# Patient Record
Sex: FEMALE | Race: WHITE | NOT HISPANIC OR LATINO | URBAN - METROPOLITAN AREA
[De-identification: names, ages, dates, MRNs, and addresses within clinical notes are randomized per-mention and may not be internally consistent; named-entity substitution may affect disease eponyms.]

---

## 2018-11-06 ENCOUNTER — INPATIENT (INPATIENT)
Facility: HOSPITAL | Age: 78
LOS: 2 days | Discharge: NURSING, REHAB OR LONG-TERM CARE | DRG: 916 | End: 2018-11-09
Attending: INTERNAL MEDICINE | Admitting: HOSPITALIST
Payer: COMMERCIAL

## 2018-11-06 VITALS
SYSTOLIC BLOOD PRESSURE: 150 MMHG | TEMPERATURE: 98 F | HEART RATE: 65 BPM | RESPIRATION RATE: 20 BRPM | OXYGEN SATURATION: 97 % | DIASTOLIC BLOOD PRESSURE: 86 MMHG

## 2018-11-06 DIAGNOSIS — T78.3XXA ANGIONEUROTIC EDEMA, INITIAL ENCOUNTER: ICD-10-CM

## 2018-11-06 LAB
ALBUMIN SERPL ELPH-MCNC: 3.8 G/DL — SIGNIFICANT CHANGE UP (ref 3.3–5.2)
ALP SERPL-CCNC: 120 U/L — SIGNIFICANT CHANGE UP (ref 40–120)
ALT FLD-CCNC: 23 U/L — SIGNIFICANT CHANGE UP
ANION GAP SERPL CALC-SCNC: 13 MMOL/L — SIGNIFICANT CHANGE UP (ref 5–17)
AST SERPL-CCNC: 25 U/L — SIGNIFICANT CHANGE UP
BASOPHILS # BLD AUTO: 0 K/UL — SIGNIFICANT CHANGE UP (ref 0–0.2)
BASOPHILS NFR BLD AUTO: 0.3 % — SIGNIFICANT CHANGE UP (ref 0–2)
BILIRUB SERPL-MCNC: 0.5 MG/DL — SIGNIFICANT CHANGE UP (ref 0.4–2)
BUN SERPL-MCNC: 14 MG/DL — SIGNIFICANT CHANGE UP (ref 8–20)
CALCIUM SERPL-MCNC: 9.5 MG/DL — SIGNIFICANT CHANGE UP (ref 8.6–10.2)
CHLORIDE SERPL-SCNC: 105 MMOL/L — SIGNIFICANT CHANGE UP (ref 98–107)
CO2 SERPL-SCNC: 23 MMOL/L — SIGNIFICANT CHANGE UP (ref 22–29)
CREAT SERPL-MCNC: 0.71 MG/DL — SIGNIFICANT CHANGE UP (ref 0.5–1.3)
EOSINOPHIL # BLD AUTO: 0.1 K/UL — SIGNIFICANT CHANGE UP (ref 0–0.5)
EOSINOPHIL NFR BLD AUTO: 1.8 % — SIGNIFICANT CHANGE UP (ref 0–6)
GLUCOSE SERPL-MCNC: 94 MG/DL — SIGNIFICANT CHANGE UP (ref 70–115)
HCT VFR BLD CALC: 45.9 % — SIGNIFICANT CHANGE UP (ref 37–47)
HGB BLD-MCNC: 15.3 G/DL — SIGNIFICANT CHANGE UP (ref 12–16)
LYMPHOCYTES # BLD AUTO: 2.3 K/UL — SIGNIFICANT CHANGE UP (ref 1–4.8)
LYMPHOCYTES # BLD AUTO: 28.9 % — SIGNIFICANT CHANGE UP (ref 20–55)
MCHC RBC-ENTMCNC: 31.9 PG — HIGH (ref 27–31)
MCHC RBC-ENTMCNC: 33.3 G/DL — SIGNIFICANT CHANGE UP (ref 32–36)
MCV RBC AUTO: 95.6 FL — SIGNIFICANT CHANGE UP (ref 81–99)
MONOCYTES # BLD AUTO: 0.7 K/UL — SIGNIFICANT CHANGE UP (ref 0–0.8)
MONOCYTES NFR BLD AUTO: 8.5 % — SIGNIFICANT CHANGE UP (ref 3–10)
NEUTROPHILS # BLD AUTO: 4.7 K/UL — SIGNIFICANT CHANGE UP (ref 1.8–8)
NEUTROPHILS NFR BLD AUTO: 60.1 % — SIGNIFICANT CHANGE UP (ref 37–73)
PLATELET # BLD AUTO: 268 K/UL — SIGNIFICANT CHANGE UP (ref 150–400)
POTASSIUM SERPL-MCNC: 4.4 MMOL/L — SIGNIFICANT CHANGE UP (ref 3.5–5.3)
POTASSIUM SERPL-SCNC: 4.4 MMOL/L — SIGNIFICANT CHANGE UP (ref 3.5–5.3)
PROT SERPL-MCNC: 7.6 G/DL — SIGNIFICANT CHANGE UP (ref 6.6–8.7)
RBC # BLD: 4.8 M/UL — SIGNIFICANT CHANGE UP (ref 4.4–5.2)
RBC # FLD: 13.4 % — SIGNIFICANT CHANGE UP (ref 11–15.6)
SODIUM SERPL-SCNC: 141 MMOL/L — SIGNIFICANT CHANGE UP (ref 135–145)
WBC # BLD: 7.9 K/UL — SIGNIFICANT CHANGE UP (ref 4.8–10.8)
WBC # FLD AUTO: 7.9 K/UL — SIGNIFICANT CHANGE UP (ref 4.8–10.8)

## 2018-11-06 PROCEDURE — 99285 EMERGENCY DEPT VISIT HI MDM: CPT

## 2018-11-06 PROCEDURE — 71045 X-RAY EXAM CHEST 1 VIEW: CPT | Mod: 26

## 2018-11-06 PROCEDURE — 99223 1ST HOSP IP/OBS HIGH 75: CPT

## 2018-11-06 RX ORDER — ENOXAPARIN SODIUM 100 MG/ML
40 INJECTION SUBCUTANEOUS DAILY
Qty: 0 | Refills: 0 | Status: DISCONTINUED | OUTPATIENT
Start: 2018-11-06 | End: 2018-11-09

## 2018-11-06 RX ORDER — ATENOLOL 25 MG/1
25 TABLET ORAL DAILY
Qty: 0 | Refills: 0 | Status: DISCONTINUED | OUTPATIENT
Start: 2018-11-06 | End: 2018-11-09

## 2018-11-06 RX ORDER — MEMANTINE HYDROCHLORIDE 10 MG/1
5 TABLET ORAL EVERY 12 HOURS
Qty: 0 | Refills: 0 | Status: DISCONTINUED | OUTPATIENT
Start: 2018-11-06 | End: 2018-11-09

## 2018-11-06 RX ORDER — FAMOTIDINE 10 MG/ML
20 INJECTION INTRAVENOUS ONCE
Qty: 0 | Refills: 0 | Status: COMPLETED | OUTPATIENT
Start: 2018-11-06 | End: 2018-11-06

## 2018-11-06 RX ORDER — DIPHENHYDRAMINE HCL 50 MG
50 CAPSULE ORAL ONCE
Qty: 0 | Refills: 0 | Status: COMPLETED | OUTPATIENT
Start: 2018-11-06 | End: 2018-11-06

## 2018-11-06 RX ADMIN — Medication 50 MILLIGRAM(S): at 19:44

## 2018-11-06 RX ADMIN — Medication 125 MILLIGRAM(S): at 19:44

## 2018-11-06 RX ADMIN — FAMOTIDINE 20 MILLIGRAM(S): 10 INJECTION INTRAVENOUS at 19:44

## 2018-11-06 NOTE — H&P ADULT - FAMILY HISTORY
Family history of stroke, Father     Father  Still living? Unknown  Family history of abdominal aortic aneurysm, Age at diagnosis: Age Unknown

## 2018-11-06 NOTE — H&P ADULT - ASSESSMENT
79 y/o female PMHx of  dementia, HTN was sent from [facility] to the ED due to upper lip swelling that started at 3pm.    Upper lip swelling due to angioedema in a patient using Losartan   Benadryl, Steroid and Famotidine given in the ED   Will continue these medications as needed   Avoid ARBs and ACEI   Monitor O2 sat     HTN  Continue Atenolol 12.5mg daily  Monitor BP    Dementia  Continue Namenda 5mg BID  Continue Rivastigmine 1.5mg BID     CVA  Stable     Supportive   DVT prophylaxis: Lovenox 40mg   GI prophylaxis: not indicated   Diet: Will keep NPO over night and reassess in AM 79 y/o female PMHx of  dementia, HTN was sent from [facility] to the ED due to upper lip swelling that started at 3pm.    Upper lip swelling due to angioedema in a patient using Losartan   Benadryl, Steroid and Famotidine given in the ED   Will continue these medications as needed   Avoid ARBs and ACEI   Monitor O2 sat     HTN  Patient is on Atenolol 12.5mg daily, will increase it to 25mg   Losartan 100mg discontinued   Monitor BP    Dementia  Continue Namenda 5mg BID  Continue Rivastigmine 1.5mg BID     CVA  Stable     Supportive   DVT prophylaxis: Lovenox 40mg   GI prophylaxis: not indicated   Diet: Will keep NPO over night and reassess in AM

## 2018-11-06 NOTE — ED PROVIDER NOTE - OBJECTIVE STATEMENT
77yo female PMH dementia, HTN presenting with upper lip swelling that started at 3pm. Patient lives at nursing home, received Benadryl 25mg PO x 1 at 8am this morning. This afternoon, staff noticed swelling on upper lip and patient sent to ED. Patient does take a angiotensin receptor blocker (Losartan). As per EMS patient had no new exposures today- no new foods or medications.

## 2018-11-06 NOTE — ED PROVIDER NOTE - ATTENDING CONTRIBUTION TO CARE
79yo female PMH dementia, HTN presenting with upper lip swelling that started at 3pm. Patient lives at nursing home, received Benadryl 25mg PO x 1 at 8am this morning. This afternoon, staff noticed swelling on upper lip and patient sent to ED. Patient does take a angiotensin receptor blocker (Losartan). As per EMS patient had no new exposures today- no new foods or medications.  Gen: NAD  Head: NCAT  HEENT: Upper lip edematous, no uvular edema, no tongue swelling, normal voice  Lung: CTAB, no respiratory distress, no wheezing, rales, rhonchi  CV: normal s1/s2, rrr, no murmurs, Normal perfusion  Abd: soft, NTND  MSK: No edema, no visible deformities, full range of motion in all 4 extremities  Neuro: No focal neurologic deficits  Skin: No rash    persistent swelling - on Losartan given age little clinical response to meds will admit for airway os pulse ox bed

## 2018-11-06 NOTE — ED PROVIDER NOTE - MEDICAL DECISION MAKING DETAILS
79yo female with swelling to upper lip, likely angioedema, seems to be limited to upper lip without airway compromise. Likely reaction to Losartan. Will treat with prednisone, benadryl, pepcid, likely will need admission for airway monitoring. Brenda Branch DO

## 2018-11-06 NOTE — ED ADULT NURSE NOTE - INTERVENTIONS DEFINITIONS
Call bell, personal items and telephone within reach/[place] to call system/Instruct patient to call for assistance

## 2018-11-06 NOTE — ED PROVIDER NOTE - PHYSICAL EXAMINATION
Gen: NAD  Head: NCAT  HEENT: Upper lip edematous, no uvular edema, no tongue swelling, normal voice  Lung: CTAB, no respiratory distress, no wheezing, rales, rhonchi  CV: normal s1/s2, rrr, no murmurs, Normal perfusion  Abd: soft, NTND  MSK: No edema, no visible deformities, full range of motion in all 4 extremities  Neuro: No focal neurologic deficits  Skin: No rash

## 2018-11-06 NOTE — ED ADULT NURSE NOTE - OBJECTIVE STATEMENT
Pt awake, alert and oriented to person, place, disoriented to time, baseline mental status.  Pt send to ED from [facility] for upper lip swelling.  Pt denies difficulty breathing.  Respirations even and unlabored.  Denies pain.  No rash noted.  Pt denies any new food choices or exposures.  Lung sounds clear to auscultation.

## 2018-11-06 NOTE — ED ADULT TRIAGE NOTE - CHIEF COMPLAINT QUOTE
Pt THOMAS from [facility] in [place] for allergic reaction, swelling of lips, upper and lower airway cleared by MD Branch, Benadryl given at 0845 am. Pt at baseline mental status, hx of dementia

## 2018-11-06 NOTE — ED ADULT NURSE NOTE - CHIEF COMPLAINT QUOTE
Pt THOMAS from [facility] in [place] for allergic reaction, swelling of lips, upper and lower airway cleared by MD Branch, Benadryl given at 0845 am. Pt at baseline mental status, hx of dementia

## 2018-11-06 NOTE — H&P ADULT - HISTORY OF PRESENT ILLNESS
79 y/o female PMHx of  dementia, HTN was sent from [facility] to the ED due to upper lip swelling that started at 3pm. Patient received Benadryl 25mg PO x 1 at 8am this morning. This afternoon, staff noticed swelling on upper lip and patient sent to ED. Patient does take a angiotensin receptor blocker (Losartan). As per EMS patient had no new exposures today- no new foods or medications. Patient has no difficulty breathing, no chest pain, cough, HA, fever, no change in diet. As per patient's  at bed side, voice is normal.

## 2018-11-07 DIAGNOSIS — G30.9 ALZHEIMER'S DISEASE, UNSPECIFIED: ICD-10-CM

## 2018-11-07 DIAGNOSIS — I10 ESSENTIAL (PRIMARY) HYPERTENSION: ICD-10-CM

## 2018-11-07 DIAGNOSIS — I63.9 CEREBRAL INFARCTION, UNSPECIFIED: ICD-10-CM

## 2018-11-07 DIAGNOSIS — T78.3XXS ANGIONEUROTIC EDEMA, SEQUELA: ICD-10-CM

## 2018-11-07 PROCEDURE — 99233 SBSQ HOSP IP/OBS HIGH 50: CPT

## 2018-11-07 RX ORDER — ATORVASTATIN CALCIUM 80 MG/1
1 TABLET, FILM COATED ORAL
Qty: 0 | Refills: 0 | DISCHARGE
Start: 2018-11-07

## 2018-11-07 RX ORDER — ATORVASTATIN CALCIUM 80 MG/1
10 TABLET, FILM COATED ORAL AT BEDTIME
Qty: 0 | Refills: 0 | Status: DISCONTINUED | OUTPATIENT
Start: 2018-11-07 | End: 2018-11-09

## 2018-11-07 RX ORDER — LOSARTAN POTASSIUM 100 MG/1
1 TABLET, FILM COATED ORAL
Qty: 0 | Refills: 0 | COMMUNITY

## 2018-11-07 RX ORDER — ASPIRIN/CALCIUM CARB/MAGNESIUM 324 MG
81 TABLET ORAL DAILY
Qty: 0 | Refills: 0 | Status: DISCONTINUED | OUTPATIENT
Start: 2018-11-07 | End: 2018-11-09

## 2018-11-07 RX ORDER — ASPIRIN/CALCIUM CARB/MAGNESIUM 324 MG
1 TABLET ORAL
Qty: 0 | Refills: 0 | DISCHARGE
Start: 2018-11-07

## 2018-11-07 RX ADMIN — MEMANTINE HYDROCHLORIDE 5 MILLIGRAM(S): 10 TABLET ORAL at 19:03

## 2018-11-07 RX ADMIN — ENOXAPARIN SODIUM 40 MILLIGRAM(S): 100 INJECTION SUBCUTANEOUS at 19:03

## 2018-11-07 RX ADMIN — ATORVASTATIN CALCIUM 10 MILLIGRAM(S): 80 TABLET, FILM COATED ORAL at 21:23

## 2018-11-07 NOTE — SWALLOW BEDSIDE ASSESSMENT ADULT - SWALLOW EVAL: DIAGNOSIS
Oral & pharyngeal stage of swallow clinically unremarkable with no overt s/s of penetration/aspiration

## 2018-11-07 NOTE — DISCHARGE NOTE ADULT - HOSPITAL COURSE
78 year old female dementia, minimally swollen lip on arb.      Problem/Plan - 1:  ·  Problem: Angioedema, sequela.  Plan: Patient has minimally swollen lip on losartan. Although unlikely I would discontinue the losartan in lieu of other antihypertensive agents that are not prone towards angioedema.      Problem/Plan - 2:  ·  Problem: Cerebrovascular accident (CVA), unspecified mechanism.  Plan: Supportive care.      Problem/Plan - 3:  ·  Problem: Alzheimer's dementia with behavioral disturbance, unspecified timing of dementia onset.  Plan: Supportive care.      Problem/Plan - 4:  ·  Problem: Essential hypertension.  Plan: Monitor BP consider alternative agent if BP is elevated.     Attending Attestation:   I was physically present for the key portions of the evaluation and management (E/M) service provided.  I agree with the above history, physical, and plan which I have reviewed and edited where appropriate.     46 minutes spent on total encounter; more than 50% of the visit was spent counseling and/or coordinating care by the attending physician. 78 year old female dementia, minimally swollen lip on arb.      Problem/Plan - 1:  ·  Problem: Angioedema, sequela.  Plan: Patient has minimally swollen lip on losartan. Although unlikely I would discontinue the losartan in lieu of other antihypertensive agents that are not prone towards angioedema.      Problem/Plan - 2:  ·  Problem: Cerebrovascular accident (CVA), unspecified mechanism.  Plan: Supportive care.      Problem/Plan - 3:  ·  Problem: Alzheimer's dementia with behavioral disturbance, unspecified timing of dementia onset.  Plan: Supportive care.      Problem/Plan - 4:  ·  Problem: Essential hypertension.  Plan: Monitor BP consider alternative agent if BP is elevated.   Add amlodipine 5mg daily titrate up as necessary.    Attending Attestation:   I was physically present for the key portions of the evaluation and management (E/M) service provided.  I agree with the above history, physical, and plan which I have reviewed and edited where appropriate.     46 minutes spent on total encounter; more than 50% of the visit was spent counseling and/or coordinating care by the attending physician.

## 2018-11-07 NOTE — SWALLOW BEDSIDE ASSESSMENT ADULT - ASR SWALLOW ASPIRATION MONITOR
fever/pneumonia/throat clearing/upper respiratory infection/gurgly voice/change of breathing pattern/position upright (90Y)/cough/oral hygiene

## 2018-11-07 NOTE — SWALLOW BEDSIDE ASSESSMENT ADULT - COMMENTS
79 y/o female PMHx of  dementia, HTN was sent from [facility] to the ED due to upper lip swelling

## 2018-11-07 NOTE — DISCHARGE NOTE ADULT - CARE PLAN
Principal Discharge DX:	Angioedema, sequela  Goal:	Prevent Angioedema  Assessment and plan of treatment:	Discontinue losartan, Add additional anti hypertensive agent id necessary.  I doubt that this is true angioedema but patient does require close monitoring at this time.  Secondary Diagnosis:	Alzheimer's dementia with behavioral disturbance, unspecified timing of dementia onset  Goal:	Supportive care  Secondary Diagnosis:	Cerebrovascular accident (CVA), unspecified mechanism  Goal:	Prevent recurrent stroke  Assessment and plan of treatment:	Add aspirin and statin  Secondary Diagnosis:	Essential hypertension  Goal:	Monitor BP closely  Assessment and plan of treatment:	Now that losartan is discontinued she will probably need an alternative agent such as calcium channel blocker if necessary. Principal Discharge DX:	Angioedema, sequela  Goal:	Prevent Angioedema  Assessment and plan of treatment:	Discontinue losartan, Add additional anti hypertensive agent id necessary.  I doubt that this is true angioedema but patient does require close monitoring at this time.  Secondary Diagnosis:	Alzheimer's dementia with behavioral disturbance, unspecified timing of dementia onset  Goal:	Supportive care  Secondary Diagnosis:	Cerebrovascular accident (CVA), unspecified mechanism  Goal:	Prevent recurrent stroke  Assessment and plan of treatment:	Add aspirin and statin  Secondary Diagnosis:	Essential hypertension  Goal:	Monitor BP closely  Assessment and plan of treatment:	Now that losartan is discontinued she will probably need an alternative agent such as calcium channel blocker if necessary. Add amlodipine 5 mg daily

## 2018-11-07 NOTE — PROGRESS NOTE ADULT - SUBJECTIVE AND OBJECTIVE BOX
FLORIDALMA SKAGGS     Chief Complaint: Patient is a 78y old  Female who presents with a chief complaint of Angioedema (06 Nov 2018 21:09)      PAST MEDICAL & SURGICAL HISTORY:  CVA (cerebral vascular accident)  Dementia  Hypertension  S/P colonoscopy with polypectomy  S/P inguinal hernia repair: Bilateral  age 17      HPI/OVERNIGHT EVENTS: Patient lying in bed in no distress    MEDICATIONS  (STANDING):  ATENolol  Tablet 25 milliGRAM(s) Oral daily  enoxaparin Injectable 40 milliGRAM(s) SubCutaneous daily  memantine 5 milliGRAM(s) Oral every 12 hours      Vital Signs Last 24 Hrs  T(C): 36.4 (07 Nov 2018 11:03), Max: 37 (07 Nov 2018 04:04)  T(F): 97.5 (07 Nov 2018 11:03), Max: 98.6 (07 Nov 2018 04:04)  HR: 71 (07 Nov 2018 11:03) (65 - 71)  BP: 149/87 (07 Nov 2018 11:03) (128/78 - 150/86)  BP(mean): --  RR: 18 (07 Nov 2018 11:03) (16 - 20)  SpO2: 95% (07 Nov 2018 11:03) (94% - 97%)    PHYSICAL EXAM:  HEENT:  minimally swollen lips  Neck: No LAD, No JVD  Back: No CVA tenderness  Respiratory: CTAB Cardiovascular: S1 and S2, RRR, no M/G/R  Gastrointestinal: BS+, soft, NT/ND  Extremities: No peripheral edema  Vascular: 2+ peripheral pulses  Neurological: A/O x 0      CAPILLARY BLOOD GLUCOSE    LABS:                        15.3   7.9   )-----------( 268      ( 06 Nov 2018 19:35 )             45.9     11-06    141  |  105  |  14.0  ----------------------------<  94  4.4   |  23.0  |  0.71    Ca    9.5      06 Nov 2018 19:35    TPro  7.6  /  Alb  3.8  /  TBili  0.5  /  DBili  x   /  AST  25  /  ALT  23  /  AlkPhos  120  11-06          RADIOLOGY & ADDITIONAL TESTS:

## 2018-11-07 NOTE — DISCHARGE NOTE ADULT - MEDICATION SUMMARY - MEDICATIONS TO TAKE
I will START or STAY ON the medications listed below when I get home from the hospital:    aspirin 81 mg oral delayed release tablet  -- 1 tab(s) by mouth once a day  -- Indication: For Stroke    atorvastatin 10 mg oral tablet  -- 1 tab(s) by mouth once a day (at bedtime)  -- Indication: For Hyperlipidemia    atenolol  -- 12.5 milligram(s) by mouth once a day  -- Indication: For HTN    rivastigmine 1.5 mg oral capsule  -- 1 cap(s) by mouth 2 times a day  -- Indication: For Dementia    Namenda 5 mg oral tablet  -- 1 tab(s) by mouth 2 times a day  -- Indication: For Dementia    Vitamin D3 50,000 intl units oral capsule  -- orally once a week  -- Indication: For Vitamin D Deficiency I will START or STAY ON the medications listed below when I get home from the hospital:    aspirin 81 mg oral delayed release tablet  -- 1 tab(s) by mouth once a day  -- Indication: For Stroke    atorvastatin 10 mg oral tablet  -- 1 tab(s) by mouth once a day (at bedtime)  -- Indication: For Hyperlipidemia    atenolol  -- 12.5 milligram(s) by mouth once a day  -- Indication: For HTN    amLODIPine 2.5 mg oral tablet  -- 1 tab(s) by mouth once a day  -- Indication: For Hypertension    rivastigmine 1.5 mg oral capsule  -- 1 cap(s) by mouth 2 times a day  -- Indication: For Dementia    Namenda 5 mg oral tablet  -- 1 tab(s) by mouth 2 times a day  -- Indication: For Dementia    Vitamin D3 50,000 intl units oral capsule  -- orally once a week  -- Indication: For Vitamin D Deficiency

## 2018-11-07 NOTE — DISCHARGE NOTE ADULT - PATIENT PORTAL LINK FT
You can access the [facility] Patient Portal, offered by [facility], by registering with the following website: [email or web address]

## 2018-11-07 NOTE — PROGRESS NOTE ADULT - PROBLEM SELECTOR PLAN 1
Patient has minimally swollen lip on losartan. Although unlikely I would discontinue the losartan in lieu of other antihypertensive agents that are not prone towards angioedema

## 2018-11-07 NOTE — DISCHARGE NOTE ADULT - SECONDARY DIAGNOSIS.
Alzheimer's dementia with behavioral disturbance, unspecified timing of dementia onset Cerebrovascular accident (CVA), unspecified mechanism Essential hypertension

## 2018-11-07 NOTE — DISCHARGE NOTE ADULT - PLAN OF CARE
Prevent Angioedema Discontinue losartan, Add additional anti hypertensive agent id necessary.  I doubt that this is true angioedema but patient does require close monitoring at this time. Supportive care Prevent recurrent stroke Add aspirin and statin Monitor BP closely Now that losartan is discontinued she will probably need an alternative agent such as calcium channel blocker if necessary. Now that losartan is discontinued she will probably need an alternative agent such as calcium channel blocker if necessary. Add amlodipine 5 mg daily

## 2018-11-08 PROCEDURE — 99232 SBSQ HOSP IP/OBS MODERATE 35: CPT

## 2018-11-08 RX ORDER — AMLODIPINE BESYLATE 2.5 MG/1
1 TABLET ORAL
Qty: 30 | Refills: 0 | OUTPATIENT
Start: 2018-11-08

## 2018-11-08 RX ORDER — AMLODIPINE BESYLATE 2.5 MG/1
2.5 TABLET ORAL DAILY
Qty: 0 | Refills: 0 | Status: DISCONTINUED | OUTPATIENT
Start: 2018-11-08 | End: 2018-11-09

## 2018-11-08 RX ADMIN — AMLODIPINE BESYLATE 2.5 MILLIGRAM(S): 2.5 TABLET ORAL at 12:21

## 2018-11-08 RX ADMIN — Medication 81 MILLIGRAM(S): at 11:15

## 2018-11-08 RX ADMIN — ATORVASTATIN CALCIUM 10 MILLIGRAM(S): 80 TABLET, FILM COATED ORAL at 23:00

## 2018-11-08 RX ADMIN — MEMANTINE HYDROCHLORIDE 5 MILLIGRAM(S): 10 TABLET ORAL at 17:04

## 2018-11-08 RX ADMIN — ENOXAPARIN SODIUM 40 MILLIGRAM(S): 100 INJECTION SUBCUTANEOUS at 11:15

## 2018-11-08 RX ADMIN — MEMANTINE HYDROCHLORIDE 5 MILLIGRAM(S): 10 TABLET ORAL at 05:38

## 2018-11-08 RX ADMIN — ATENOLOL 25 MILLIGRAM(S): 25 TABLET ORAL at 05:38

## 2018-11-08 NOTE — PHYSICAL THERAPY INITIAL EVALUATION ADULT - PLANNED THERAPY INTERVENTIONS, PT EVAL
balance training/bed mobility training/gait training/postural re-education/strengthening/ROM/transfer training

## 2018-11-08 NOTE — PHYSICAL THERAPY INITIAL EVALUATION ADULT - ADDITIONAL COMMENTS
Pt is a poor historian, unknown PLOF, Per H&P pt resides at the [facility]. VISHAL Browne states pt had "some help" but unsure of level of assist needed.

## 2018-11-08 NOTE — CHART NOTE - NSCHARTNOTEFT_GEN_A_CORE
SWNote: [facility],awaiting call with review outcome. SW to follow.

## 2018-11-08 NOTE — PROGRESS NOTE ADULT - SUBJECTIVE AND OBJECTIVE BOX
FLORIDALMA SKAGGS     Chief Complaint: Patient is a 78y old  Female who presents with a chief complaint of Angioedema (07 Nov 2018 13:50)      PAST MEDICAL & SURGICAL HISTORY:  CVA (cerebral vascular accident)  Dementia  Hypertension  S/P colonoscopy with polypectomy  S/P inguinal hernia repair: Bilateral  age 17      HPI/OVERNIGHT EVENTS: Patient is awake and stable, He rBP is elevated, add amlodipine.    MEDICATIONS  (STANDING):  amLODIPine   Tablet 2.5 milliGRAM(s) Oral daily  aspirin enteric coated 81 milliGRAM(s) Oral daily  ATENolol  Tablet 25 milliGRAM(s) Oral daily  atorvastatin 10 milliGRAM(s) Oral at bedtime  enoxaparin Injectable 40 milliGRAM(s) SubCutaneous daily  memantine 5 milliGRAM(s) Oral every 12 hours      Vital Signs Last 24 Hrs  T(C): 36.7 (08 Nov 2018 07:15), Max: 36.8 (07 Nov 2018 16:10)  T(F): 98 (08 Nov 2018 07:15), Max: 98.3 (08 Nov 2018 05:37)  HR: 59 (08 Nov 2018 07:15) (59 - 86)  BP: 139/91 (08 Nov 2018 07:15) (139/91 - 163/78)  BP(mean): --  RR: 18 (08 Nov 2018 07:15) (17 - 18)  SpO2: 97% (08 Nov 2018 07:15) (94% - 97%)    PHYSICAL EXAM:  HEENT: PERRLA, EOMI, Normal Hearing  Neck: No LAD, No JVD  Back: No CVA tenderness  Respiratory: CTAB Cardiovascular: S1 and S2, RRR, no M/G/R  Gastrointestinal: BS+, soft, NT/ND  Extremities: No peripheral edema  Vascular: 2+ peripheral pulses  Neurological: A/O x 0 but she is verbal and answers simple questions.        CAPILLARY BLOOD GLUCOSE    LABS:                        15.3   7.9   )-----------( 268      ( 06 Nov 2018 19:35 )             45.9     11-06    141  |  105  |  14.0  ----------------------------<  94  4.4   |  23.0  |  0.71    Ca    9.5      06 Nov 2018 19:35    TPro  7.6  /  Alb  3.8  /  TBili  0.5  /  DBili  x   /  AST  25  /  ALT  23  /  AlkPhos  120  11-06          RADIOLOGY & ADDITIONAL TESTS:

## 2018-11-08 NOTE — PHYSICAL THERAPY INITIAL EVALUATION ADULT - PERTINENT HX OF CURRENT PROBLEM, REHAB EVAL
78 year old female with h/o Alzheimer's dementia, CVA,  Chief complaint of minimally swollen lip. Admitted for angioedema.

## 2018-11-08 NOTE — PROGRESS NOTE ADULT - ASSESSMENT
78 year old female dementia, minimally swollen lip on arb. Her losartan was discontinued secondary to fear of angioedema. Add amlodipine for elevated BP. She is stable to return to rehab.

## 2018-11-08 NOTE — PHYSICAL THERAPY INITIAL EVALUATION ADULT - TRANSFER SAFETY CONCERNS NOTED: SIT/STAND, REHAB EVAL
decreased balance during turns/decreased safety awareness/losing balance/decreased sequencing ability/decreased weight-shifting ability

## 2018-11-08 NOTE — PHYSICAL THERAPY INITIAL EVALUATION ADULT - IMPAIRED TRANSFERS: SIT/STAND, REHAB EVAL
decreased ROM/impaired balance/cognition/impaired coordination/narrow base of support/impaired motor control/abnormal muscle tone/impaired postural control/decreased strength

## 2018-11-08 NOTE — PHYSICAL THERAPY INITIAL EVALUATION ADULT - BALANCE DISTURBANCE, IDENTIFIED IMPAIRMENT CONTRIBUTE, REHAB EVAL
impaired coordination/impaired motor control/impaired postural control/abnormal muscle tone/decreased strength

## 2018-11-08 NOTE — PHYSICAL THERAPY INITIAL EVALUATION ADULT - IMPAIRMENTS FOUND, PT EVAL
muscle strength/cognitive impairment/gait, locomotion, and balance/arousal, attention, and cognition

## 2018-11-08 NOTE — PROGRESS NOTE ADULT - PROBLEM SELECTOR PLAN 1
Patient has minimally swollen lip on losartan. Although unlikely I would discontinue the losartan in lieu of other antihypertensive agents that are not prone towards angioedema. Add amlodipine 2.5mg daily.

## 2018-11-09 VITALS — DIASTOLIC BLOOD PRESSURE: 90 MMHG | TEMPERATURE: 97 F | SYSTOLIC BLOOD PRESSURE: 170 MMHG | HEART RATE: 66 BPM

## 2018-11-09 PROCEDURE — G0378: CPT

## 2018-11-09 PROCEDURE — 97110 THERAPEUTIC EXERCISES: CPT

## 2018-11-09 PROCEDURE — 96375 TX/PRO/DX INJ NEW DRUG ADDON: CPT

## 2018-11-09 PROCEDURE — 36415 COLL VENOUS BLD VENIPUNCTURE: CPT

## 2018-11-09 PROCEDURE — 85027 COMPLETE CBC AUTOMATED: CPT

## 2018-11-09 PROCEDURE — 80053 COMPREHEN METABOLIC PANEL: CPT

## 2018-11-09 PROCEDURE — 92610 EVALUATE SWALLOWING FUNCTION: CPT

## 2018-11-09 PROCEDURE — 96374 THER/PROPH/DIAG INJ IV PUSH: CPT

## 2018-11-09 PROCEDURE — 99285 EMERGENCY DEPT VISIT HI MDM: CPT | Mod: 25

## 2018-11-09 PROCEDURE — 97163 PT EVAL HIGH COMPLEX 45 MIN: CPT

## 2018-11-09 PROCEDURE — 97530 THERAPEUTIC ACTIVITIES: CPT

## 2018-11-09 PROCEDURE — 99239 HOSP IP/OBS DSCHRG MGMT >30: CPT

## 2018-11-09 PROCEDURE — 71045 X-RAY EXAM CHEST 1 VIEW: CPT

## 2018-11-09 RX ORDER — AMLODIPINE BESYLATE 2.5 MG/1
5 TABLET ORAL ONCE
Qty: 0 | Refills: 0 | Status: DISCONTINUED | OUTPATIENT
Start: 2018-11-09 | End: 2018-11-09

## 2018-11-09 RX ADMIN — ATENOLOL 25 MILLIGRAM(S): 25 TABLET ORAL at 06:18

## 2018-11-09 RX ADMIN — ENOXAPARIN SODIUM 40 MILLIGRAM(S): 100 INJECTION SUBCUTANEOUS at 12:08

## 2018-11-09 RX ADMIN — AMLODIPINE BESYLATE 2.5 MILLIGRAM(S): 2.5 TABLET ORAL at 06:18

## 2018-11-09 RX ADMIN — MEMANTINE HYDROCHLORIDE 5 MILLIGRAM(S): 10 TABLET ORAL at 06:18

## 2018-11-09 RX ADMIN — Medication 81 MILLIGRAM(S): at 12:08

## 2018-11-09 NOTE — PROGRESS NOTE ADULT - PROBLEM SELECTOR PLAN 1
Patient has minimally swollen lip on losartan. Although unlikely I would discontinue the losartan in lieu of other antihypertensive agents that are not prone towards angioedema. Add amlodipine 2.5mg daily. Patient has minimally swollen lip on losartan. Although unlikely I would discontinue the losartan in lieu of other antihypertensive agents that are not prone towards angioedema. Add amlodipine 2.5mg daily. 11/9 increase amlodipine to 5 mg daily secondary to elevated bp.

## 2018-11-09 NOTE — PROGRESS NOTE ADULT - SUBJECTIVE AND OBJECTIVE BOX
FLORIDALMA SKAGGS     Chief Complaint: Patient is a 78y old  Female who presents with a chief complaint of Angioedema (08 Nov 2018 09:42)      PAST MEDICAL & SURGICAL HISTORY:  CVA (cerebral vascular accident)  Dementia  Hypertension  S/P colonoscopy with polypectomy  S/P inguinal hernia repair: Bilateral  age 17      HPI/OVERNIGHT EVENTS: Patient lying in bed in no distress. She is more alert asking to talk to her .    MEDICATIONS  (STANDING):  amLODIPine   Tablet 2.5 milliGRAM(s) Oral daily  aspirin enteric coated 81 milliGRAM(s) Oral daily  ATENolol  Tablet 25 milliGRAM(s) Oral daily  atorvastatin 10 milliGRAM(s) Oral at bedtime  enoxaparin Injectable 40 milliGRAM(s) SubCutaneous daily  memantine 5 milliGRAM(s) Oral every 12 hours      Vital Signs Last 24 Hrs  T(C): 36.4 (09 Nov 2018 11:36), Max: 36.7 (08 Nov 2018 23:40)  T(F): 97.6 (09 Nov 2018 11:36), Max: 98 (08 Nov 2018 23:40)  HR: 62 (09 Nov 2018 11:36) (54 - 70)  BP: 156/85 (09 Nov 2018 11:36) (143/87 - 157/93)  BP(mean): --  RR: 18 (09 Nov 2018 11:36) (17 - 18)  SpO2: 95% (09 Nov 2018 11:36) (93% - 95%)    PHYSICAL EXAM:  HEENT: PERRLA, EOMI, Normal Hearing  Neck: No LAD, No JVD  Back: No CVA tenderness  Respiratory: CTAB Cardiovascular: S1 and S2, RRR, no M/G/R  Gastrointestinal: BS+, soft, NT/ND  Extremities: No peripheral edema  Vascular: 2+ peripheral pulses  Neurological: A/O x  2        CAPILLARY BLOOD GLUCOSE    LABS:                RADIOLOGY & ADDITIONAL TESTS:

## 2019-05-07 ENCOUNTER — EMERGENCY (EMERGENCY)
Facility: HOSPITAL | Age: 79
LOS: 1 days | End: 2019-05-07
Attending: EMERGENCY MEDICINE
Payer: COMMERCIAL

## 2019-05-07 VITALS
SYSTOLIC BLOOD PRESSURE: 174 MMHG | HEIGHT: 66 IN | OXYGEN SATURATION: 98 % | RESPIRATION RATE: 16 BRPM | TEMPERATURE: 98 F | DIASTOLIC BLOOD PRESSURE: 96 MMHG | WEIGHT: 169.98 LBS | HEART RATE: 60 BPM

## 2019-05-07 VITALS
DIASTOLIC BLOOD PRESSURE: 83 MMHG | TEMPERATURE: 98 F | HEART RATE: 57 BPM | OXYGEN SATURATION: 97 % | RESPIRATION RATE: 16 BRPM | SYSTOLIC BLOOD PRESSURE: 128 MMHG

## 2019-05-07 PROCEDURE — 72170 X-RAY EXAM OF PELVIS: CPT | Mod: 26

## 2019-05-07 PROCEDURE — 71045 X-RAY EXAM CHEST 1 VIEW: CPT | Mod: 26

## 2019-05-07 PROCEDURE — 70450 CT HEAD/BRAIN W/O DYE: CPT | Mod: 26

## 2019-05-07 PROCEDURE — 70450 CT HEAD/BRAIN W/O DYE: CPT

## 2019-05-07 PROCEDURE — 71045 X-RAY EXAM CHEST 1 VIEW: CPT

## 2019-05-07 PROCEDURE — 99284 EMERGENCY DEPT VISIT MOD MDM: CPT | Mod: 25

## 2019-05-07 PROCEDURE — 72170 X-RAY EXAM OF PELVIS: CPT

## 2019-05-07 NOTE — ED PROVIDER NOTE - CLINICAL SUMMARY MEDICAL DECISION MAKING FREE TEXT BOX
pt with likely mechanical fall going to bathroom without help, ct head, chest/pelvis xray as part of fall with poor historian work up. cpk. reassess pt with likely mechanical fall going to bathroom without help, ct head, chest/pelvis xray as part of fall with poor historian work up. reassess

## 2019-05-07 NOTE — ED ADULT NURSE NOTE - CHIEF COMPLAINT QUOTE
unwitnessed fall into bathtub at [facility]. Hx of dementia, at baseline as per EMS. unknown time of fall. small hematoma noted to back of head, negative blood thinners. no other significant trauma noted. Priority CT called from ambulance

## 2019-05-07 NOTE — ED PROVIDER NOTE - OBJECTIVE STATEMENT
78 y/o female hx dementia, htn, cva, present from assisted living s/p fall into bathtub. Fall was not witnessed per ems, pt found awake in bathtub. Pt poor historian but is at baseline per home via ems. Pt without complaints, unclear exactly how long pt down but facilty checks on pts every few hours. Pt dnr.     limited ros by dementia 78 y/o female hx dementia, htn, cva, present from assisted living s/p fall into bathtub. Fall was not witnessed per ems, pt found awake in bathtub. Pt poor historian but is at baseline per home via ems. Pt without complaints, unclear exactly how long pt down but facilty checks on pts every few hours. Pt dnr. no a/c.     limited ros by dementia

## 2019-05-07 NOTE — ED ADULT TRIAGE NOTE - CHIEF COMPLAINT QUOTE
unwitnessed fall into bathtub at [facility]. Hx of dementia, at baseline as per EMS. unknown time of fall. small hematoma noted to back of head, negative blood thinners. no other significant trauma noted. Priority CT called from ambulance

## 2019-05-07 NOTE — ED ADULT NURSE NOTE - OBJECTIVE STATEMENT
Assumed care of patient at 0230, alert awake oriented to person. At baseline as per EMS. Hx of dementia. s/p Unwitnessed fall at [facility]. Small hematoma noted to back of head. No other injuries noted. Pt denies pain. scans completed and resulted. Pt to be discharged back to nursing home. Awaiting ambulance.

## 2019-05-07 NOTE — ED PROVIDER NOTE - PHYSICAL EXAMINATION
Gen: No acute distress, non toxic  HEENT: Mucous membranes moist, pink conjunctivae, EOMI. small hematoma occiput, no lac/abrasion  Neck: no midline ttp.   CV: RRR, nl s1/s2.  Resp: CTAB, normal rate and effort  GI: Abdomen soft, NT, ND. No rebound, no guarding  : No CVAT  Neuro: A&O x 1, moves all extremites.   MSK: No spine or joint tenderness to palpation. full rom b/l LE without shortening or rotation. no deformity or bruising.  Skin: No rashes. intact and perfused.

## 2019-05-07 NOTE — ED PROVIDER NOTE - PROGRESS NOTE DETAILS
Ana: neg ct and xray without acute fracuture, no other injury spoke to Philly from [place], aware pt cleared for return.

## 2019-05-07 NOTE — ED ADULT NURSE NOTE - NSIMPLEMENTINTERV_GEN_ALL_ED
Implemented All Fall Risk Interventions:  [place] to call system. Call bell, personal items and telephone within reach. Instruct patient to call for assistance. Room bathroom lighting operational. Non-slip footwear when patient is off stretcher. Physically safe environment: no spills, clutter or unnecessary equipment. Stretcher in lowest position, wheels locked, appropriate side rails in place. Provide visual cue, wrist band, yellow gown, etc. Monitor gait and stability. Monitor for mental status changes and reorient to person, place, and time. Review medications for side effects contributing to fall risk. Reinforce activity limits and safety measures with patient and family.

## 2019-05-13 ENCOUNTER — EMERGENCY (EMERGENCY)
Facility: HOSPITAL | Age: 79
LOS: 1 days | Discharge: HOME | End: 2019-05-13
Attending: EMERGENCY MEDICINE
Payer: COMMERCIAL

## 2019-05-13 VITALS
SYSTOLIC BLOOD PRESSURE: 175 MMHG | DIASTOLIC BLOOD PRESSURE: 101 MMHG | TEMPERATURE: 98 F | RESPIRATION RATE: 18 BRPM | HEIGHT: 62 IN | OXYGEN SATURATION: 98 % | WEIGHT: 169.98 LBS | HEART RATE: 99 BPM

## 2019-05-13 VITALS
DIASTOLIC BLOOD PRESSURE: 96 MMHG | HEART RATE: 85 BPM | TEMPERATURE: 99 F | OXYGEN SATURATION: 97 % | SYSTOLIC BLOOD PRESSURE: 183 MMHG | RESPIRATION RATE: 18 BRPM

## 2019-05-13 LAB
ALBUMIN SERPL ELPH-MCNC: 4.3 G/DL — SIGNIFICANT CHANGE UP (ref 3.3–5.2)
ALP SERPL-CCNC: 177 U/L — HIGH (ref 40–120)
ALT FLD-CCNC: 30 U/L — SIGNIFICANT CHANGE UP
ANION GAP SERPL CALC-SCNC: 18 MMOL/L — HIGH (ref 5–17)
APPEARANCE UR: CLEAR — SIGNIFICANT CHANGE UP
AST SERPL-CCNC: 35 U/L — HIGH
BACTERIA # UR AUTO: ABNORMAL
BASOPHILS # BLD AUTO: 0 K/UL — SIGNIFICANT CHANGE UP (ref 0–0.2)
BASOPHILS NFR BLD AUTO: 0.3 % — SIGNIFICANT CHANGE UP (ref 0–2)
BILIRUB SERPL-MCNC: 0.5 MG/DL — SIGNIFICANT CHANGE UP (ref 0.4–2)
BILIRUB UR-MCNC: NEGATIVE — SIGNIFICANT CHANGE UP
BUN SERPL-MCNC: 16 MG/DL — SIGNIFICANT CHANGE UP (ref 8–20)
CALCIUM SERPL-MCNC: 9.9 MG/DL — SIGNIFICANT CHANGE UP (ref 8.6–10.2)
CHLORIDE SERPL-SCNC: 103 MMOL/L — SIGNIFICANT CHANGE UP (ref 98–107)
CO2 SERPL-SCNC: 23 MMOL/L — SIGNIFICANT CHANGE UP (ref 22–29)
COLOR SPEC: YELLOW — SIGNIFICANT CHANGE UP
CREAT SERPL-MCNC: 0.67 MG/DL — SIGNIFICANT CHANGE UP (ref 0.5–1.3)
DIFF PNL FLD: ABNORMAL
EOSINOPHIL # BLD AUTO: 0.1 K/UL — SIGNIFICANT CHANGE UP (ref 0–0.5)
EOSINOPHIL NFR BLD AUTO: 0.6 % — SIGNIFICANT CHANGE UP (ref 0–6)
EPI CELLS # UR: SIGNIFICANT CHANGE UP
GLUCOSE SERPL-MCNC: 118 MG/DL — HIGH (ref 70–115)
GLUCOSE UR QL: NEGATIVE MG/DL — SIGNIFICANT CHANGE UP
HCT VFR BLD CALC: 48.4 % — HIGH (ref 37–47)
HGB BLD-MCNC: 15.9 G/DL — SIGNIFICANT CHANGE UP (ref 12–16)
KETONES UR-MCNC: NEGATIVE — SIGNIFICANT CHANGE UP
LEUKOCYTE ESTERASE UR-ACNC: NEGATIVE — SIGNIFICANT CHANGE UP
LYMPHOCYTES # BLD AUTO: 2.8 K/UL — SIGNIFICANT CHANGE UP (ref 1–4.8)
LYMPHOCYTES # BLD AUTO: 22 % — SIGNIFICANT CHANGE UP (ref 20–55)
MAGNESIUM SERPL-MCNC: 2 MG/DL — SIGNIFICANT CHANGE UP (ref 1.6–2.6)
MCHC RBC-ENTMCNC: 30.5 PG — SIGNIFICANT CHANGE UP (ref 27–31)
MCHC RBC-ENTMCNC: 32.9 G/DL — SIGNIFICANT CHANGE UP (ref 32–36)
MCV RBC AUTO: 92.7 FL — SIGNIFICANT CHANGE UP (ref 81–99)
MONOCYTES # BLD AUTO: 0.9 K/UL — HIGH (ref 0–0.8)
MONOCYTES NFR BLD AUTO: 7.1 % — SIGNIFICANT CHANGE UP (ref 3–10)
NEUTROPHILS # BLD AUTO: 8.9 K/UL — HIGH (ref 1.8–8)
NEUTROPHILS NFR BLD AUTO: 69.6 % — SIGNIFICANT CHANGE UP (ref 37–73)
NITRITE UR-MCNC: POSITIVE
PH UR: 7 — SIGNIFICANT CHANGE UP (ref 5–8)
PLATELET # BLD AUTO: 251 K/UL — SIGNIFICANT CHANGE UP (ref 150–400)
POTASSIUM SERPL-MCNC: 4.7 MMOL/L — SIGNIFICANT CHANGE UP (ref 3.5–5.3)
POTASSIUM SERPL-SCNC: 4.7 MMOL/L — SIGNIFICANT CHANGE UP (ref 3.5–5.3)
PROT SERPL-MCNC: 8.5 G/DL — SIGNIFICANT CHANGE UP (ref 6.6–8.7)
PROT UR-MCNC: 30 MG/DL
RBC # BLD: 5.22 M/UL — HIGH (ref 4.4–5.2)
RBC # FLD: 13.7 % — SIGNIFICANT CHANGE UP (ref 11–15.6)
RBC CASTS # UR COMP ASSIST: ABNORMAL /HPF (ref 0–4)
SODIUM SERPL-SCNC: 144 MMOL/L — SIGNIFICANT CHANGE UP (ref 135–145)
SP GR SPEC: 1.01 — SIGNIFICANT CHANGE UP (ref 1.01–1.02)
T4 AB SER-ACNC: 7.2 UG/DL — SIGNIFICANT CHANGE UP (ref 4.5–12)
TSH SERPL-MCNC: 6.97 UIU/ML — HIGH (ref 0.27–4.2)
UROBILINOGEN FLD QL: NEGATIVE MG/DL — SIGNIFICANT CHANGE UP
WBC # BLD: 12.8 K/UL — HIGH (ref 4.8–10.8)
WBC # FLD AUTO: 12.8 K/UL — HIGH (ref 4.8–10.8)
WBC UR QL: SIGNIFICANT CHANGE UP

## 2019-05-13 PROCEDURE — 70450 CT HEAD/BRAIN W/O DYE: CPT | Mod: 26

## 2019-05-13 PROCEDURE — 99284 EMERGENCY DEPT VISIT MOD MDM: CPT

## 2019-05-13 RX ORDER — CEPHALEXIN 500 MG
500 CAPSULE ORAL ONCE
Refills: 0 | Status: COMPLETED | OUTPATIENT
Start: 2019-05-13 | End: 2019-05-13

## 2019-05-13 RX ORDER — CEPHALEXIN 500 MG
1 CAPSULE ORAL
Qty: 20 | Refills: 0
Start: 2019-05-13 | End: 2019-05-17

## 2019-05-13 RX ORDER — SODIUM CHLORIDE 9 MG/ML
1000 INJECTION INTRAMUSCULAR; INTRAVENOUS; SUBCUTANEOUS ONCE
Refills: 0 | Status: COMPLETED | OUTPATIENT
Start: 2019-05-13 | End: 2019-05-13

## 2019-05-13 RX ADMIN — Medication 500 MILLIGRAM(S): at 22:36

## 2019-05-13 RX ADMIN — SODIUM CHLORIDE 1000 MILLILITER(S): 9 INJECTION INTRAMUSCULAR; INTRAVENOUS; SUBCUTANEOUS at 21:00

## 2019-05-13 NOTE — ED ADULT NURSE NOTE - NSIMPLEMENTINTERV_GEN_ALL_ED
Implemented All Fall with Harm Risk Interventions:  [place] to call system. Call bell, personal items and telephone within reach. Instruct patient to call for assistance. Room bathroom lighting operational. Non-slip footwear when patient is off stretcher. Physically safe environment: no spills, clutter or unnecessary equipment. Stretcher in lowest position, wheels locked, appropriate side rails in place. Provide visual cue, wrist band, yellow gown, etc. Monitor gait and stability. Monitor for mental status changes and reorient to person, place, and time. Review medications for side effects contributing to fall risk. Reinforce activity limits and safety measures with patient and family. Provide visual clues: red socks.

## 2019-05-13 NOTE — ED ADULT NURSE REASSESSMENT NOTE - NS ED NURSE REASSESS COMMENT FT1
Discharge instructions reviewed with EMS and  who verbalized understanding. Report given ANAND Stratton  at [place]. BP elevated, Dr Marques made aware. Ok fo discharge as per MD Marques, no inter Discharge instructions reviewed with EMS and  who verbalized understanding. Report given ANAND Stratton  at [place]. BP elevated, Dr Marques made aware. Ok fo discharge as per MD Marques, no interventions needed, pt to go back to facility per MD. RN Made aware. Discharge instructions reviewed with EMS and  who verbalized understanding. Report given ANAND Stratton  at [facility]. BP elevated, Dr Marques made aware. Pt is asymptomatic at this time. Ok fo discharge as per MD Marques, no interventions needed for BP pt to go back to facility per MD. RN at [facility] made aware.

## 2019-05-13 NOTE — ED PROVIDER NOTE - OBJECTIVE STATEMENT
79 year old female with PMH CVA, dementia, HTN presents with AMS. As per staff at SNF, the pt was acting aggressively today, screamign in the dining area. No trauma. Pt is calm and cooperative upon eval and states that she does not know why she is here an deneis any complaints at this time. As per family at bedside, the pt often gets this way she gets a uti. Deneis chest pain, fever, chills, headache.

## 2019-05-13 NOTE — ED PROVIDER NOTE - CONSTITUTIONAL, MLM
normal... Well appearing, well nourished, awake, alert, oriented to person,only and in no apparent distress.

## 2019-05-13 NOTE — ED ADULT NURSE NOTE - OBJECTIVE STATEMENT
79 year old female a&ox3, confused to date. pt. comes to ED sent from [facility] for increased confusion and agitation. as per  pt. has hx of dementia. pt. has hx of a stroke a couple of years a go, pt. ambulates with a walker. pt. denies pain or discomfort at this time. pt. in no apparent distress at this time, breathing even and unlabored.

## 2019-05-13 NOTE — ED ADULT NURSE REASSESSMENT NOTE - NS ED NURSE REASSESS COMMENT FT1
Assumed care of patient from ongoing RN, charting as noted, alert and oriented to person and place, confused to time. Hx of dementia. Resting comfortably in stretcher. VSS. Straight cath done per MD order using steryl technique. 300ML clear yellow urine noted to bag. Urine sent to lab.  at bedside. Pt calm and cooperative. Comfort measures provided. Awaiting results, safety maintained.

## 2019-05-13 NOTE — ED PROVIDER NOTE - CLINICAL SUMMARY MEDICAL DECISION MAKING FREE TEXT BOX
no agiation or ams in ed + uti spok ewith rn [facility] return to ed for untrolled fever change in mental status or any overall worsening

## 2019-05-13 NOTE — ED ADULT TRIAGE NOTE - CHIEF COMPLAINT QUOTE
Pt sent in from facility for increase in AMS. Pt normally with dementia but staff stated that she is more confused than normal. Pt is pleasant in triage Pt sent in from facility for increase in AMS. Pt normally with dementia but staff stated that she is more confused than normal. Was yelling about "eating penises" during lunch.  Pt is pleasant in triage Pt sent in from facility for increase in AMS. Pt normally with dementia but staff stated that she is more confused than normal. Was yelling about "eating penis" during lunch.  Pt is pleasant in triage

## 2019-05-13 NOTE — ED ADULT NURSE NOTE - CHIEF COMPLAINT QUOTE
Pt sent in from facility for increase in AMS. Pt normally with dementia but staff stated that she is more confused than normal. Was yelling about "eating penis" during lunch.  Pt is pleasant in triage

## 2019-05-16 ENCOUNTER — INPATIENT (INPATIENT)
Facility: HOSPITAL | Age: 79
LOS: 1 days | Discharge: HOME | DRG: 690 | End: 2019-05-18
Attending: INTERNAL MEDICINE | Admitting: HOSPITALIST
Payer: COMMERCIAL

## 2019-05-16 VITALS
SYSTOLIC BLOOD PRESSURE: 147 MMHG | HEIGHT: 66 IN | RESPIRATION RATE: 18 BRPM | WEIGHT: 169.98 LBS | TEMPERATURE: 98 F | DIASTOLIC BLOOD PRESSURE: 87 MMHG | HEART RATE: 59 BPM | OXYGEN SATURATION: 95 %

## 2019-05-16 DIAGNOSIS — A49.9 BACTERIAL INFECTION, UNSPECIFIED: ICD-10-CM

## 2019-05-16 LAB
-  AMIKACIN: SIGNIFICANT CHANGE UP
-  AMPICILLIN/SULBACTAM: SIGNIFICANT CHANGE UP
-  AMPICILLIN: SIGNIFICANT CHANGE UP
-  AZTREONAM: SIGNIFICANT CHANGE UP
-  CEFAZOLIN: SIGNIFICANT CHANGE UP
-  CEFEPIME: SIGNIFICANT CHANGE UP
-  CEFOXITIN: SIGNIFICANT CHANGE UP
-  CEFTRIAXONE: SIGNIFICANT CHANGE UP
-  CIPROFLOXACIN: SIGNIFICANT CHANGE UP
-  ERTAPENEM: SIGNIFICANT CHANGE UP
-  GENTAMICIN: SIGNIFICANT CHANGE UP
-  IMIPENEM: SIGNIFICANT CHANGE UP
-  LEVOFLOXACIN: SIGNIFICANT CHANGE UP
-  MEROPENEM: SIGNIFICANT CHANGE UP
-  NITROFURANTOIN: SIGNIFICANT CHANGE UP
-  PIPERACILLIN/TAZOBACTAM: SIGNIFICANT CHANGE UP
-  TIGECYCLINE: SIGNIFICANT CHANGE UP
-  TOBRAMYCIN: SIGNIFICANT CHANGE UP
-  TRIMETHOPRIM/SULFAMETHOXAZOLE: SIGNIFICANT CHANGE UP
ALBUMIN SERPL ELPH-MCNC: 3.9 G/DL — SIGNIFICANT CHANGE UP (ref 3.3–5.2)
ALP SERPL-CCNC: 145 U/L — HIGH (ref 40–120)
ALT FLD-CCNC: 25 U/L — SIGNIFICANT CHANGE UP
ANION GAP SERPL CALC-SCNC: 14 MMOL/L — SIGNIFICANT CHANGE UP (ref 5–17)
APPEARANCE UR: CLEAR — SIGNIFICANT CHANGE UP
APTT BLD: 30.9 SEC — SIGNIFICANT CHANGE UP (ref 27.5–36.3)
AST SERPL-CCNC: 29 U/L — SIGNIFICANT CHANGE UP
BACTERIA # UR AUTO: NEGATIVE — SIGNIFICANT CHANGE UP
BASOPHILS # BLD AUTO: 0 K/UL — SIGNIFICANT CHANGE UP (ref 0–0.2)
BASOPHILS NFR BLD AUTO: 0.3 % — SIGNIFICANT CHANGE UP (ref 0–2)
BILIRUB SERPL-MCNC: 0.7 MG/DL — SIGNIFICANT CHANGE UP (ref 0.4–2)
BILIRUB UR-MCNC: NEGATIVE — SIGNIFICANT CHANGE UP
BUN SERPL-MCNC: 20 MG/DL — SIGNIFICANT CHANGE UP (ref 8–20)
CALCIUM SERPL-MCNC: 9.5 MG/DL — SIGNIFICANT CHANGE UP (ref 8.6–10.2)
CHLORIDE SERPL-SCNC: 107 MMOL/L — SIGNIFICANT CHANGE UP (ref 98–107)
CO2 SERPL-SCNC: 24 MMOL/L — SIGNIFICANT CHANGE UP (ref 22–29)
COLOR SPEC: YELLOW — SIGNIFICANT CHANGE UP
CREAT SERPL-MCNC: 0.79 MG/DL — SIGNIFICANT CHANGE UP (ref 0.5–1.3)
CULTURE RESULTS: SIGNIFICANT CHANGE UP
DIFF PNL FLD: ABNORMAL
EOSINOPHIL # BLD AUTO: 0.2 K/UL — SIGNIFICANT CHANGE UP (ref 0–0.5)
EOSINOPHIL NFR BLD AUTO: 2.1 % — SIGNIFICANT CHANGE UP (ref 0–6)
EPI CELLS # UR: SIGNIFICANT CHANGE UP
GLUCOSE SERPL-MCNC: 93 MG/DL — SIGNIFICANT CHANGE UP (ref 70–115)
GLUCOSE UR QL: NEGATIVE MG/DL — SIGNIFICANT CHANGE UP
HCT VFR BLD CALC: 47.4 % — HIGH (ref 37–47)
HGB BLD-MCNC: 15.3 G/DL — SIGNIFICANT CHANGE UP (ref 12–16)
INR BLD: 1.01 RATIO — SIGNIFICANT CHANGE UP (ref 0.88–1.16)
KETONES UR-MCNC: NEGATIVE — SIGNIFICANT CHANGE UP
LACTATE BLDV-MCNC: 1 MMOL/L — SIGNIFICANT CHANGE UP (ref 0.5–2)
LACTATE BLDV-MCNC: 2.2 MMOL/L — HIGH (ref 0.5–2)
LEUKOCYTE ESTERASE UR-ACNC: ABNORMAL
LYMPHOCYTES # BLD AUTO: 2.6 K/UL — SIGNIFICANT CHANGE UP (ref 1–4.8)
LYMPHOCYTES # BLD AUTO: 29.6 % — SIGNIFICANT CHANGE UP (ref 20–55)
MCHC RBC-ENTMCNC: 30.9 PG — SIGNIFICANT CHANGE UP (ref 27–31)
MCHC RBC-ENTMCNC: 32.3 G/DL — SIGNIFICANT CHANGE UP (ref 32–36)
MCV RBC AUTO: 95.8 FL — SIGNIFICANT CHANGE UP (ref 81–99)
METHOD TYPE: SIGNIFICANT CHANGE UP
MONOCYTES # BLD AUTO: 0.7 K/UL — SIGNIFICANT CHANGE UP (ref 0–0.8)
MONOCYTES NFR BLD AUTO: 7.5 % — SIGNIFICANT CHANGE UP (ref 3–10)
NEUTROPHILS # BLD AUTO: 5.3 K/UL — SIGNIFICANT CHANGE UP (ref 1.8–8)
NEUTROPHILS NFR BLD AUTO: 60.3 % — SIGNIFICANT CHANGE UP (ref 37–73)
NITRITE UR-MCNC: NEGATIVE — SIGNIFICANT CHANGE UP
ORGANISM # SPEC MICROSCOPIC CNT: SIGNIFICANT CHANGE UP
ORGANISM # SPEC MICROSCOPIC CNT: SIGNIFICANT CHANGE UP
PH UR: 6 — SIGNIFICANT CHANGE UP (ref 5–8)
PLATELET # BLD AUTO: 269 K/UL — SIGNIFICANT CHANGE UP (ref 150–400)
POTASSIUM SERPL-MCNC: 4.3 MMOL/L — SIGNIFICANT CHANGE UP (ref 3.5–5.3)
POTASSIUM SERPL-SCNC: 4.3 MMOL/L — SIGNIFICANT CHANGE UP (ref 3.5–5.3)
PROT SERPL-MCNC: 7.5 G/DL — SIGNIFICANT CHANGE UP (ref 6.6–8.7)
PROT UR-MCNC: 15 MG/DL
PROTHROM AB SERPL-ACNC: 11.6 SEC — SIGNIFICANT CHANGE UP (ref 10–12.9)
RBC # BLD: 4.95 M/UL — SIGNIFICANT CHANGE UP (ref 4.4–5.2)
RBC # FLD: 13.6 % — SIGNIFICANT CHANGE UP (ref 11–15.6)
RBC CASTS # UR COMP ASSIST: NEGATIVE /HPF — SIGNIFICANT CHANGE UP (ref 0–4)
SODIUM SERPL-SCNC: 145 MMOL/L — SIGNIFICANT CHANGE UP (ref 135–145)
SP GR SPEC: 1.02 — SIGNIFICANT CHANGE UP (ref 1.01–1.02)
SPECIMEN SOURCE: SIGNIFICANT CHANGE UP
UROBILINOGEN FLD QL: NEGATIVE MG/DL — SIGNIFICANT CHANGE UP
WBC # BLD: 8.8 K/UL — SIGNIFICANT CHANGE UP (ref 4.8–10.8)
WBC # FLD AUTO: 8.8 K/UL — SIGNIFICANT CHANGE UP (ref 4.8–10.8)
WBC UR QL: SIGNIFICANT CHANGE UP

## 2019-05-16 PROCEDURE — 93010 ELECTROCARDIOGRAM REPORT: CPT

## 2019-05-16 PROCEDURE — 71045 X-RAY EXAM CHEST 1 VIEW: CPT | Mod: 26

## 2019-05-16 PROCEDURE — 99285 EMERGENCY DEPT VISIT HI MDM: CPT

## 2019-05-16 PROCEDURE — 99222 1ST HOSP IP/OBS MODERATE 55: CPT

## 2019-05-16 RX ORDER — ATORVASTATIN CALCIUM 80 MG/1
10 TABLET, FILM COATED ORAL AT BEDTIME
Refills: 0 | Status: DISCONTINUED | OUTPATIENT
Start: 2019-05-16 | End: 2019-05-18

## 2019-05-16 RX ORDER — ERTAPENEM SODIUM 1 G/1
1000 INJECTION, POWDER, LYOPHILIZED, FOR SOLUTION INTRAMUSCULAR; INTRAVENOUS EVERY 24 HOURS
Refills: 0 | Status: DISCONTINUED | OUTPATIENT
Start: 2019-05-17 | End: 2019-05-18

## 2019-05-16 RX ORDER — SODIUM CHLORIDE 9 MG/ML
2400 INJECTION INTRAMUSCULAR; INTRAVENOUS; SUBCUTANEOUS ONCE
Refills: 0 | Status: COMPLETED | OUTPATIENT
Start: 2019-05-16 | End: 2019-05-16

## 2019-05-16 RX ORDER — ERTAPENEM SODIUM 1 G/1
1000 INJECTION, POWDER, LYOPHILIZED, FOR SOLUTION INTRAMUSCULAR; INTRAVENOUS ONCE
Refills: 0 | Status: COMPLETED | OUTPATIENT
Start: 2019-05-16 | End: 2019-05-16

## 2019-05-16 RX ORDER — ENOXAPARIN SODIUM 100 MG/ML
40 INJECTION SUBCUTANEOUS DAILY
Refills: 0 | Status: DISCONTINUED | OUTPATIENT
Start: 2019-05-16 | End: 2019-05-18

## 2019-05-16 RX ORDER — ATENOLOL 25 MG/1
12.5 TABLET ORAL DAILY
Refills: 0 | Status: DISCONTINUED | OUTPATIENT
Start: 2019-05-16 | End: 2019-05-18

## 2019-05-16 RX ORDER — ASPIRIN/CALCIUM CARB/MAGNESIUM 324 MG
81 TABLET ORAL DAILY
Refills: 0 | Status: DISCONTINUED | OUTPATIENT
Start: 2019-05-16 | End: 2019-05-18

## 2019-05-16 RX ORDER — AMLODIPINE BESYLATE 2.5 MG/1
5 TABLET ORAL DAILY
Refills: 0 | Status: DISCONTINUED | OUTPATIENT
Start: 2019-05-16 | End: 2019-05-18

## 2019-05-16 RX ORDER — SACCHAROMYCES BOULARDII 250 MG
250 POWDER IN PACKET (EA) ORAL
Refills: 0 | Status: DISCONTINUED | OUTPATIENT
Start: 2019-05-16 | End: 2019-05-18

## 2019-05-16 RX ORDER — PIPERACILLIN AND TAZOBACTAM 4; .5 G/20ML; G/20ML
3.38 INJECTION, POWDER, LYOPHILIZED, FOR SOLUTION INTRAVENOUS ONCE
Refills: 0 | Status: DISCONTINUED | OUTPATIENT
Start: 2019-05-16 | End: 2019-05-16

## 2019-05-16 RX ORDER — MEMANTINE HYDROCHLORIDE 10 MG/1
5 TABLET ORAL
Refills: 0 | Status: DISCONTINUED | OUTPATIENT
Start: 2019-05-16 | End: 2019-05-18

## 2019-05-16 RX ADMIN — Medication 250 MILLIGRAM(S): at 18:59

## 2019-05-16 RX ADMIN — ENOXAPARIN SODIUM 40 MILLIGRAM(S): 100 INJECTION SUBCUTANEOUS at 19:07

## 2019-05-16 RX ADMIN — MEMANTINE HYDROCHLORIDE 5 MILLIGRAM(S): 10 TABLET ORAL at 18:59

## 2019-05-16 RX ADMIN — ATORVASTATIN CALCIUM 10 MILLIGRAM(S): 80 TABLET, FILM COATED ORAL at 21:18

## 2019-05-16 RX ADMIN — AMLODIPINE BESYLATE 5 MILLIGRAM(S): 2.5 TABLET ORAL at 19:07

## 2019-05-16 RX ADMIN — SODIUM CHLORIDE 2400 MILLILITER(S): 9 INJECTION INTRAMUSCULAR; INTRAVENOUS; SUBCUTANEOUS at 12:34

## 2019-05-16 RX ADMIN — SODIUM CHLORIDE 2400 MILLILITER(S): 9 INJECTION INTRAMUSCULAR; INTRAVENOUS; SUBCUTANEOUS at 13:41

## 2019-05-16 RX ADMIN — ERTAPENEM SODIUM 120 MILLIGRAM(S): 1 INJECTION, POWDER, LYOPHILIZED, FOR SOLUTION INTRAMUSCULAR; INTRAVENOUS at 12:40

## 2019-05-16 RX ADMIN — ERTAPENEM SODIUM 1000 MILLIGRAM(S): 1 INJECTION, POWDER, LYOPHILIZED, FOR SOLUTION INTRAMUSCULAR; INTRAVENOUS at 13:10

## 2019-05-16 NOTE — ED POST DISCHARGE NOTE - DETAILS
Spoke to [facility] where pt resides, spoke to ANAND Morales, informed of +ESBL result and need for pt to return to ED immediately, RN verbalized understanding and will arrange transportation back to ED immediately.

## 2019-05-16 NOTE — ED ADULT NURSE NOTE - NSIMPLEMENTINTERV_GEN_ALL_ED
Implemented All Fall with Harm Risk Interventions:  [place] to call system. Call bell, personal items and telephone within reach. Instruct patient to call for assistance. Room bathroom lighting operational. Non-slip footwear when patient is off stretcher. Physically safe environment: no spills, clutter or unnecessary equipment. Stretcher in lowest position, wheels locked, appropriate side rails in place. Provide visual cue, wrist band, yellow gown, etc. Monitor gait and stability. Monitor for mental status changes and reorient to person, place, and time. Review medications for side effects contributing to fall risk. Reinforce activity limits and safety measures with patient and family. Provide visual clues: red socks.

## 2019-05-16 NOTE — ED ADULT NURSE NOTE - CHPI ED NUR SYMPTOMS NEG
no nausea/no chills/no abdominal distension/no diarrhea/no dysuria/no blood in stool/no hematuria/no fever

## 2019-05-16 NOTE — ED ADULT TRIAGE NOTE - CHIEF COMPLAINT QUOTE
Patient BIBA to ED today from [place] [facility] where she is a resident for treatment of UTI.  Patient was seen in ED and released from hospital after UTI treatment.  Staff at [facility] told EMS reason for call to hospital was to have patient receive IV antibiotics.

## 2019-05-16 NOTE — H&P ADULT - ASSESSMENT
79F with a prior evaluation for urinary tract infection and culture growing ESBL ecoli    Urinary tract infection - On ertapenem. Culture results to be followed.    Dementia - On memantine. Cooperative with examination.    Hypertension - Close blood pressure monitoring.    History of CVA - On atorvastatin.

## 2019-05-16 NOTE — H&P ADULT - NSHPPHYSICALEXAM_GEN_ALL_CORE
Vital Signs Last 24 Hrs  T(C): 36.5 (16 May 2019 10:25), Max: 36.5 (16 May 2019 10:25)  T(F): 97.7 (16 May 2019 10:25), Max: 97.7 (16 May 2019 10:25)  HR: 59 (16 May 2019 10:25) (59 - 59)  BP: 147/87 (16 May 2019 10:25) (147/87 - 147/87)  BP(mean): --  RR: 18 (16 May 2019 10:25) (18 - 18)  SpO2: 95% (16 May 2019 10:25) (95% - 95%)    General appearance: No acute distress, Awake, Alert  HEENT: Normocephalic, Atraumatic, Conjunctiva clear, EOMI  Neck: Supple, No JVD, No tenderness  Lungs: Breath sound equal bilaterally, No wheezes, No rales  Cardiovascular: S1S2, Regular rhythm  Abdomen: Soft, Nontender, Nondistended, No guarding/rebound, Positive bowel sounds  Extremities: No clubbing, No cyanosis, No edema, No calf tenderness  Neuro: Strength equal bilaterally, No tremors  Psychiatric: Appropriate mood, Normal affect

## 2019-05-16 NOTE — H&P ADULT - NSICDXPASTSURGICALHX_GEN_ALL_CORE_FT
PAST SURGICAL HISTORY:  S/P colonoscopy with polypectomy     S/P inguinal hernia repair Bilateral  age 17

## 2019-05-16 NOTE — ED ADULT NURSE NOTE - CHIEF COMPLAINT QUOTE
Patient BIBA to ED today from [facility] where she is a resident for treatment of UTI.  Patient was seen in ED and released from hospital after UTI treatment.  Staff at [facility] told EMS reason for call to hospital was to have patient receive IV antibiotics.

## 2019-05-16 NOTE — ED ADULT NURSE NOTE - OBJECTIVE STATEMENT
Pt presents to ED for IV antibiotic treatment for UTI. Pt was in ED two days ago. Pt denies fevers, chills, NVD, difficultly urinating, sob and chest pain. Pt is A&OX2, forgetfulness. Pt presents to ED for IV antibiotic treatment for UTI. Pt was in ED two days ago. Pt denies fevers, chills, NVD, difficultly urinating, sob and chest pain.

## 2019-05-16 NOTE — H&P ADULT - HISTORY OF PRESENT ILLNESS
79F who was previously evaluated for altered mental status and found to have a urinary tract infection and discharged on oral antibiotics. The urine culture later grew ESBL Ecoli and the patient as contacted to return to  hospital for intravenous antibiotics. The patient denied any urinary frequency or dysuria. She denied any hematuria. The patient is noted to have a history of dementia and is unable to provide any details in regards to her medical history. On interview, she denied any specific complaints.

## 2019-05-16 NOTE — H&P ADULT - NSHPSOCIALHISTORY_GEN_ALL_CORE
Denied tobacco, alcohol, or illicit drug use  Resides at the [facility]    Family History: The patient is unable to provide information due to her medical issues

## 2019-05-17 LAB
CULTURE RESULTS: NO GROWTH — SIGNIFICANT CHANGE UP
SPECIMEN SOURCE: SIGNIFICANT CHANGE UP

## 2019-05-17 PROCEDURE — 99232 SBSQ HOSP IP/OBS MODERATE 35: CPT

## 2019-05-17 RX ADMIN — AMLODIPINE BESYLATE 5 MILLIGRAM(S): 2.5 TABLET ORAL at 06:34

## 2019-05-17 RX ADMIN — MEMANTINE HYDROCHLORIDE 5 MILLIGRAM(S): 10 TABLET ORAL at 17:13

## 2019-05-17 RX ADMIN — ATORVASTATIN CALCIUM 10 MILLIGRAM(S): 80 TABLET, FILM COATED ORAL at 21:53

## 2019-05-17 RX ADMIN — MEMANTINE HYDROCHLORIDE 5 MILLIGRAM(S): 10 TABLET ORAL at 06:33

## 2019-05-17 RX ADMIN — ATENOLOL 12.5 MILLIGRAM(S): 25 TABLET ORAL at 06:33

## 2019-05-17 RX ADMIN — Medication 81 MILLIGRAM(S): at 12:31

## 2019-05-17 RX ADMIN — Medication 250 MILLIGRAM(S): at 06:36

## 2019-05-17 RX ADMIN — ENOXAPARIN SODIUM 40 MILLIGRAM(S): 100 INJECTION SUBCUTANEOUS at 12:31

## 2019-05-17 RX ADMIN — ERTAPENEM SODIUM 120 MILLIGRAM(S): 1 INJECTION, POWDER, LYOPHILIZED, FOR SOLUTION INTRAMUSCULAR; INTRAVENOUS at 06:33

## 2019-05-17 RX ADMIN — Medication 250 MILLIGRAM(S): at 17:13

## 2019-05-17 NOTE — PROGRESS NOTE ADULT - SUBJECTIVE AND OBJECTIVE BOX
FLORIDALMA SKAGGS  ----------------------------------------  The patient was seen and evaluated for ESBL UTI.  The patient is in no acute distress.  Denied any chest pain, palpitations, dyspnea, or abdominal pain.  Offers no complaints.    Vital Signs Last 24 Hrs  T(C): 36.5 (17 May 2019 10:46), Max: 36.8 (17 May 2019 05:09)  T(F): 97.7 (17 May 2019 10:46), Max: 98.2 (17 May 2019 05:09)  HR: 80 (17 May 2019 10:46) (59 - 80)  BP: 143/85 (17 May 2019 10:46) (121/68 - 164/87)  BP(mean): --  RR: 18 (17 May 2019 10:46) (18 - 18)  SpO2: 99% (17 May 2019 10:46) (93% - 99%)    PHYSICAL EXAMINATION:  ----------------------------------------  General appearance: No acute distress, Awake, Alert  HEENT: Normocephalic, Atraumatic, Conjunctiva clear, EOMI  Neck: Supple, No JVD, No tenderness  Lungs: Breath sound equal bilaterally, No wheezes, No rales  Cardiovascular: S1S2, Regular rhythm  Abdomen: Soft, Nontender, Nondistended, No guarding/rebound, Positive bowel sounds  Extremities: No clubbing, No cyanosis, No edema, No calf tenderness  Neuro: Strength equal bilaterally, No tremors  Psychiatric: Appropriate mood, Normal affect    LABORATORY STUDIES:  ----------------------------------------             15.3   8.8   )-----------( 269      ( 16 May 2019 12:43 )             47.4     05-16    145  |  107  |  20.0  ----------------------------<  93  4.3   |  24.0  |  0.79    Ca    9.5      16 May 2019 12:43    TPro  7.5  /  Alb  3.9  /  TBili  0.7  /  DBili  x   /  AST  29  /  ALT  25  /  AlkPhos  145<H>  05-16    LIVER FUNCTIONS - ( 16 May 2019 12:43 )  Alb: 3.9 g/dL / Pro: 7.5 g/dL / ALK PHOS: 145 U/L / ALT: 25 U/L / AST: 29 U/L / GGT: x           PT/INR - ( 16 May 2019 12:43 )   PT: 11.6 sec;   INR: 1.01 ratio    PTT - ( 16 May 2019 12:43 )  PTT:30.9 sec    Urinalysis Basic - ( 16 May 2019 12:46 )  Color: Yellow / Appearance: Clear / S.020 / pH: x  Gluc: x / Ketone: Negative  / Bili: Negative / Urobili: Negative mg/dL   Blood: x / Protein: 15 mg/dL / Nitrite: Negative   Leuk Esterase: Trace / RBC: Negative /HPF / WBC 3-5   Sq Epi: x / Non Sq Epi: Occasional / Bacteria: Negative    Culture - Urine (collected 16 May 2019 12:47)  Source: .Urine  Final Report (17 May 2019 09:50):    No growth    MEDICATIONS  (STANDING):  amLODIPine   Tablet 5 milliGRAM(s) Oral daily  aspirin enteric coated 81 milliGRAM(s) Oral daily  ATENolol  Tablet 12.5 milliGRAM(s) Oral daily  atorvastatin 10 milliGRAM(s) Oral at bedtime  enoxaparin Injectable 40 milliGRAM(s) SubCutaneous daily  ertapenem  IVPB 1000 milliGRAM(s) IV Intermittent every 24 hours  memantine 5 milliGRAM(s) Oral two times a day  saccharomyces boulardii 250 milliGRAM(s) Oral two times a day      ASSESSMENT / PLAN:  ----------------------------------------  79F from [place] referred to the hospital with urine culture showing ESBL ecoli.    ESBL ecoli UTI - On ertapenem. Afebrile. Blood culture results to be followed.    Dementia - On memantine. Cooperative with examination.    Hypertension - Close blood pressure monitoring.    History of CVA - On atorvastatin.

## 2019-05-17 NOTE — PATIENT PROFILE ADULT - NSPROMUTPARTICIPCAREFT_GEN_A_NUR
Â· Ice to affected area for approximately 10-15 minutes, 4 times a day for 2 days. After 2 days use heat for 10-15 minutes 4 times a day. If heat makes things worse, go back to ice. Â· Range of motion exercises as tolerated. Â· Take medications as directed. GI side effects with anti-inflammatories discussed. Â· Follow-up with Occupational Medicine tomorrow. Â· Await Physical Therapy.
NA

## 2019-05-18 VITALS
TEMPERATURE: 98 F | HEART RATE: 57 BPM | DIASTOLIC BLOOD PRESSURE: 75 MMHG | OXYGEN SATURATION: 94 % | RESPIRATION RATE: 18 BRPM | SYSTOLIC BLOOD PRESSURE: 118 MMHG

## 2019-05-18 PROCEDURE — 71045 X-RAY EXAM CHEST 1 VIEW: CPT

## 2019-05-18 PROCEDURE — 93005 ELECTROCARDIOGRAM TRACING: CPT

## 2019-05-18 PROCEDURE — 99238 HOSP IP/OBS DSCHRG MGMT 30/<: CPT

## 2019-05-18 PROCEDURE — 83735 ASSAY OF MAGNESIUM: CPT

## 2019-05-18 PROCEDURE — 96365 THER/PROPH/DIAG IV INF INIT: CPT

## 2019-05-18 PROCEDURE — 85610 PROTHROMBIN TIME: CPT

## 2019-05-18 PROCEDURE — 83605 ASSAY OF LACTIC ACID: CPT

## 2019-05-18 PROCEDURE — 36415 COLL VENOUS BLD VENIPUNCTURE: CPT

## 2019-05-18 PROCEDURE — 84443 ASSAY THYROID STIM HORMONE: CPT

## 2019-05-18 PROCEDURE — 99285 EMERGENCY DEPT VISIT HI MDM: CPT | Mod: 25

## 2019-05-18 PROCEDURE — 84436 ASSAY OF TOTAL THYROXINE: CPT

## 2019-05-18 PROCEDURE — 85730 THROMBOPLASTIN TIME PARTIAL: CPT

## 2019-05-18 PROCEDURE — 70450 CT HEAD/BRAIN W/O DYE: CPT

## 2019-05-18 PROCEDURE — 96361 HYDRATE IV INFUSION ADD-ON: CPT

## 2019-05-18 PROCEDURE — 87186 SC STD MICRODIL/AGAR DIL: CPT

## 2019-05-18 PROCEDURE — 87040 BLOOD CULTURE FOR BACTERIA: CPT

## 2019-05-18 PROCEDURE — 85027 COMPLETE CBC AUTOMATED: CPT

## 2019-05-18 PROCEDURE — 81001 URINALYSIS AUTO W/SCOPE: CPT

## 2019-05-18 PROCEDURE — 99284 EMERGENCY DEPT VISIT MOD MDM: CPT | Mod: 25

## 2019-05-18 PROCEDURE — 80053 COMPREHEN METABOLIC PANEL: CPT

## 2019-05-18 PROCEDURE — 87086 URINE CULTURE/COLONY COUNT: CPT

## 2019-05-18 PROCEDURE — 51701 INSERT BLADDER CATHETER: CPT

## 2019-05-18 RX ADMIN — ENOXAPARIN SODIUM 40 MILLIGRAM(S): 100 INJECTION SUBCUTANEOUS at 11:35

## 2019-05-18 RX ADMIN — Medication 81 MILLIGRAM(S): at 11:36

## 2019-05-18 RX ADMIN — Medication 250 MILLIGRAM(S): at 17:14

## 2019-05-18 RX ADMIN — MEMANTINE HYDROCHLORIDE 5 MILLIGRAM(S): 10 TABLET ORAL at 05:28

## 2019-05-18 RX ADMIN — ATENOLOL 12.5 MILLIGRAM(S): 25 TABLET ORAL at 05:27

## 2019-05-18 RX ADMIN — MEMANTINE HYDROCHLORIDE 5 MILLIGRAM(S): 10 TABLET ORAL at 17:14

## 2019-05-18 RX ADMIN — AMLODIPINE BESYLATE 5 MILLIGRAM(S): 2.5 TABLET ORAL at 05:28

## 2019-05-18 RX ADMIN — ERTAPENEM SODIUM 120 MILLIGRAM(S): 1 INJECTION, POWDER, LYOPHILIZED, FOR SOLUTION INTRAMUSCULAR; INTRAVENOUS at 05:26

## 2019-05-18 RX ADMIN — Medication 250 MILLIGRAM(S): at 05:27

## 2019-05-18 NOTE — DISCHARGE NOTE PROVIDER - NSDCCPCAREPLAN_GEN_ALL_CORE_FT
PRINCIPAL DISCHARGE DIAGNOSIS  Diagnosis: UTI due to extended-spectrum beta lactamase (ESBL) producing Escherichia coli  Assessment and Plan of Treatment: received 3 doses of ertapenem.  urinalysis, urine culture and blood cultures negative.   follow up with primary care doctor      SECONDARY DISCHARGE DIAGNOSES  Diagnosis: Dementia without behavioral disturbance  Assessment and Plan of Treatment:

## 2019-05-18 NOTE — DISCHARGE NOTE NURSING/CASE MANAGEMENT/SOCIAL WORK - NSDCDPATPORTLINK_GEN_ALL_CORE
You can access the [facility] Patient Portal, offered by [facility], by registering with the following website: [email or web address]

## 2019-05-18 NOTE — DISCHARGE NOTE PROVIDER - HOSPITAL COURSE
79F w/ hx dementia from [place] referred to the hospital with urine culture showing ESBL ecoli.    Blood cultures and urine culture negative on admission.  Urinalysis not indicative of infection.    received 3 doses of ertapenem while in hospital        stable for discharge back to facility.        no changes to home medications.

## 2019-05-21 LAB
CULTURE RESULTS: SIGNIFICANT CHANGE UP
CULTURE RESULTS: SIGNIFICANT CHANGE UP
SPECIMEN SOURCE: SIGNIFICANT CHANGE UP
SPECIMEN SOURCE: SIGNIFICANT CHANGE UP

## 2019-06-04 ENCOUNTER — EMERGENCY (EMERGENCY)
Facility: HOSPITAL | Age: 79
LOS: 1 days | Discharge: HOME | End: 2019-06-04
Attending: EMERGENCY MEDICINE
Payer: COMMERCIAL

## 2019-06-04 VITALS
SYSTOLIC BLOOD PRESSURE: 157 MMHG | HEART RATE: 71 BPM | RESPIRATION RATE: 18 BRPM | DIASTOLIC BLOOD PRESSURE: 86 MMHG | TEMPERATURE: 98 F | OXYGEN SATURATION: 95 %

## 2019-06-04 VITALS
DIASTOLIC BLOOD PRESSURE: 84 MMHG | SYSTOLIC BLOOD PRESSURE: 134 MMHG | TEMPERATURE: 98 F | HEART RATE: 78 BPM | OXYGEN SATURATION: 96 % | RESPIRATION RATE: 20 BRPM

## 2019-06-04 PROCEDURE — 99283 EMERGENCY DEPT VISIT LOW MDM: CPT

## 2019-06-04 NOTE — ED ADULT NURSE NOTE - OBJECTIVE STATEMENT
presents to ED from [place] for unwitnessed fall in bathroom, found on floor. sent to r/o any injury, no obvious injuries or deformities noted, offering no complaints at this time. TU

## 2019-06-04 NOTE — ED PROVIDER NOTE - OBJECTIVE STATEMENT
pt with mechanical fall at [facility] and denies any complaints no ha no cp no sob no abd pain acting at [facility] per [facility] paperowrk no bruise on body she is pleasently demented

## 2019-06-04 NOTE — ED PROVIDER NOTE - CLINICAL SUMMARY MEDICAL DECISION MAKING FREE TEXT BOX
no signs of external trauma no ttp on any joint primary seoncdary exams do not feel clincally needs imaging pt in strestcher in nad. return to ed for intractable HA, persistent vomiting, or new onset motor/sensory deficits

## 2019-06-04 NOTE — ED ADULT NURSE NOTE - CHPI ED NUR SYMPTOMS NEG
no vomiting/no tingling/no weakness/no pain/no nausea/no fever/no dizziness/no chills/no decreased eating/drinking

## 2019-06-04 NOTE — ED ADULT NURSE NOTE - CHIEF COMPLAINT QUOTE
patient christal from [place], as per EMS patient states that patient had an unwitnessed fall and was found on the floor by staff at 1:50, patient denies any complaints of pain or discomfort at this time. patient not on any blood thinners. no obvious signs of trauma patient denies hitting her head. patient with hx of dimentia

## 2019-06-04 NOTE — ED ADULT TRIAGE NOTE - CHIEF COMPLAINT QUOTE
patient christal from [place], as per EMS patient states that patient had an unwitnessed fall and was found on the floor by staff at 1:50, patient denies any complaints of pain or discomfort at this time. patient not on any blood thinners. no obvious signs of trauma patient denies hitting her head. patient with hx of dimentia

## 2019-06-04 NOTE — ED ADULT NURSE NOTE - INTERVENTIONS DEFINITIONS
Reinforce activity limits and safety measures with patient and family/Physically safe environment: no spills, clutter or unnecessary equipment/Monitor for mental status changes and reorient to person, place, and time/Stretcher in lowest position, wheels locked, appropriate side rails in place/Monitor gait and stability

## 2020-04-16 NOTE — H&P ADULT - GENERAL
----- Message from Madina Walker sent at 4/16/2020 11:28 AM EDT -----  Regarding: Dr Christian Pablo: [phone]  Patient is not tolerating her feedings and is having a lot nausea and mom is asking if the doctor can prescribe medication for the nausea.  Please advise
Mother reports that patient is not tolerating the feeds through her g-tube, vomited las night and this morning, overnight rate is 30 mL/hr, on Sunday 4/12/20 patients gj tube came out so mom replaced it with a g-tube that she had on hand, patient was \"doing great with the gj tube\" but now seems to be vomiting large amounts, mother wants to know if patient should be prescribed anti-nausea medication as patient is vomiting, mother also wants to discuss going to a peptide based formula, scheduled [facility] to discuss further with Dr. Cricket Gurrola today at 1:20 PM.
details…

## 2020-04-27 ENCOUNTER — INPATIENT (INPATIENT)
Facility: HOSPITAL | Age: 80
LOS: 13 days | Discharge: HOME | DRG: 177 | End: 2020-05-11
Attending: FAMILY MEDICINE | Admitting: HOSPITALIST
Payer: COMMERCIAL

## 2020-04-27 VITALS
HEART RATE: 80 BPM | HEIGHT: 67 IN | DIASTOLIC BLOOD PRESSURE: 89 MMHG | SYSTOLIC BLOOD PRESSURE: 143 MMHG | OXYGEN SATURATION: 97 % | RESPIRATION RATE: 16 BRPM | WEIGHT: 184.97 LBS | TEMPERATURE: 99 F

## 2020-04-27 DIAGNOSIS — U07.1 COVID-19: ICD-10-CM

## 2020-04-27 LAB
ALBUMIN SERPL ELPH-MCNC: 4.1 G/DL — SIGNIFICANT CHANGE UP (ref 3.3–5.2)
ALP SERPL-CCNC: 131 U/L — HIGH (ref 40–120)
ALT FLD-CCNC: 24 U/L — SIGNIFICANT CHANGE UP
ANION GAP SERPL CALC-SCNC: 16 MMOL/L — SIGNIFICANT CHANGE UP (ref 5–17)
AST SERPL-CCNC: 35 U/L — HIGH
BASOPHILS # BLD AUTO: 0.01 K/UL — SIGNIFICANT CHANGE UP (ref 0–0.2)
BASOPHILS NFR BLD AUTO: 0.2 % — SIGNIFICANT CHANGE UP (ref 0–2)
BILIRUB SERPL-MCNC: 0.7 MG/DL — SIGNIFICANT CHANGE UP (ref 0.4–2)
BUN SERPL-MCNC: 22 MG/DL — HIGH (ref 8–20)
CALCIUM SERPL-MCNC: 9.2 MG/DL — SIGNIFICANT CHANGE UP (ref 8.6–10.2)
CHLORIDE SERPL-SCNC: 104 MMOL/L — SIGNIFICANT CHANGE UP (ref 98–107)
CO2 SERPL-SCNC: 25 MMOL/L — SIGNIFICANT CHANGE UP (ref 22–29)
CREAT SERPL-MCNC: 0.84 MG/DL — SIGNIFICANT CHANGE UP (ref 0.5–1.3)
CRP SERPL-MCNC: 4.81 MG/DL — HIGH (ref 0–0.4)
D DIMER BLD IA.RAPID-MCNC: 355 NG/ML DDU — HIGH
EOSINOPHIL # BLD AUTO: 0 K/UL — SIGNIFICANT CHANGE UP (ref 0–0.5)
EOSINOPHIL NFR BLD AUTO: 0 % — SIGNIFICANT CHANGE UP (ref 0–6)
FERRITIN SERPL-MCNC: 610 NG/ML — HIGH (ref 15–150)
GLUCOSE SERPL-MCNC: 102 MG/DL — HIGH (ref 70–99)
HCT VFR BLD CALC: 49.2 % — HIGH (ref 34.5–45)
HGB BLD-MCNC: 15.8 G/DL — HIGH (ref 11.5–15.5)
IMM GRANULOCYTES NFR BLD AUTO: 0.2 % — SIGNIFICANT CHANGE UP (ref 0–1.5)
LYMPHOCYTES # BLD AUTO: 1.34 K/UL — SIGNIFICANT CHANGE UP (ref 1–3.3)
LYMPHOCYTES # BLD AUTO: 24.6 % — SIGNIFICANT CHANGE UP (ref 13–44)
MCHC RBC-ENTMCNC: 30.7 PG — SIGNIFICANT CHANGE UP (ref 27–34)
MCHC RBC-ENTMCNC: 32.1 GM/DL — SIGNIFICANT CHANGE UP (ref 32–36)
MCV RBC AUTO: 95.5 FL — SIGNIFICANT CHANGE UP (ref 80–100)
MONOCYTES # BLD AUTO: 0.57 K/UL — SIGNIFICANT CHANGE UP (ref 0–0.9)
MONOCYTES NFR BLD AUTO: 10.5 % — SIGNIFICANT CHANGE UP (ref 2–14)
NEUTROPHILS # BLD AUTO: 3.52 K/UL — SIGNIFICANT CHANGE UP (ref 1.8–7.4)
NEUTROPHILS NFR BLD AUTO: 64.5 % — SIGNIFICANT CHANGE UP (ref 43–77)
PLATELET # BLD AUTO: 188 K/UL — SIGNIFICANT CHANGE UP (ref 150–400)
POTASSIUM SERPL-MCNC: 3.8 MMOL/L — SIGNIFICANT CHANGE UP (ref 3.5–5.3)
POTASSIUM SERPL-SCNC: 3.8 MMOL/L — SIGNIFICANT CHANGE UP (ref 3.5–5.3)
PROCALCITONIN SERPL-MCNC: 0.18 NG/ML — HIGH (ref 0.02–0.1)
PROT SERPL-MCNC: 7.7 G/DL — SIGNIFICANT CHANGE UP (ref 6.6–8.7)
RBC # BLD: 5.15 M/UL — SIGNIFICANT CHANGE UP (ref 3.8–5.2)
RBC # FLD: 13.5 % — SIGNIFICANT CHANGE UP (ref 10.3–14.5)
SARS-COV-2 RNA SPEC QL NAA+PROBE: DETECTED
SODIUM SERPL-SCNC: 145 MMOL/L — SIGNIFICANT CHANGE UP (ref 135–145)
TROPONIN T SERPL-MCNC: <0.01 NG/ML — SIGNIFICANT CHANGE UP (ref 0–0.06)
WBC # BLD: 5.45 K/UL — SIGNIFICANT CHANGE UP (ref 3.8–10.5)
WBC # FLD AUTO: 5.45 K/UL — SIGNIFICANT CHANGE UP (ref 3.8–10.5)

## 2020-04-27 PROCEDURE — 99284 EMERGENCY DEPT VISIT MOD MDM: CPT

## 2020-04-27 PROCEDURE — 70450 CT HEAD/BRAIN W/O DYE: CPT | Mod: 26

## 2020-04-27 PROCEDURE — 99223 1ST HOSP IP/OBS HIGH 75: CPT

## 2020-04-27 PROCEDURE — 71045 X-RAY EXAM CHEST 1 VIEW: CPT | Mod: 26

## 2020-04-27 PROCEDURE — 93010 ELECTROCARDIOGRAM REPORT: CPT

## 2020-04-27 RX ORDER — ENOXAPARIN SODIUM 100 MG/ML
40 INJECTION SUBCUTANEOUS
Refills: 0 | Status: DISCONTINUED | OUTPATIENT
Start: 2020-04-27 | End: 2020-04-27

## 2020-04-27 RX ORDER — ALBUTEROL 90 UG/1
2 AEROSOL, METERED ORAL EVERY 4 HOURS
Refills: 0 | Status: DISCONTINUED | OUTPATIENT
Start: 2020-04-27 | End: 2020-05-11

## 2020-04-27 RX ORDER — ATORVASTATIN CALCIUM 80 MG/1
10 TABLET, FILM COATED ORAL AT BEDTIME
Refills: 0 | Status: DISCONTINUED | OUTPATIENT
Start: 2020-04-27 | End: 2020-05-07

## 2020-04-27 RX ORDER — SODIUM CHLORIDE 9 MG/ML
500 INJECTION, SOLUTION INTRAVENOUS ONCE
Refills: 0 | Status: COMPLETED | OUTPATIENT
Start: 2020-04-27 | End: 2020-04-27

## 2020-04-27 RX ORDER — MEMANTINE HYDROCHLORIDE 10 MG/1
10 TABLET ORAL
Refills: 0 | Status: DISCONTINUED | OUTPATIENT
Start: 2020-04-27 | End: 2020-05-07

## 2020-04-27 RX ORDER — AMLODIPINE BESYLATE 2.5 MG/1
2.5 TABLET ORAL DAILY
Refills: 0 | Status: DISCONTINUED | OUTPATIENT
Start: 2020-04-27 | End: 2020-04-30

## 2020-04-27 RX ORDER — ASPIRIN/CALCIUM CARB/MAGNESIUM 324 MG
81 TABLET ORAL DAILY
Refills: 0 | Status: DISCONTINUED | OUTPATIENT
Start: 2020-04-27 | End: 2020-05-07

## 2020-04-27 RX ORDER — AMLODIPINE BESYLATE 2.5 MG/1
1 TABLET ORAL
Qty: 0 | Refills: 0 | DISCHARGE

## 2020-04-27 RX ORDER — ACETAMINOPHEN 500 MG
650 TABLET ORAL EVERY 4 HOURS
Refills: 0 | Status: DISCONTINUED | OUTPATIENT
Start: 2020-04-27 | End: 2020-04-29

## 2020-04-27 RX ORDER — ENOXAPARIN SODIUM 100 MG/ML
40 INJECTION SUBCUTANEOUS EVERY 12 HOURS
Refills: 0 | Status: DISCONTINUED | OUTPATIENT
Start: 2020-04-27 | End: 2020-05-07

## 2020-04-27 RX ORDER — ATENOLOL 25 MG/1
12.5 TABLET ORAL DAILY
Refills: 0 | Status: DISCONTINUED | OUTPATIENT
Start: 2020-04-27 | End: 2020-04-30

## 2020-04-27 RX ORDER — MEMANTINE HYDROCHLORIDE 10 MG/1
5 TABLET ORAL
Refills: 0 | Status: DISCONTINUED | OUTPATIENT
Start: 2020-04-27 | End: 2020-05-07

## 2020-04-27 RX ORDER — GUAIFENESIN/DEXTROMETHORPHAN 600MG-30MG
10 TABLET, EXTENDED RELEASE 12 HR ORAL EVERY 4 HOURS
Refills: 0 | Status: DISCONTINUED | OUTPATIENT
Start: 2020-04-27 | End: 2020-05-07

## 2020-04-27 RX ORDER — MEMANTINE HYDROCHLORIDE 10 MG/1
1 TABLET ORAL
Qty: 0 | Refills: 0 | DISCHARGE

## 2020-04-27 RX ORDER — ENOXAPARIN SODIUM 100 MG/ML
40 INJECTION SUBCUTANEOUS DAILY
Refills: 0 | Status: DISCONTINUED | OUTPATIENT
Start: 2020-04-27 | End: 2020-04-27

## 2020-04-27 RX ADMIN — ENOXAPARIN SODIUM 40 MILLIGRAM(S): 100 INJECTION SUBCUTANEOUS at 17:58

## 2020-04-27 RX ADMIN — SODIUM CHLORIDE 500 MILLILITER(S): 9 INJECTION, SOLUTION INTRAVENOUS at 11:41

## 2020-04-27 RX ADMIN — AMLODIPINE BESYLATE 2.5 MILLIGRAM(S): 2.5 TABLET ORAL at 16:36

## 2020-04-27 RX ADMIN — ATENOLOL 12.5 MILLIGRAM(S): 25 TABLET ORAL at 16:36

## 2020-04-27 RX ADMIN — ATORVASTATIN CALCIUM 10 MILLIGRAM(S): 80 TABLET, FILM COATED ORAL at 20:24

## 2020-04-27 RX ADMIN — Medication 81 MILLIGRAM(S): at 16:36

## 2020-04-27 RX ADMIN — MEMANTINE HYDROCHLORIDE 10 MILLIGRAM(S): 10 TABLET ORAL at 18:05

## 2020-04-27 NOTE — ED ADULT NURSE NOTE - CHIEF COMPLAINT QUOTE
Brought in by EMS c/o generalized weakness and SOB worsening this AM. Per EMS pt has 6+ roommates who are being treated for covid19. Pt with hx dementia, follows some commands, speaks words sometimes per EMS. RR even and unlabored, denies coughs denies fevers.

## 2020-04-27 NOTE — ED PROVIDER NOTE - OBJECTIVE STATEMENT
81 yo female pmh cva dementia, htn brought to ed with shortness of breath , ams and decreased po intake; pt  with positive exposure to mulitple roomates with COVOD-19 ; pt is normally verbal and able to feed herself; pt needed to be fed this morning and not answering questions;

## 2020-04-27 NOTE — H&P ADULT - ASSESSMENT
Patient is a 80yF PMH of CVA, dementia, HLD, HTN presented to the ED for evaluation of weakness, lethargy and decreased PO intake. Admitted for acute metabolic encephalopathy 2/2 COVID.     Acute Med Enc 2/2 viral infection 2/2 covid   -History of CVA and dementia  -Per facility, AMS and worsening functional status from baseline. (decreased PO intake, unable to ambulate, less verbal/responsive)  -CT head noted with chronic changes  -Passed bedside swallow eval- c/w Clear liquid diet   -f/u speech eval  -f/u PT eval    Episodic hypoxia 2/2 COVID  -desat to 85% in ER requiring supplimental O2  -currently satting 94% on room air at rest   -CXR showing small left infiltrate  - afebrile, no leukocytosis  -tylenol, tessalon pearls, robitussin prn  supplime    History of Dementia  c/w home dose  of namenda and elelon    Hypertension  -restarted home medications  -atenolol 12.5mg qd and amlodipine 2.5mg qd    Hyperlipidemia  c/w statin.     Prophylactic measure  DVT ppx-Lovenox 40 mg q daily. Patient is a 80yF PMH of CVA, dementia, HLD, HTN presented to the ED for evaluation of weakness, lethargy and decreased PO intake. Admitted for acute metabolic encephalopathy 2/2 COVID.     Acute Met Encephalopathy 2/2 viral infection 2/2 covid   -History of CVA and dementia, Baseline A/O x1 (self)  -Per facility, AMS and worsening functional status from baseline. (decreased PO intake, unable to ambulate, less verbal/responsive)  -CT head noted with chronic changes  -Passed bedside swallow eval- c/w Clear liquid diet   -f/u speech eval  -f/u PT eval    Episodic hypoxia 2/2 COVID  -desat to 85% in ER requiring supplimental O2  -currently satting 94% on room air at rest   -CXR showing small left infiltrate  - afebrile, no leukocytosis  -elevated inflammatory markers, trend  -lactate 2.1 likely due to hypoxia, sp 1L LR IVF in ED, restrict IVFs to prevent progression to ARDS, f/u repeat lactate  -tylenol, tessalon pearls, robitussin prn  - supplemental O2 as needed to maintain O2 sat>92%,  -encourage prone position for 15mins qhr, incentive spirometer    History of Dementia  c/w home dose  of Namenda   home med of Elelon non formulary, pt to provide    Hypertension  -restarted home medications  -atenolol 12.5mg qd and amlodipine 2.5mg qd    Hyperlipidemia  -c/w statin.     Prophylactic measure  DVT ppx-Lovenox 40 mg q daily. Patient is a 80yF PMH of CVA, dementia, HLD, HTN presented to the ED for evaluation of weakness, lethargy and decreased PO intake. Admitted for acute metabolic encephalopathy 2/2 COVID.     Acute Met Encephalopathy 2/2 viral infection 2/2 covid   -History of CVA and dementia, Baseline A/O x1 (self)  -Per facility, AMS and worsening functional status from baseline. (decreased PO intake, unable to ambulate, less verbal/responsive)  -CT head noted with chronic changes  -Passed bedside swallow eval- c/w Clear liquid diet   -f/u speech eval  -f/u PT eval  -palliative consulted    Episodic hypoxia 2/2 COVID  -desat to 85% in ER requiring supplimental O2  -currently satting 94% on room air at rest   -CXR showing small left infiltrate  - afebrile, no leukocytosis  -elevated inflammatory markers, trend  -lactate 2.1 likely due to hypoxia, sp 1L LR IVF in ED, restrict IVFs to prevent progression to ARDS, f/u repeat lactate  -tylenol, tessalon pearls, robitussin prn  - supplemental O2 as needed to maintain O2 sat>92%,  -encourage prone position for 15mins qhr, incentive spirometer    History of Dementia  c/w home dose  of Namenda   home med of Elelon non formulary, pt to provide    Hypertension  -restarted home medications  -atenolol 12.5mg qd and amlodipine 2.5mg qd    Hyperlipidemia  -c/w statin.     Prophylactic measure  DVT ppx-Lovenox 40 mg q daily. Patient is a 80yF PMH of CVA, dementia, HLD, HTN presented to the ED for evaluation of weakness, lethargy and decreased PO intake. Admitted for acute metabolic encephalopathy 2/2 COVID.     #Acute Met Encephalopathy 2/2 viral infection 2/2 covid   - History of CVA and dementia, Baseline A/O x1 (self)  - Per facility, AMS and worsening functional status from baseline. (decreased PO intake, unable to ambulate, less verbal/responsive)  - CT head noted with chronic changes  - Passed bedside swallow eval- c/w Clear liquid diet   - f/u speech eval  - f/u PT eval  - palliative consulted    #Episodic hypoxia 2/2 COVID  - desat to 85% in ER requiring supplimental O2  - currently satting 94% on room air at rest   - CXR showing small left infiltrate  - afebrile, no leukocytosis  - elevated inflammatory markers, trend  - lactate 2.1 likely due to hypoxia, sp 1L LR IVF in ED, restrict IVFs to prevent progression to ARDS, f/u repeat lactate  - tylenol, tessalon pearls, robitussin prn  - supplemental O2 as needed to maintain O2 sat>92%,  - encourage prone position for 15mins qhr, incentive spirometer    #History of Dementia  - Namenda   - home med of Elelon non formulary, family to provide    #Hypertension  - atenolol amlodipine   - monitor blood pressure     #Hyperlipidemia  - statin.     # DVT Prophylactic   - lovenox SC   IMPROVE VTE Individual Risk Assessment  RISK                                                                Points  [  ] Previous VTE                                                  3  [  ] Thrombophilia                                               2  [  ] Lower limb paralysis                                      2        (unable to hold up >15 seconds)    [  ] Current Cancer                                              2         (within 6 months)  [x  ] Immobilization > 24 hrs                                1  [  ] ICU/CCU stay > 24 hours                              1  [x  ] Age > 60                                                      1    IMPROVE VTE Score ____2_____  IMPROVE Score 0-1: Low Risk, No VTE prophylaxis required for most patients, encourage ambulation.   IMPROVE Score 2-3: At risk, pharmacologic VTE prophylaxis is indicated for most patients (in the absence of a contraindication)  IMPROVE Score > or = 4: High Risk, pharmacologic VTE prophylaxis is indicated for most patients (in the absence of a contraindication)

## 2020-04-27 NOTE — H&P ADULT - HISTORY OF PRESENT ILLNESS
Patient is a 80yF PMH of CVA, dementia, HLD, HTN presented to the ED for evaluation of weakness, lethargy and decreased PO intake. Unable to obtain history from patient as is poor historian, currently minimally responsive due to lethargy.  Baseline AAOx1 (self).  Per care manager at [facility], for past 24 hrs they noted pt has been increasing lethargic and needed extra assistance with ADL's. Normally requires 1 person to assist but currently required 3 person assist. Normally has good appetite, now not eating or drinking, requiring 1:1 assistance with feeds. Pt also normally verbal but has had AMS and minimally responsive for past 1-2 days. Pt with positive exposure to mulitple roomates with COVOD-19. Pt at bedside states she feels weak and has mild SOB.

## 2020-04-27 NOTE — ED ADULT NURSE NOTE - INTERVENTIONS DEFINITIONS
Call bell, personal items and telephone within reach/Instruct patient to call for assistance/[place] to call system/Provide visual cue, wrist band, yellow gown, etc./Provide visual clues: red socks

## 2020-04-27 NOTE — ED ADULT NURSE REASSESSMENT NOTE - NS ED NURSE REASSESS COMMENT FT1
Pt now alert and oriented to person, place.  More responsive than at time of arrival.  Pt offering no complaints at this time.  Will continue to monitor and reassess.

## 2020-04-27 NOTE — ED ADULT TRIAGE NOTE - CHIEF COMPLAINT QUOTE
Brought in by EMS c/o generalized weakness and SOB worsening this AM. Per EMS pt has 6+ roommates who are being treated for covid19. Pt with hx dementia, follows some commands, speaks words sometimes per EMS. RR even and unlabored, denies coughs denies fevers Brought in by EMS c/o generalized weakness and SOB worsening this AM. Per EMS pt has 6+ roommates who are being treated for covid19. Pt with hx dementia, follows some commands, speaks words sometimes per EMS. RR even and unlabored, denies coughs denies fevers.

## 2020-04-27 NOTE — ED ADULT NURSE NOTE - PAIN: PRESENCE, MLM
Urology Progress Note  [facility]    Provider: Inna Ruano MD Patient ID:  Admission Date: 5/3/2019 Name: Manuel Doyle  OR Date: 5/3/2019 MRN: [id number]   Patient Location: [id number] : 1957  Attending: Bryan Thrasher MD Date of Service: 2019  PCP: Fermin Edwards MD     Diagnoses:  Right distal ureteral stone    Assessment/Plan:  S/p R URS by Dr. Abdullahi Wade 5/3/2019  - asx this am, much improved pain control  - AFVSS  - with UTI with Klebsiella/Enterococcus in BCx - cautioned on risks associated with bacteremia  - Pt adamanat on going home this am    Recommendations:  - Abx per cx results - pt is high risk given recent procedure  - Return precautions emphasized to pt  - Urology f/u requested    The patient had a chance to ask questions which were answered. he understands the above plan. Subjective:   Manuel Doyle is a 60 y.o. male. He was seen and examined this morning. Today we discussed stone surgery and cx findings. No pain. Jeaneen Agusks to go home. BCx as above    Objective:   Vitals:  Vitals:    19 0915   BP: 123/86   Pulse: 85   Resp: 16   Temp: 97.5 °F (36.4 °C)   SpO2: 96%       Intake/Output Summary (Last 24 hours) at 2019 0957  Last data filed at 2019 0507  Gross per 24 hour   Intake 3428 ml   Output 1350 ml   Net 2078 ml     Physical Exam:  Gen: Alert and oriented x3, no acute distress  CV: Regular rate   Resp: unlabored respirations  Abd: Soft, non-distended, non-tender, no masses  Ext: no peripheral edema noted, moves upper and lower extremities spontaneously  Skin: warmand well perfused, no rashes noted on the face, or arms.      Labs:  Lab Results   Component Value Date    WBC 15.9 (H) 2019    HGB 12.9 (L) 2019    HCT 40.9 2019    MCV 86.8 2019     2019     Lab Results   Component Value Date    CREATININE 1.3 2019    BUN 29 (H) 2019     2019    K 4.6 2019     05/03/2019    CO2 26 05/03/2019       Genesis Frank MD  5/4/2019 non-verbal indicators of pain/discomfort absent

## 2020-04-27 NOTE — H&P ADULT - NSHPPHYSICALEXAM_GEN_ALL_CORE
General: NAD, awake alert pleasant female, lethargic   HEENT: atraumatic, normocephalic, PERRLA, EOMI, Mucous membranes are moist. Nasal cannula in place  NECK: Supple  CVR: Regular rate and rhythm, Normal s1, s2 , no murmurs, rubs or gallops.  LUNG: Fair air entry bilaterally. No wheezing, crackles, ronchi.  ABDOMEN: + Bowel Sounds x4, soft nondistended, not tender to palpation, no garding or rigidity.  EXT: 2+ Peripheral Pulses, No clubbing, cyanosis, or edema  SKIN: Intact, Warm, Dry General: NAD, awake alert pleasant female, lethargic   HEENT: atraumatic, normocephalic, PERRLA, EOMI, Mucous membranes are moist. Nasal cannula in place  NECK: Supple  CVR: Regular rate and rhythm, Normal s1, s2 , no murmurs, rubs or gallops.  LUNG: Fair air entry bilaterally. No wheezing, crackles, ronchi.  ABDOMEN: + Bowel Sounds x4, soft distended.  EXT: 2+ Peripheral Pulses, No clubbing, cyanosis, or edema  SKIN: Intact, Warm, Dry Vital Signs Last 24 Hrs  T(F): 98.2 (27 Apr 2020 15:54), Max: 98.7 (27 Apr 2020 09:40)  HR: 66 (27 Apr 2020 15:54) (66 - 80)  BP: 144/72 (27 Apr 2020 15:54) (143/89 - 144/72)  RR: 16 (27 Apr 2020 15:54) (16 - 18)  SpO2: 97% (27 Apr 2020 15:54) (85% - 98%)    Physical Exam:  General: NAD, lethargic   HEENT: atraumatic, normocephalic, PERRLA, EOMI, Mucous membranes are moist. Nasal cannula in place  NECK: Supple  CVR: +s1/s2  LUNG: Fair air entry bilaterally. No wheezing, crackles, rhonchi.  ABDOMEN: + Bowel Sounds x4, soft distended.  EXT: 2+ Peripheral Pulses, No clubbing, cyanosis, or edema  SKIN: Intact, Warm, Dry  : Contraindicated  Breasts: Contraindicated  Rectal: Contraindicated

## 2020-04-27 NOTE — ED ADULT NURSE NOTE - OBJECTIVE STATEMENT
Pt awake, alert, oriented to person, disoriented to time, situation.  As per EMS and paperwork sent in with patient, pt sent in for pulse ox 89/90%, SOB and weakness.  Respirations unlabored.  o2 saturation 94% room air upon initial assessment.

## 2020-04-27 NOTE — H&P ADULT - NSHPSOCIALHISTORY_GEN_ALL_CORE
Lives at the [facility] facility. [facility] Lives at home with family    Denies tobacco use  Denies illicit drug use  Denies etoh use

## 2020-04-27 NOTE — H&P ADULT - NSHPLABSRESULTS_GEN_ALL_CORE
15.8   5.45  )-----------( 188      ( 27 Apr 2020 10:34 )             49.2     04-27    145  |  104  |  22.0<H>  ----------------------------<  102<H>  3.8   |  25.0  |  0.84    Ca    9.2      27 Apr 2020 10:34    TPro  7.7  /  Alb  4.1  /  TBili  0.7  /  DBili  x   /  AST  35<H>  /  ALT  24  /  AlkPhos  131<H>  04-27    LIVER FUNCTIONS - ( 27 Apr 2020 10:34 )  Alb: 4.1 g/dL / Pro: 7.7 g/dL / ALK PHOS: 131 U/L / ALT: 24 U/L / AST: 35 U/L / GGT: x 15.8   5.45  )-----------( 188      ( 27 Apr 2020 10:34 )             49.2     04-27    145  |  104  |  22.0<H>  ----------------------------<  102<H>  3.8   |  25.0  |  0.84    Ca    9.2      27 Apr 2020 10:34    TPro  7.7  /  Alb  4.1  /  TBili  0.7  /  DBili  x   /  AST  35<H>  /  ALT  24  /  AlkPhos  131<H>  04-27    LIVER FUNCTIONS - ( 27 Apr 2020 10:34 )  Alb: 4.1 g/dL / Pro: 7.7 g/dL / ALK PHOS: 131 U/L / ALT: 24 U/L / AST: 35 U/L / GGT: x    < from: CT Head No Cont (04.27.20 @ 11:56) >    FINDINGS:  There is periventricular and subcortical white matter hypodensity without mass effect, nonspecific, likely representing severe chronic microvascular ischemic changes. There is no compelling evidence for an acute transcortical infarction. There is no evidence of mass, mass effect, midline shift or extra-axial fluid collection. The lateral ventricles and cortical sulci are age-appropriate in size and configuration. The orbits, mastoid air cells and visualized paranasal sinuses are unremarkable. The calvarium is intact.    IMPRESSION:  Severe chronic microvascular changes without evidence of an acute transcortical infarction or hemorrhage. MR is a more sensitive imaging modality for the evaluation of an acute infarction.     < end of copied text >

## 2020-04-28 LAB
APPEARANCE UR: CLEAR — SIGNIFICANT CHANGE UP
BILIRUB UR-MCNC: NEGATIVE — SIGNIFICANT CHANGE UP
COLOR SPEC: YELLOW — SIGNIFICANT CHANGE UP
DIFF PNL FLD: ABNORMAL
EPI CELLS # UR: SIGNIFICANT CHANGE UP
GLUCOSE UR QL: NEGATIVE MG/DL — SIGNIFICANT CHANGE UP
KETONES UR-MCNC: ABNORMAL
LEUKOCYTE ESTERASE UR-ACNC: NEGATIVE — SIGNIFICANT CHANGE UP
NITRITE UR-MCNC: NEGATIVE — SIGNIFICANT CHANGE UP
PH UR: 6.5 — SIGNIFICANT CHANGE UP (ref 5–8)
PROT UR-MCNC: 100 MG/DL
RBC CASTS # UR COMP ASSIST: ABNORMAL /HPF (ref 0–4)
SP GR SPEC: 1.01 — SIGNIFICANT CHANGE UP (ref 1.01–1.02)
UROBILINOGEN FLD QL: 4 MG/DL
WBC UR QL: NEGATIVE — SIGNIFICANT CHANGE UP

## 2020-04-28 PROCEDURE — 99233 SBSQ HOSP IP/OBS HIGH 50: CPT

## 2020-04-28 PROCEDURE — 99497 ADVNCD CARE PLAN 30 MIN: CPT | Mod: 25

## 2020-04-28 PROCEDURE — 99358 PROLONG SERVICE W/O CONTACT: CPT

## 2020-04-28 PROCEDURE — 99223 1ST HOSP IP/OBS HIGH 75: CPT

## 2020-04-28 RX ORDER — RISPERIDONE 4 MG/1
0.25 TABLET ORAL EVERY 12 HOURS
Refills: 0 | Status: DISCONTINUED | OUTPATIENT
Start: 2020-04-28 | End: 2020-04-30

## 2020-04-28 RX ADMIN — ENOXAPARIN SODIUM 40 MILLIGRAM(S): 100 INJECTION SUBCUTANEOUS at 05:27

## 2020-04-28 RX ADMIN — MEMANTINE HYDROCHLORIDE 10 MILLIGRAM(S): 10 TABLET ORAL at 17:46

## 2020-04-28 RX ADMIN — Medication 81 MILLIGRAM(S): at 10:35

## 2020-04-28 RX ADMIN — MEMANTINE HYDROCHLORIDE 5 MILLIGRAM(S): 10 TABLET ORAL at 10:35

## 2020-04-28 RX ADMIN — Medication 650 MILLIGRAM(S): at 17:47

## 2020-04-28 RX ADMIN — AMLODIPINE BESYLATE 2.5 MILLIGRAM(S): 2.5 TABLET ORAL at 05:25

## 2020-04-28 RX ADMIN — ENOXAPARIN SODIUM 40 MILLIGRAM(S): 100 INJECTION SUBCUTANEOUS at 17:48

## 2020-04-28 RX ADMIN — RISPERIDONE 0.25 MILLIGRAM(S): 4 TABLET ORAL at 21:25

## 2020-04-28 RX ADMIN — ATORVASTATIN CALCIUM 10 MILLIGRAM(S): 80 TABLET, FILM COATED ORAL at 21:25

## 2020-04-28 RX ADMIN — ATENOLOL 12.5 MILLIGRAM(S): 25 TABLET ORAL at 05:27

## 2020-04-28 NOTE — PROGRESS NOTE ADULT - SUBJECTIVE AND OBJECTIVE BOX
INTERVAL HPI/OVERNIGHT EVENTS:  Pt seen and evaluated.  Pt opens eyes to stimuli, but non-verbal.  Satting 95% on 2L NC.    MEDICATIONS  (STANDING):  amLODIPine   Tablet 2.5 milliGRAM(s) Oral daily  aspirin enteric coated 81 milliGRAM(s) Oral daily  ATENolol  Tablet 12.5 milliGRAM(s) Oral daily  atorvastatin 10 milliGRAM(s) Oral at bedtime  enoxaparin Injectable 40 milliGRAM(s) SubCutaneous every 12 hours  memantine 10 milliGRAM(s) Oral <User Schedule>  memantine 5 milliGRAM(s) Oral <User Schedule>    MEDICATIONS  (PRN):  acetaminophen   Tablet .. 650 milliGRAM(s) Oral every 4 hours PRN Temp greater or equal to 38.5C (101.3F)  acetaminophen  Suppository .. 650 milliGRAM(s) Rectal every 4 hours PRN Temp greater or equal to 38.5C (101.3F)  ALBUTerol    90 MICROgram(s) HFA Inhaler 2 Puff(s) Inhalation every 4 hours PRN Shortness of Breath and/or Wheezing  benzonatate 100 milliGRAM(s) Oral three times a day PRN Cough  guaifenesin/dextromethorphan  Syrup 10 milliLiter(s) Oral every 4 hours PRN Cough      Allergies    No Known Allergies        Vital Signs Last 24 Hrs  T(C): 37 (28 Apr 2020 08:26), Max: 37 (27 Apr 2020 17:52)  T(F): 98.6 (28 Apr 2020 08:26), Max: 98.6 (27 Apr 2020 17:52)  HR: 66 (28 Apr 2020 08:26) (62 - 67)  BP: 129/95 (28 Apr 2020 08:26) (114/71 - 160/92)  BP(mean): --  RR: 20 (28 Apr 2020 08:26) (16 - 20)  SpO2: 95% (28 Apr 2020 08:26) (95% - 98%)      LABS:                        15.8   5.45  )-----------( 188      ( 27 Apr 2020 10:34 )             49.2     04-27    145  |  104  |  22.0<H>  ----------------------------<  102<H>  3.8   |  25.0  |  0.84    Ca    9.2      27 Apr 2020 10:34    TPro  7.7  /  Alb  4.1  /  TBili  0.7  /  DBili  x   /  AST  35<H>  /  ALT  24  /  AlkPhos  131<H>  04-27    COVID-19 PCR . (04.27.20 @ 10:01)    COVID-19 PCR: Detected: TYPE:(C=Critical, N=Notification, A=Abnormal) C  TESTS: Covid  DATE/TIME CALLED: 04/27/20 11:11  CALLED TO: YOUNG Saldaña  READ BACK (2 Patient Identifiers)(Y/N): Y  READ BACK VALUES (Y/N): Y  CALLED BY: [email or web address]  This test has been validated by [employer] to be accurate;  though it has not been FDA cleared/approved by the usual pathway  As with all laboratory test, results should be correlated with clinical  findings.  https://www.fda.gov/media/066487/download  https://www.fda.gov/media/318365/download        C-Reactive Protein, Serum (04.27.20 @ 10:34)    C-Reactive Protein, Serum: 4.81 mg/dL    Ferritin, Serum (04.27.20 @ 10:34)    Ferritin, Serum: 610 ng/mL    D-Dimer Assay, Quantitative (04.27.20 @ 10:34)    D-Dimer Assay, Quantitative: 355 ng/mL DDU    Procalcitonin, Serum (04.27.20 @ 10:34)    Procalcitonin, Serum: 0.18: Hemolyzed. Interpret [facility] caution  Reference range change as of 3/21/2019 ng/mL        Troponin T, Serum (04.27.20 @ 10:34)    Troponin T, Serum: <0.01: Reference Interval for Troponin T  Less than 0.06 ng/mL - includes the 99th percentile of a healthy  population at a method C.V. of 10% or less.  NOTE: Troponin T is measured by the Roche CLIA method and these  results are not interchangable with Troponin I results since they are  different assays with different reference intervals. ng/mL    RADIOLOGY & ADDITIONAL TESTS:    < from: CT Head No Cont (04.27.20 @ 11:56) >   EXAM:  CT BRAIN                          PROCEDURE DATE:  04/27/2020          INTERPRETATION:  CLINICAL Indications:  ams    COMPARISON: CT head dated 5/13/2019    TECHNIQUE: Noncontrast CT of the head. Multiplanar reformations are submitted.    FINDINGS:  There is periventricular and subcortical white matter hypodensity without mass effect, nonspecific, likely representing severe chronic microvascular ischemic changes. There is no compelling evidence for an acute transcortical infarction. There is no evidence of mass, mass effect, midline shift or extra-axial fluid collection. The lateral ventricles and cortical sulci are age-appropriate in size and configuration. The orbits, mastoid air cells and visualized paranasal sinuses are unremarkable. The calvarium is intact.    IMPRESSION:  Severe chronic microvascular changes without evidence of an acute transcortical infarction or hemorrhage. MR is a more sensitive imaging modality for the evaluation of an acute infarction.     < end of copied text >

## 2020-04-28 NOTE — CONSULT NOTE ADULT - ATTENDING COMMENTS
COUNSELING:    Face to face meeting to discuss Advanced Care Planning - Time Spent ______ Minutes.  See goals of care note.    More than 50% time spent in counseling and coordinating care. ______ Minutes.     Thank you for the opportunity to assist with the care of this patient.   [facility] [phone]. COUNSELING:    Face to face meeting to discuss Advanced Care Planning - Time Spent _25_____ Minutes.  See goals of care note.    More than 50% time spent in counseling and coordinating care. ___30___ Minutes.     Thank you for the opportunity to assist with the care of this patient.   [facility] Consult [facility] [phone].

## 2020-04-28 NOTE — CONSULT NOTE ADULT - ASSESSMENT
79 yo F  Resident of Reflections Unit at [facility] in [place] admitted with SOB and hypoxia  +COVID 19 , and Altered Mental Status    COVID + 19 PNA    Left Lung Infiltrate  Inflammatory Marker elevated-Trend  Lactate> COVID Hypoxia  IV hydration  Clear Liquid diet-passed swallow NPO for food  Albuterol HHA    COUGH  Tessalon Perles-cough  Robittusin  Tylenol    Delirium-Hypoactive with Underlying Dementia  Safety Risk  Avoid sedating medications  Continue Namenda and Elelon (non-formulary) will bring in for her use  Recommend Risperidone 0.25mg BID for Sundowning or agitation becomes a problem  Weakness-has been confined to WC at [facility]  PT Eduard requested    [facility] DNR-DNI Reconfirmed  I called  Maurisio Henry   Contact info: (H) [phone]  He prefers we use his cell [phone]  See [facility] documentation 79 yo F  Resident of Reflections Unit at [facility] in [place] admitted with SOB and hypoxia  +COVID 19 , and Altered Mental Status    COVID + 19 PNA    Left Lung Infiltrate  Titrate O2 as needed Now on 2l NC SPO2 95%  Inflammatory Marker elevated-Trend  Lactate> COVID Hypoxia  IV hydration  Clear Liquid diet-passed swallow NPO for food  Albuterol HHA    COUGH  Tessalon Perles-cough  Robittusin  Tylenol    Delirium-Hypoactive with Underlying Dementia  Safety Risk  Avoid sedating medications  Continue Namenda and Elelon (non-formulary) will bring in for her use  Recommend Risperidone 0.25mg BID for Sundowning or agitation becomes a problem  Weakness-has been confined to WC at [facility]  PT Eval requested    [facility] DNR-DNI Reconfirmed  I called  Maurisio Henry   Contact info: (H) [phone]  He prefers we use his cell [phone]  See [facility] documentation

## 2020-04-28 NOTE — PROGRESS NOTE ADULT - ASSESSMENT
Patient is a 80yF PMH of CVA, dementia, HLD, HTN presented to the ED for evaluation of weakness, lethargy and decreased PO intake. Admitted for acute metabolic encephalopathy 2/2 COVID.     #Acute Met Encephalopathy 2/2 viral infection 2/2 covid   - History of CVA and dementia, Baseline A/O x1 (self)  - Per facility, AMS and worsening functional status from baseline. (decreased PO intake, unable to ambulate, less verbal/responsive)  - CT head noted with chronic changes / no acute changes.  - Passed bedside swallow eval- c/w Clear liquid diet   - f/u speech eval  - f/u PT eval  - palliative consulted    #Episodic hypoxia 2/2 COVID - desatted to 85% in ER requiring supplimental O2, though currently satting well on room air.  - currently satting 94% on room air at rest   - CXR showing small left infiltrate  - afebrile, no leukocytosis  - elevated inflammatory markers, trend  - lactate 2.1 likely due to hypoxia, sp 1L LR IVF in ED, restrict IVFs to prevent progression to ARDS, f/u repeat lactate  - tylenol, tessalon pearls, robitussin prn  - supplemental O2 as needed to maintain O2 sat>92%,  - encourage prone position for 15mins qhr, incentive spirometer    #History of Dementia  - Namenda   - home med of [place] non formulary, family to provide    #Hypertension  - Continue atenolol, amlodipine   - monitor blood pressure     #Hyperlipidemia  - Continue atorvastatin.     # DVT Prophylactic   - Lovenox SC

## 2020-04-28 NOTE — CONSULT NOTE ADULT - SUBJECTIVE AND OBJECTIVE BOX
This is an 81 yo F PMH of CVA, Dementia, HTN presented from [facility] with increasing weakness and lethargy, not eating or drinking; very lethargic and non-verbal. Mild SOB was hypoxic in ED  and needed O2 via NC.    HPI:  Patient is a 80yF PMH of CVA, dementia, HLD, HTN presented to the ED for evaluation of weakness, lethargy and decreased PO intake. Unable to obtain history from patient as is poor historian, currently minimally responsive due to lethargy.  Baseline AAOx1 (self).  Per care manager at [facility], for past 24 hrs they noted pt has been increasing lethargic and needed extra assistance with ADL's. Normally requires 1 person to assist but currently required 3 person assist. Normally has good appetite, now not eating or drinking, requiring 1:1 assistance with feeds. Pt also normally verbal but has had AMS and minimally responsive for past 1-2 days. Pt with positive exposure to mulitple roomates with COVOD-19. Pt at bedside states she feels weak and has mild SOB. (27 Apr 2020 13:26)      PERTINENT PMH REVIEWED: Yes   PAST MEDICAL & SURGICAL HISTORY:  CVA (cerebral vascular accident)  Dementia  Hypertension  S/P colonoscopy with polypectomy  S/P inguinal hernia repair: Bilateral      SOCIAL HISTORY:  EtOH   No                                    Drugs   No                                     nonsmoker                                    Admitted from: [facility] ___/HCP-  Maurisio Henry  [phone]    FAMILY HISTORY:  Family history of abdominal aortic aneurysm  Family history of stroke: Father    No Known Allergies    Baseline ADLs (prior to admission):   Dependent      Present Symptoms:   Dyspnea:  1 2    Nausea/Vomiting:  No  Anxiety:  Yes   Depression: ??  Fatigue: Yes Lethargic  Loss of appetite: Yes     Pain:             Character-            Duration-            Effect-            Factors-            Frequency-            Location-            Severity-    Review of Systems: Reviewed                                      Unable to obtain due to poor mentation       MEDICATIONS  (STANDING):  amLODIPine   Tablet 2.5 milliGRAM(s) Oral daily  aspirin enteric coated 81 milliGRAM(s) Oral daily  ATENolol  Tablet 12.5 milliGRAM(s) Oral daily  atorvastatin 10 milliGRAM(s) Oral at bedtime  enoxaparin Injectable 40 milliGRAM(s) SubCutaneous every 12 hours  memantine 10 milliGRAM(s) Oral <User Schedule>  memantine 5 milliGRAM(s) Oral <User Schedule>    MEDICATIONS  (PRN):  acetaminophen   Tablet .. 650 milliGRAM(s) Oral every 4 hours PRN Temp greater or equal to 38.5C (101.3F)  acetaminophen  Suppository .. 650 milliGRAM(s) Rectal every 4 hours PRN Temp greater or equal to 38.5C (101.3F)  ALBUTerol    90 MICROgram(s) HFA Inhaler 2 Puff(s) Inhalation every 4 hours PRN Shortness of Breath and/or Wheezing  benzonatate 100 milliGRAM(s) Oral three times a day PRN Cough  guaifenesin/dextromethorphan  Syrup 10 milliLiter(s) Oral every 4 hours PRN Cough      PHYSICAL EXAM: Due to the nature of this patient's COVID-19 isolation status, no bedside physical exam was done to limit spread of infection. Examination highlights were provided by bedside nurse and primary team. Objective data were reviewed in detail.    LABS:                        15.8   5.45  )-----------( 188      ( 27 Apr 2020 10:34 )             49.2     04-27    145  |  104  |  22.0<H>  ----------------------------<  102<H>  3.8   |  25.0  |  0.84    Ca    9.2      27 Apr 2020 10:34    TPro  7.7  /  Alb  4.1  /  TBili  0.7  /  DBili  x   /  AST  35<H>  /  ALT  24  /  AlkPhos  131<H>  04-27    I&O's Summary    27 Apr 2020 07:01  -  28 Apr 2020 07:00  --------------------------------------------------------  IN: 240 mL / OUT: 0 mL / NET: 240 mL    RADIOLOGY & ADDITIONAL STUDIES:  < from: Xray Chest 1 View-PORTABLE IMMEDIATE (04.27.20 @ 10:49) >   EXAM:  XR CHEST PORTABLE IMMED 1V                          PROCEDURE DATE:  04/27/2020          INTERPRETATION:  Chest one view    HISTORY: Shortness of breath, cough and fever    COMPARISON STUDY: 5/16/2019    Frontal expiratory view of the chestshows the heart to be slightly enlarged in size. The lungs show small infiltrate along the left heart border and there is no evidence of pneumothorax nor pleural effusion.    IMPRESSION:  Small left infiltrate.    Thank you for the courtesy of this referral.    ADVANCE DIRECTIVES:   DNR YES   Completed on:                     MOLST  YES NO   Completed on:  Living Will  NO   Completed on: This is an 81 yo F PMH of CVA ,hemorrhagic stroke, Vascular  Dementia, HTN presented from [facility]  with increasing weakness and lethargy, not eating or drinking; very lethargic and non-verbal. Mild SOB was hypoxic in ED and needed O2 via NC. Patient is afebrile today, still very lethargic but will open her eyes and turn to you when calling het name. Patient was speaking, wheelchair  bound and fairly happy and engaging while at [facility]. She is tolerating O2 2L NC Able to drink liquids today. No BM has been charted. No history of or signs of Pain behaviors observed  CC: Metabolic Encephalopathy + COVID 19    HPI:  Patient is a 80yF PMH of CVA, dementia, HLD, HTN presented to the ED for evaluation of weakness, lethargy and decreased PO intake. Unable to obtain history from patient as is poor historian, currently minimally responsive due to lethargy.  Baseline AAOx1 (self).  Per care manager at [facility], for past 24 hrs they noted pt has been increasing lethargic and needed extra assistance with ADL's. Normally requires 1 person to assist but currently required 3 person assist. Normally has good appetite, now not eating or drinking, requiring 1:1 assistance with feeds. Pt also normally verbal but has had AMS and minimally responsive for past 1-2 days. Pt with positive exposure to mulitple roomates with COVOD-19. Pt at bedside states she feels weak and has mild SOB. (27 Apr 2020 13:26)      PERTINENT PMH REVIEWED: Yes   PAST MEDICAL & SURGICAL HISTORY:  CVA (cerebral vascular accident)  Dementia  Hypertension  S/P colonoscopy with polypectomy  S/P inguinal hernia repair: Bilateral      SOCIAL HISTORY:  EtOH   No                                    Drugs   No                                   nonsmoker                                   [facility]CP-  Maurisio Henry  [phone]    FAMILY HISTORY:  Family history of abdominal aortic aneurysm  Family history of stroke: Father    No Known Allergies    Baseline ADLs (prior to admission):   Dependent      Present Symptoms:   Dyspnea:  1   Nausea/Vomiting:  No  Anxiety:  Yes   Depression: ??  Fatigue: Yes Lethargic  Loss of appetite: Yes     Pain: no pain behaviors observed            Character-            Duration-            Effect-            Factors-            Frequency-            Location-            Severity-    Review of Systems: Reviewed                                      Unable to obtain due to poor mentation       MEDICATIONS  (STANDING):  amLODIPine   Tablet 2.5 milliGRAM(s) Oral daily  aspirin enteric coated 81 milliGRAM(s) Oral daily  ATENolol  Tablet 12.5 milliGRAM(s) Oral daily  atorvastatin 10 milliGRAM(s) Oral at bedtime  enoxaparin Injectable 40 milliGRAM(s) SubCutaneous every 12 hours  memantine 10 milliGRAM(s) Oral <User Schedule>  memantine 5 milliGRAM(s) Oral <User Schedule>    MEDICATIONS  (PRN):  acetaminophen   Tablet .. 650 milliGRAM(s) Oral every 4 hours PRN Temp greater or equal to 38.5C (101.3F)  acetaminophen  Suppository .. 650 milliGRAM(s) Rectal every 4 hours PRN Temp greater or equal to 38.5C (101.3F)  ALBUTerol    90 MICROgram(s) HFA Inhaler 2 Puff(s) Inhalation every 4 hours PRN Shortness of Breath and/or Wheezing  benzonatate 100 milliGRAM(s) Oral three times a day PRN Cough  guaifenesin/dextromethorphan  Syrup 10 milliLiter(s) Oral every 4 hours PRN Cough      PHYSICAL EXAM: Due to the nature of this patient's COVID-19 isolation status, no bedside physical exam was done to limit spread of infection. Examination highlights were provided by bedside nurse and primary team. Objective data were reviewed in detail.    LABS:                        15.8   5.45  )-----------( 188      ( 27 Apr 2020 10:34 )             49.2     04-27    145  |  104  |  22.0<H>  ----------------------------<  102<H>  3.8   |  25.0  |  0.84    Ca    9.2      27 Apr 2020 10:34    TPro  7.7  /  Alb  4.1  /  TBili  0.7  /  DBili  x   /  AST  35<H>  /  ALT  24  /  AlkPhos  131<H>  04-27    I&O's Summary    27 Apr 2020 07:01  -  28 Apr 2020 07:00  --------------------------------------------------------  IN: 240 mL / OUT: 0 mL / NET: 240 mL    RADIOLOGY & ADDITIONAL STUDIES:  < from: Xray Chest 1 View-PORTABLE IMMEDIATE (04.27.20 @ 10:49) >   EXAM:  XR CHEST PORTABLE IMMED 1V                          PROCEDURE DATE:  04/27/2020          INTERPRETATION:  Chest one view    HISTORY: Shortness of breath, cough and fever    COMPARISON STUDY: 5/16/2019    Frontal expiratory view of the chest shows the heart to be slightly enlarged in size. The lungs show small infiltrate along the left heart border and there is no evidence of pneumothorax nor pleural effusion.    IMPRESSION:  Small left infiltrate.    Thank you for the courtesy of this referral.     ADVANCE DIRECTIVES:   DNR YES   Completed in [facility] will contact Sandra ([facility])-acquire a copy of their Directive so that we can update-have a copy for the chart                   MOLST     Completed on:  Living Will  NO   Completed on:

## 2020-04-29 LAB
ALBUMIN SERPL ELPH-MCNC: 3.8 G/DL — SIGNIFICANT CHANGE UP (ref 3.3–5.2)
ALP SERPL-CCNC: 121 U/L — HIGH (ref 40–120)
ALT FLD-CCNC: 28 U/L — SIGNIFICANT CHANGE UP
ANION GAP SERPL CALC-SCNC: 18 MMOL/L — HIGH (ref 5–17)
ANISOCYTOSIS BLD QL: SLIGHT — SIGNIFICANT CHANGE UP
AST SERPL-CCNC: 49 U/L — HIGH
BASOPHILS # BLD AUTO: 0 K/UL — SIGNIFICANT CHANGE UP (ref 0–0.2)
BASOPHILS NFR BLD AUTO: 0 % — SIGNIFICANT CHANGE UP (ref 0–2)
BILIRUB SERPL-MCNC: 0.7 MG/DL — SIGNIFICANT CHANGE UP (ref 0.4–2)
BUN SERPL-MCNC: 20 MG/DL — SIGNIFICANT CHANGE UP (ref 8–20)
CALCIUM SERPL-MCNC: 9.1 MG/DL — SIGNIFICANT CHANGE UP (ref 8.6–10.2)
CHLORIDE SERPL-SCNC: 104 MMOL/L — SIGNIFICANT CHANGE UP (ref 98–107)
CO2 SERPL-SCNC: 23 MMOL/L — SIGNIFICANT CHANGE UP (ref 22–29)
CREAT SERPL-MCNC: 0.77 MG/DL — SIGNIFICANT CHANGE UP (ref 0.5–1.3)
CRP SERPL-MCNC: 9.44 MG/DL — HIGH (ref 0–0.4)
D DIMER BLD IA.RAPID-MCNC: 276 NG/ML DDU — HIGH
ELLIPTOCYTES BLD QL SMEAR: SLIGHT — SIGNIFICANT CHANGE UP
EOSINOPHIL # BLD AUTO: 0 K/UL — SIGNIFICANT CHANGE UP (ref 0–0.5)
EOSINOPHIL NFR BLD AUTO: 0 % — SIGNIFICANT CHANGE UP (ref 0–6)
FERRITIN SERPL-MCNC: 1207 NG/ML — HIGH (ref 15–150)
GIANT PLATELETS BLD QL SMEAR: PRESENT — SIGNIFICANT CHANGE UP
GLUCOSE SERPL-MCNC: 100 MG/DL — HIGH (ref 70–99)
HCT VFR BLD CALC: 51 % — HIGH (ref 34.5–45)
HGB BLD-MCNC: 16.3 G/DL — HIGH (ref 11.5–15.5)
HYPOCHROMIA BLD QL: SLIGHT — SIGNIFICANT CHANGE UP
LYMPHOCYTES # BLD AUTO: 1.16 K/UL — SIGNIFICANT CHANGE UP (ref 1–3.3)
LYMPHOCYTES # BLD AUTO: 19.3 % — SIGNIFICANT CHANGE UP (ref 13–44)
MACROCYTES BLD QL: SLIGHT — SIGNIFICANT CHANGE UP
MANUAL SMEAR VERIFICATION: SIGNIFICANT CHANGE UP
MCHC RBC-ENTMCNC: 30.6 PG — SIGNIFICANT CHANGE UP (ref 27–34)
MCHC RBC-ENTMCNC: 32 GM/DL — SIGNIFICANT CHANGE UP (ref 32–36)
MCV RBC AUTO: 95.7 FL — SIGNIFICANT CHANGE UP (ref 80–100)
METAMYELOCYTES # FLD: 0.9 % — HIGH (ref 0–0)
MICROCYTES BLD QL: SLIGHT — SIGNIFICANT CHANGE UP
MONOCYTES # BLD AUTO: 0.37 K/UL — SIGNIFICANT CHANGE UP (ref 0–0.9)
MONOCYTES NFR BLD AUTO: 6.1 % — SIGNIFICANT CHANGE UP (ref 2–14)
NEUTROPHILS # BLD AUTO: 4.06 K/UL — SIGNIFICANT CHANGE UP (ref 1.8–7.4)
NEUTROPHILS NFR BLD AUTO: 58.8 % — SIGNIFICANT CHANGE UP (ref 43–77)
NEUTS BAND # BLD: 8.8 % — HIGH (ref 0–8)
OVALOCYTES BLD QL SMEAR: SLIGHT — SIGNIFICANT CHANGE UP
PLAT MORPH BLD: NORMAL — SIGNIFICANT CHANGE UP
PLATELET # BLD AUTO: 163 K/UL — SIGNIFICANT CHANGE UP (ref 150–400)
PLATELET COUNT - ESTIMATE: NORMAL — SIGNIFICANT CHANGE UP
POIKILOCYTOSIS BLD QL AUTO: SLIGHT — SIGNIFICANT CHANGE UP
POTASSIUM SERPL-MCNC: 3.5 MMOL/L — SIGNIFICANT CHANGE UP (ref 3.5–5.3)
POTASSIUM SERPL-SCNC: 3.5 MMOL/L — SIGNIFICANT CHANGE UP (ref 3.5–5.3)
PROCALCITONIN SERPL-MCNC: 0.17 NG/ML — HIGH (ref 0.02–0.1)
PROT SERPL-MCNC: 7.8 G/DL — SIGNIFICANT CHANGE UP (ref 6.6–8.7)
RBC # BLD: 5.33 M/UL — HIGH (ref 3.8–5.2)
RBC # FLD: 13.5 % — SIGNIFICANT CHANGE UP (ref 10.3–14.5)
RBC BLD AUTO: NORMAL — SIGNIFICANT CHANGE UP
SMUDGE CELLS # BLD: PRESENT — SIGNIFICANT CHANGE UP
SODIUM SERPL-SCNC: 145 MMOL/L — SIGNIFICANT CHANGE UP (ref 135–145)
TSH SERPL-MCNC: 4.78 UIU/ML — HIGH (ref 0.27–4.2)
VARIANT LYMPHS # BLD: 6.1 % — HIGH (ref 0–6)
WBC # BLD: 6.01 K/UL — SIGNIFICANT CHANGE UP (ref 3.8–10.5)
WBC # FLD AUTO: 6.01 K/UL — SIGNIFICANT CHANGE UP (ref 3.8–10.5)

## 2020-04-29 PROCEDURE — 99233 SBSQ HOSP IP/OBS HIGH 50: CPT

## 2020-04-29 RX ORDER — ACETAMINOPHEN 500 MG
650 TABLET ORAL EVERY 6 HOURS
Refills: 0 | Status: DISCONTINUED | OUTPATIENT
Start: 2020-04-29 | End: 2020-04-30

## 2020-04-29 RX ORDER — ACETAMINOPHEN 500 MG
650 TABLET ORAL EVERY 6 HOURS
Refills: 0 | Status: DISCONTINUED | OUTPATIENT
Start: 2020-04-29 | End: 2020-05-11

## 2020-04-29 RX ADMIN — ENOXAPARIN SODIUM 40 MILLIGRAM(S): 100 INJECTION SUBCUTANEOUS at 18:15

## 2020-04-29 RX ADMIN — ENOXAPARIN SODIUM 40 MILLIGRAM(S): 100 INJECTION SUBCUTANEOUS at 05:52

## 2020-04-29 RX ADMIN — ATENOLOL 12.5 MILLIGRAM(S): 25 TABLET ORAL at 05:52

## 2020-04-29 RX ADMIN — Medication 81 MILLIGRAM(S): at 16:28

## 2020-04-29 RX ADMIN — ATORVASTATIN CALCIUM 10 MILLIGRAM(S): 80 TABLET, FILM COATED ORAL at 21:28

## 2020-04-29 RX ADMIN — RISPERIDONE 0.25 MILLIGRAM(S): 4 TABLET ORAL at 21:29

## 2020-04-29 RX ADMIN — MEMANTINE HYDROCHLORIDE 5 MILLIGRAM(S): 10 TABLET ORAL at 09:04

## 2020-04-29 RX ADMIN — AMLODIPINE BESYLATE 2.5 MILLIGRAM(S): 2.5 TABLET ORAL at 05:52

## 2020-04-29 RX ADMIN — Medication 650 MILLIGRAM(S): at 16:26

## 2020-04-29 RX ADMIN — Medication 650 MILLIGRAM(S): at 09:30

## 2020-04-29 RX ADMIN — Medication 10 MILLILITER(S): at 21:31

## 2020-04-29 NOTE — PHYSICAL THERAPY INITIAL EVALUATION ADULT - LEVEL OF INDEPENDENCE: STAND/SIT, REHAB EVAL
deemed unsafe to perform at this time, Pt is at baseline, no further PT services are indicated, will not follow./unable to perform

## 2020-04-29 NOTE — PHYSICAL THERAPY INITIAL EVALUATION ADULT - IMPAIRMENTS CONTRIBUTING IMPAIRED BED MOBILITY, REHAB EVAL
abnormal muscle tone/impaired balance/decreased ROM/decreased strength/cognition/impaired postural control

## 2020-04-29 NOTE — PHYSICAL THERAPY INITIAL EVALUATION ADULT - PHYSICAL ASSIST/NONPHYSICAL ASSIST: SUPINE/SIT, REHAB EVAL
1 person assist/dependent: pt is unable to provide physical effort/work to perform task and requires treating physical therapist to perform 100% of physical effort/work; pt required increased physical assistance to get bilateral LE's out of bed and to help assume proper upright sitting at EOB posture; pt unable to assume full upright sitting at EOB; verbal cues for proper sequence + proper use of bedrails/verbal cues

## 2020-04-29 NOTE — PHYSICAL THERAPY INITIAL EVALUATION ADULT - ADDITIONAL COMMENTS
Pt was unable to provide social hx + PLOF due to hx of dementia/apparent cognitive deficit + pt was nonverbal, however as per EMR, pt lives at [facility] + was WC bound. f/u with social work &/or case management to obtain additional information. At this time, mechanical jasmin lift and wheelchair with elevating leg rests with calf pads and pressure alleviating seat cushion (ie roho) as pt will be sitting in chair > 4 hours and will be primary means of functional mobility, DME is recommended upon d/c.

## 2020-04-29 NOTE — PHYSICAL THERAPY INITIAL EVALUATION ADULT - LEVEL OF INDEPENDENCE: SIT/SUPINE, REHAB EVAL
n/a, deemed inappropriate to perform at this time. Pt is at baseline, no further PT services are indicated, will not follow.

## 2020-04-29 NOTE — PROGRESS NOTE ADULT - SUBJECTIVE AND OBJECTIVE BOX
Medicine Progress Note    Patient is a 80y old  Female who presents with a chief complaint of AMS/lethargy (2020 13:56)      SUBJECTIVE / OVERNIGHT EVENTS:  Pt seen and evaluated.  Pt somewhat more alert today.  Satting 93% on room air.  Febrile to 100.7 this morning.      MEDICATIONS  (STANDING):  amLODIPine   Tablet 2.5 milliGRAM(s) Oral daily  aspirin enteric coated 81 milliGRAM(s) Oral daily  ATENolol  Tablet 12.5 milliGRAM(s) Oral daily  atorvastatin 10 milliGRAM(s) Oral at bedtime  enoxaparin Injectable 40 milliGRAM(s) SubCutaneous every 12 hours  memantine 10 milliGRAM(s) Oral <User Schedule>  memantine 5 milliGRAM(s) Oral <User Schedule>    MEDICATIONS  (PRN):  acetaminophen   Tablet .. 650 milliGRAM(s) Oral every 6 hours PRN Temp greater or equal to 38C (100.4F), Mild Pain (1 - 3)  ALBUTerol    90 MICROgram(s) HFA Inhaler 2 Puff(s) Inhalation every 4 hours PRN Shortness of Breath and/or Wheezing  benzonatate 100 milliGRAM(s) Oral three times a day PRN Cough  guaifenesin/dextromethorphan  Syrup 10 milliLiter(s) Oral every 4 hours PRN Cough  risperiDONE   Tablet 0.25 milliGRAM(s) Oral every 12 hours PRN sundowning or agitation    CAPILLARY BLOOD GLUCOSE        I&O's Summary    2020 07:01  -  2020 07:00  --------------------------------------------------------  IN: 480 mL / OUT: 0 mL / NET: 480 mL        PHYSICAL EXAM:  Vital Signs Last 24 Hrs  T(C): 38.2 (2020 08:20), Max: 38.2 (2020 08:20)  T(F): 100.7 (2020 08:20), Max: 100.7 (2020 08:20)  HR: 63 (2020 08:20) (63 - 81)  BP: 158/91 (2020 08:20) (132/86 - 158/91)  BP(mean): 98 (2020 04:12) (98 - 98)  RR: 20 (2020 08:20) (18 - 20)  SpO2: 93% (2020 08:20) (92% - 93%)      LABS:                        16.3   6.01  )-----------( 163      ( 2020 07:05 )             51.0         145  |  104  |  20.0  ----------------------------<  100<H>  3.5   |  23.0  |  0.77    Ca    9.1      2020 07:05    TPro  7.8  /  Alb  3.8  /  TBili  0.7  /  DBili  x   /  AST  49<H>  /  ALT  28  /  AlkPhos  121<H>      C-Reactive Protein, Serum (20 @ 07:05)    C-Reactive Protein, Serum: 9.44 mg/dL    Ferritin, Serum in AM (20 @ 07:05)    Ferritin, Serum: 1207 ng/mL    D-Dimer Assay, Quantitative (20 @ 07:05)    D-Dimer Assay, Quantitative: 276 ng/mL DDU    Thyroid Stimulating Hormone, Serum in AM (20 @ 07:05)    Thyroid Stimulating Hormone, Serum: 4.78 uIU/mL    Urinalysis Basic - ( 2020 19:26 )    Color: Yellow / Appearance: Clear / S.010 / pH: x  Gluc: x / Ketone: Small  / Bili: Negative / Urobili: 4 mg/dL   Blood: x / Protein: 100 mg/dL / Nitrite: Negative   Leuk Esterase: Negative / RBC: 3-5 /HPF / WBC Negative   Sq Epi: x / Non Sq Epi: Occasional / Bacteria: x        COVID-19 PCR: Detected (2020 10:01)      RADIOLOGY & ADDITIONAL TESTS:  Imaging from Last 24 Hours:    Electrocardiogram/QTc Interval:    COORDINATION OF CARE:  Care Discussed with Consultants/Other Providers:

## 2020-04-29 NOTE — PHYSICAL THERAPY INITIAL EVALUATION ADULT - MUSCLE TONE ASSESSMENT, REHAB EVAL
moderately increased tone/severely increased tone/moderately to severely increased tone/bilateral lower extremities

## 2020-04-29 NOTE — PHYSICAL THERAPY INITIAL EVALUATION ADULT - GENERAL OBSERVATIONS, REHAB EVAL
Pt received in Tent, ANAND zepeda'ed pt for PT; pt observed sitting upright in bed with 2 L O2 NC, pleasant, mostly nonverbal (able to state name and  only), A&O (person only) and c/o 0/10 pain

## 2020-04-29 NOTE — PHYSICAL THERAPY INITIAL EVALUATION ADULT - REFERRAL TO ANOTHER SERVICE NEEDED, PT EVAL
social work/social work &/or case management consult recommended to help assist with safe d/c planning

## 2020-04-29 NOTE — PROGRESS NOTE ADULT - ASSESSMENT
Patient is a 80yF PMH of CVA, dementia, HLD, HTN presented to the ED for evaluation of weakness, lethargy and decreased PO intake. Admitted for acute metabolic encephalopathy 2/2 COVID.     #Acute Met Encephalopathy 2/2 viral infection most likely 2/2 covid.  w/u for other etiologies negative to date.  - History of CVA and dementia, Baseline A/O x1 (self)  - Per facility and , AMS and worsening functional status from baseline. (decreased PO intake, unable to ambulate, less verbal/responsive)  - CT head noted with chronic changes / no acute changes.  - Passed bedside swallow eval- c/w Clear liquid diet   - f/u speech eval  - f/u PT eval  - Appreciate palliative care input.    #Episodic hypoxia 2/2 COVID - desatted to 85% in ER requiring supplemental O2, though currently satting well on room air.  - currently satting 95% on room air at rest   - CXR showing small left infiltrate  - afebrile, no leukocytosis  - elevated inflammatory markers, will continue to trend  - lactate 2.1 likely due to hypoxia, sp 1L LR IVF in ED, restrict IVFs to prevent progression to ARDS, f/u repeat lactate  - tylenol, tessalon pearls, robitussin prn  - supplemental O2 as needed to maintain O2 sat>92%,  - encourage prone position for 15mins qhr, incentive spirometer    #History of Dementia  - Namenda   - home med of [employer] non formulary, family to provide    #Hypertension  - Continue atenolol, amlodipine   - monitor blood pressure     #Hyperlipidemia  - Continue atorvastatin.     # DVT Prophylactic   - Lovenox SC

## 2020-04-29 NOTE — PHYSICAL THERAPY INITIAL EVALUATION ADULT - ASR EQUIP NEEDS DISCH PT EVAL
ongoing assessment re d/c DME recommendations; f/u with social work &/or case management to obtain additional information re what DME pt has at home./wheelchair

## 2020-04-30 LAB
ALBUMIN SERPL ELPH-MCNC: 3.5 G/DL — SIGNIFICANT CHANGE UP (ref 3.3–5.2)
ALP SERPL-CCNC: 132 U/L — HIGH (ref 40–120)
ALT FLD-CCNC: 51 U/L — HIGH
ANION GAP SERPL CALC-SCNC: 18 MMOL/L — HIGH (ref 5–17)
AST SERPL-CCNC: 108 U/L — HIGH
BILIRUB SERPL-MCNC: 0.8 MG/DL — SIGNIFICANT CHANGE UP (ref 0.4–2)
BUN SERPL-MCNC: 22 MG/DL — HIGH (ref 8–20)
CALCIUM SERPL-MCNC: 9.2 MG/DL — SIGNIFICANT CHANGE UP (ref 8.6–10.2)
CHLORIDE SERPL-SCNC: 109 MMOL/L — HIGH (ref 98–107)
CO2 SERPL-SCNC: 20 MMOL/L — LOW (ref 22–29)
CREAT SERPL-MCNC: 0.68 MG/DL — SIGNIFICANT CHANGE UP (ref 0.5–1.3)
GLUCOSE SERPL-MCNC: 113 MG/DL — HIGH (ref 70–99)
HCT VFR BLD CALC: 52 % — HIGH (ref 34.5–45)
HGB BLD-MCNC: 16.5 G/DL — HIGH (ref 11.5–15.5)
MCHC RBC-ENTMCNC: 30.6 PG — SIGNIFICANT CHANGE UP (ref 27–34)
MCHC RBC-ENTMCNC: 31.7 GM/DL — LOW (ref 32–36)
MCV RBC AUTO: 96.3 FL — SIGNIFICANT CHANGE UP (ref 80–100)
PLATELET # BLD AUTO: 154 K/UL — SIGNIFICANT CHANGE UP (ref 150–400)
POTASSIUM SERPL-MCNC: 4.6 MMOL/L — SIGNIFICANT CHANGE UP (ref 3.5–5.3)
POTASSIUM SERPL-SCNC: 4.6 MMOL/L — SIGNIFICANT CHANGE UP (ref 3.5–5.3)
PROCALCITONIN SERPL-MCNC: 7.69 NG/ML — HIGH (ref 0.02–0.1)
PROT SERPL-MCNC: 8.3 G/DL — SIGNIFICANT CHANGE UP (ref 6.6–8.7)
RBC # BLD: 5.4 M/UL — HIGH (ref 3.8–5.2)
RBC # FLD: 13.4 % — SIGNIFICANT CHANGE UP (ref 10.3–14.5)
SODIUM SERPL-SCNC: 147 MMOL/L — HIGH (ref 135–145)
T4 FREE SERPL-MCNC: 1.2 NG/DL — SIGNIFICANT CHANGE UP (ref 0.9–1.8)
WBC # BLD: 13.75 K/UL — HIGH (ref 3.8–10.5)
WBC # FLD AUTO: 13.75 K/UL — HIGH (ref 3.8–10.5)

## 2020-04-30 PROCEDURE — 99232 SBSQ HOSP IP/OBS MODERATE 35: CPT

## 2020-04-30 PROCEDURE — 99233 SBSQ HOSP IP/OBS HIGH 50: CPT

## 2020-04-30 RX ORDER — HYDRALAZINE HCL 50 MG
5 TABLET ORAL EVERY 8 HOURS
Refills: 0 | Status: DISCONTINUED | OUTPATIENT
Start: 2020-04-30 | End: 2020-05-08

## 2020-04-30 RX ORDER — HALOPERIDOL DECANOATE 100 MG/ML
1 INJECTION INTRAMUSCULAR EVERY 12 HOURS
Refills: 0 | Status: DISCONTINUED | OUTPATIENT
Start: 2020-04-30 | End: 2020-05-07

## 2020-04-30 RX ADMIN — ENOXAPARIN SODIUM 40 MILLIGRAM(S): 100 INJECTION SUBCUTANEOUS at 17:32

## 2020-04-30 RX ADMIN — MEMANTINE HYDROCHLORIDE 5 MILLIGRAM(S): 10 TABLET ORAL at 09:47

## 2020-04-30 RX ADMIN — ATENOLOL 12.5 MILLIGRAM(S): 25 TABLET ORAL at 06:08

## 2020-04-30 RX ADMIN — ENOXAPARIN SODIUM 40 MILLIGRAM(S): 100 INJECTION SUBCUTANEOUS at 06:09

## 2020-04-30 RX ADMIN — Medication 81 MILLIGRAM(S): at 09:51

## 2020-04-30 RX ADMIN — MEMANTINE HYDROCHLORIDE 10 MILLIGRAM(S): 10 TABLET ORAL at 17:32

## 2020-04-30 RX ADMIN — Medication 5 MILLIGRAM(S): at 22:58

## 2020-04-30 RX ADMIN — AMLODIPINE BESYLATE 2.5 MILLIGRAM(S): 2.5 TABLET ORAL at 06:08

## 2020-04-30 NOTE — SWALLOW BEDSIDE ASSESSMENT ADULT - SWALLOW EVAL: DIAGNOSIS
Moderate to moderate-severe oral dysphagia negatively impacted by reduced cognition.  Pharyngeal stage appears WFL for consistencies administered, no overt signs/symptoms of penetration/aspiration observed.

## 2020-04-30 NOTE — SWALLOW BEDSIDE ASSESSMENT ADULT - ADDITIONAL RECOMMENDATIONS
CLOSE MONITORING OF PO TOLERANCE; consider NPO with short term alternative means of nutrition/hydration pending pt wishes if any signs/symptoms of penetration/aspiration are observed

## 2020-04-30 NOTE — CHART NOTE - NSCHARTNOTEFT_GEN_A_CORE
Called by nurse as patient just had an official speech and swallow. As per nurse, the therapist recommended Thin liquid diet only as the patient has a tendency to hold solids and semisolid foods in her mouth as well as her medications. She recommended to try to switch her medications to non-PO.   Unable to reach attending on record.  Switched her from Amlodipine PO and Atenolol PO which she is on for HTN to Hydralazine 5mg Q8h standing with holding parameters.   Switched Risperidal PO PRN to Haldol 1mg PRN for sundowning and agitation.  Unable to remove ASA, statin and Namenda.

## 2020-04-30 NOTE — SWALLOW BEDSIDE ASSESSMENT ADULT - COMMENTS
RN reports pt passed bedside swallow screener with thin liquids however pt with oral holding of jello. pt with poor orientation to straw sip (biting response)

## 2020-04-30 NOTE — PROGRESS NOTE ADULT - ASSESSMENT
79 yo F Frail elderly PMH- Dementia  Admitted with +COVID Left Lung Infiltrate    COVID+    Non labored breathing  O2 Nasal Cannula 2L  Treat symptoms aggressively    Dementia-Delirium (hypoactive  Risperdone prn for agitation    GOC    DNR/DNI MANPREET  reviewed with  Maurisio CASE completed today  Will be placed in [facility]  Will Follow for symptom managment  Communicate with spouse

## 2020-04-30 NOTE — SWALLOW BEDSIDE ASSESSMENT ADULT - SLP GENERAL OBSERVATIONS
Pt received resting in bed, awake but lethargic, keeping eyes closed, nonverbal, good orientation to spoon and cup presentation

## 2020-04-30 NOTE — PROGRESS NOTE ADULT - SUBJECTIVE AND OBJECTIVE BOX
INTERVAL HPI/OVERNIGHT EVENTS: 79 yo Female Patient admitted from [facility] with worsening SOB, weakness AMS not eating or drinking Tested +COVID 19  F/U Note  Patient lying flat in bed, grimacing   Mental status deteriorating  Not responsive to verbal stimuli  Not withdrawing from pain while  attempts to draw blood.  Fever within last 24 hrs   Breathing nonlabored Tolerating Nasal Cannula at 2L  Passed Swallow evaluation, have been offering her liquids     80y old  Female who presents with a chief complaint of AMS/lethargy (2020 10:26)  PAST MEDICAL & SURGICAL HISTORY:  CVA (cerebral vascular accident)  Dementia  Hypertension  S/P colonoscopy with polypectomy  S/P inguinal hernia repair: Bilateral  age 17    Present Symptoms:     Dyspnea: 0 1    Nausea/Vomiting:  No  Anxiety:   No  Depression: No  Fatigue: Yes   Loss of appetite: Yes     Pain: no reacting to painful stimuli            Character-            Duration-            Effect-            Factors-            Frequency-            Location-            Severity-    Review of Systems: Reviewed                   Unable to obtain due to poor mentation       MEDICATIONS  (STANDING):  amLODIPine   Tablet 2.5 milliGRAM(s) Oral daily  aspirin enteric coated 81 milliGRAM(s) Oral daily  ATENolol  Tablet 12.5 milliGRAM(s) Oral daily  atorvastatin 10 milliGRAM(s) Oral at bedtime  enoxaparin Injectable 40 milliGRAM(s) SubCutaneous every 12 hours  memantine 10 milliGRAM(s) Oral <User Schedule>  memantine 5 milliGRAM(s) Oral <User Schedule>    MEDICATIONS  (PRN):  acetaminophen   Tablet .. 650 milliGRAM(s) Oral every 6 hours PRN Temp greater or equal to 38C (100.4F), Mild Pain (1 - 3)  acetaminophen  Suppository .. 650 milliGRAM(s) Rectal every 6 hours PRN Temp greater or equal to 38C (100.4F), Mild Pain (1 - 3)  ALBUTerol    90 MICROgram(s) HFA Inhaler 2 Puff(s) Inhalation every 4 hours PRN Shortness of Breath and/or Wheezing  benzonatate 100 milliGRAM(s) Oral three times a day PRN Cough  guaifenesin/dextromethorphan  Syrup 10 milliLiter(s) Oral every 4 hours PRN Cough  risperiDONE   Tablet 0.25 milliGRAM(s) Oral every 12 hours PRN sundowning or agitation      PHYSICAL EXAM: Due to the nature of this patient's COVID-19 isolation status, no bedside physical exam was done to limit spread of infection. Examination highlights were provided by bedside nurse and primary team. Objective data were reviewed in detail.    LABS:                        16.5   13.75 )-----------( 154      ( 2020 05:27 )             52.0         147<H>  |  109<H>  |  22.0<H>  ----------------------------<  113<H>  4.6   |  20.0<L>  |  0.68    Ca    9.2      2020 05:27    TPro  8.3  /  Alb  3.5  /  TBili  0.8  /  DBili  x   /  AST  108<H>  /  ALT  51<H>  /  AlkPhos  132<H>        Urinalysis Basic - ( 2020 19:26 )    Color: Yellow / Appearance: Clear / S.010 / pH: x  Gluc: x / Ketone: Small  / Bili: Negative / Urobili: 4 mg/dL   Blood: x / Protein: 100 mg/dL / Nitrite: Negative   Leuk Esterase: Negative / RBC: 3-5 /HPF / WBC Negative   Sq Epi: x / Non Sq Epi: Occasional / Bacteria: x    I&O's Summary    RADIOLOGY & ADDITIONAL STUDIES:  < from: Xray Chest 1 View-PORTABLE IMMEDIATE (20 @ 10:49) >  COMPARISON STUDY: 2019    Frontal expiratory view of the chestshows the heart to be slightly enlarged in size. The lungs show small infiltrate along the left heart border and there is no evidence of pneumothorax nor pleural effusion.    IMPRESSION:  Small left infiltrate.    Thank you for the courtesy of this referral.      ADVANCE DIRECTIVES:   DNR YES  Completed on:                     MOLST  YES   Completed on: Reconfirmed today 2020  Living Will  NO   Completed on:

## 2020-04-30 NOTE — PROGRESS NOTE ADULT - ASSESSMENT
80yF PMH of CVA, dementia, HLD, HTN presented to the ED for evaluation of weakness, lethargy and decreased PO intake. Admitted for acute metabolic encephalopathy 2/2 COVID.     #Acute Met Encephalopathy 2/2 viral infection most likely 2/2 covid.  w/u for other etiologies negative to date.  - History of CVA and dementia, Baseline A/O x1 (self)  - Per facility and , AMS and worsening functional status from baseline. (decreased PO intake, unable to ambulate, less verbal/responsive)  - CT head noted with chronic changes / no acute changes.  - Passed bedside swallow eval- c/w Clear liquid diet   - f/u speech eval  - f/u PT eval  - Appreciate palliative care input    #hypoxia 2/2 COVID - desatted to 85% in ER requiring supplemental O2, though currently satting well on room air.  - currently on room air at rest. Supplemental O2 as needed to maintain O2 sat>90%  - CXR showing small left infiltrate  - afebrile, no leukocytosis  - elevated inflammatory markers, will continue to trend  - tylenol, tessalon pearls, robitussin prn  - encourage prone position for 15mins qhr, incentive spirometer    #History of Dementia  - Namenda   - home med of Elelon non formulary, family to provide    #Hypertension  - Continue atenolol, amlodipine   - monitor blood pressure     #Hyperlipidemia  - Continue atorvastatin.     # DVT Prophylactic   - Lovenox SC     DNR

## 2020-04-30 NOTE — SWALLOW BEDSIDE ASSESSMENT ADULT - SLP PERTINENT HISTORY OF CURRENT PROBLEM
80yF PMH of CVA, dementia, HLD, HTN presented to the ED for evaluation of weakness, lethargy and decreased PO intake. Admitted for acute metabolic encephalopathy 2/2 COVID.

## 2020-04-30 NOTE — SWALLOW BEDSIDE ASSESSMENT ADULT - SWALLOW EVAL: RECOMMENDED FEEDING/EATING TECHNIQUES
position upright (90 degrees)/FEED ONLY WHEN AWAKE AND ALERT/maintain upright posture during/after eating for 30 mins

## 2020-04-30 NOTE — PROGRESS NOTE ADULT - SUBJECTIVE AND OBJECTIVE BOX
INTERVAL HISTORY/SUBJECTIVE:    Pt seen and evaluated at bedside. No acute overnight events. Afebrile.     MEDICATIONS  (STANDING):  amLODIPine   Tablet 2.5 milliGRAM(s) Oral daily  aspirin enteric coated 81 milliGRAM(s) Oral daily  ATENolol  Tablet 12.5 milliGRAM(s) Oral daily  atorvastatin 10 milliGRAM(s) Oral at bedtime  enoxaparin Injectable 40 milliGRAM(s) SubCutaneous every 12 hours  memantine 10 milliGRAM(s) Oral <User Schedule>  memantine 5 milliGRAM(s) Oral <User Schedule>    MEDICATIONS  (PRN):  acetaminophen   Tablet .. 650 milliGRAM(s) Oral every 6 hours PRN Temp greater or equal to 38C (100.4F), Mild Pain (1 - 3)  acetaminophen  Suppository .. 650 milliGRAM(s) Rectal every 6 hours PRN Temp greater or equal to 38C (100.4F), Mild Pain (1 - 3)  ALBUTerol    90 MICROgram(s) HFA Inhaler 2 Puff(s) Inhalation every 4 hours PRN Shortness of Breath and/or Wheezing  benzonatate 100 milliGRAM(s) Oral three times a day PRN Cough  guaifenesin/dextromethorphan  Syrup 10 milliLiter(s) Oral every 4 hours PRN Cough  risperiDONE   Tablet 0.25 milliGRAM(s) Oral every 12 hours PRN sundowning or agitation    CAPILLARY BLOOD GLUCOSE    I&O's Summary    2020 07:01  -  2020 07:00  --------------------------------------------------------  IN: 480 mL / OUT: 0 mL / NET: 480 mL    Vital Signs Last 24 Hrs  T(C): 36.7 (2020 08:13), Max: 39.1 (2020 16:12)  T(F): 98.1 (2020 08:13), Max: 102.3 (2020 16:12)  HR: 82 (2020 08:13) (78 - 84)  BP: 171/93 (2020 08:13) (145/80 - 171/93)  BP(mean): --  RR: --  SpO2: 97% (2020 08:40) (94% - 98%)    LABS:             CBC Full  -  ( 2020 05:27 )  WBC Count : 13.75 K/uL  RBC Count : 5.40 M/uL  Hemoglobin : 16.5 g/dL  Hematocrit : 52.0 %  Platelet Count - Automated : 154 K/uL  Mean Cell Volume : 96.3 fl  Mean Cell Hemoglobin : 30.6 pg  Mean Cell Hemoglobin Concentration : 31.7 gm/dL  Auto Neutrophil # : x  Auto Lymphocyte # : x  Auto Monocyte # : x  Auto Eosinophil # : x  Auto Basophil # : x  Auto Neutrophil % : x  Auto Lymphocyte % : x  Auto Monocyte % : x  Auto Eosinophil % : x  Auto Basophil % : x               16.3   6.01  )-----------( 163      ( 2020 07:05 )             51.0         145  |  104  |  20.0  ----------------------------<  100<H>  3.5   |  23.0  |  0.77    Ca    9.1      2020 07:05    TPro  7.8  /  Alb  3.8  /  TBili  0.7  /  DBili  x   /  AST  49<H>  /  ALT  28  /  AlkPhos  121<H>      C-Reactive Protein, Serum (20 @ 07:05)    C-Reactive Protein, Serum: 9.44 mg/dL    Ferritin, Serum in AM (20 @ 07:05)    Ferritin, Serum: 1207 ng/mL    D-Dimer Assay, Quantitative (20 @ 07:05)    D-Dimer Assay, Quantitative: 276 ng/mL DDU    Thyroid Stimulating Hormone, Serum in AM (20 @ 07:05)    Thyroid Stimulating Hormone, Serum: 4.78 uIU/mL    Urinalysis Basic - ( 2020 19:26 )    Color: Yellow / Appearance: Clear / S.010 / pH: x  Gluc: x / Ketone: Small  / Bili: Negative / Urobili: 4 mg/dL   Blood: x / Protein: 100 mg/dL / Nitrite: Negative   Leuk Esterase: Negative / RBC: 3-5 /HPF / WBC Negative   Sq Epi: x / Non Sq Epi: Occasional / Bacteria: x        COVID-19 PCR: Detected (2020 10:01)      RADIOLOGY & ADDITIONAL TESTS:  Imaging from Last 24 Hours:    Electrocardiogram/QTc Interval:    COORDINATION OF CARE:  Care Discussed with Consultants/Other Providers:

## 2020-05-01 LAB
ALBUMIN SERPL ELPH-MCNC: 2.9 G/DL — LOW (ref 3.3–5.2)
ALP SERPL-CCNC: 115 U/L — SIGNIFICANT CHANGE UP (ref 40–120)
ALT FLD-CCNC: 36 U/L — HIGH
ANION GAP SERPL CALC-SCNC: 19 MMOL/L — HIGH (ref 5–17)
AST SERPL-CCNC: 72 U/L — HIGH
BILIRUB SERPL-MCNC: 0.7 MG/DL — SIGNIFICANT CHANGE UP (ref 0.4–2)
BUN SERPL-MCNC: 31 MG/DL — HIGH (ref 8–20)
CALCIUM SERPL-MCNC: 9.3 MG/DL — SIGNIFICANT CHANGE UP (ref 8.6–10.2)
CHLORIDE SERPL-SCNC: 111 MMOL/L — HIGH (ref 98–107)
CO2 SERPL-SCNC: 20 MMOL/L — LOW (ref 22–29)
CREAT SERPL-MCNC: 0.89 MG/DL — SIGNIFICANT CHANGE UP (ref 0.5–1.3)
CRP SERPL-MCNC: 35.78 MG/DL — HIGH (ref 0–0.4)
FERRITIN SERPL-MCNC: 2480 NG/ML — HIGH (ref 15–150)
GLUCOSE SERPL-MCNC: 114 MG/DL — HIGH (ref 70–99)
HCT VFR BLD CALC: 46.4 % — HIGH (ref 34.5–45)
HGB BLD-MCNC: 14.8 G/DL — SIGNIFICANT CHANGE UP (ref 11.5–15.5)
LDH SERPL L TO P-CCNC: 558 U/L — HIGH (ref 98–192)
MCHC RBC-ENTMCNC: 30.5 PG — SIGNIFICANT CHANGE UP (ref 27–34)
MCHC RBC-ENTMCNC: 31.9 GM/DL — LOW (ref 32–36)
MCV RBC AUTO: 95.5 FL — SIGNIFICANT CHANGE UP (ref 80–100)
PLATELET # BLD AUTO: 208 K/UL — SIGNIFICANT CHANGE UP (ref 150–400)
POTASSIUM SERPL-MCNC: 3.3 MMOL/L — LOW (ref 3.5–5.3)
POTASSIUM SERPL-SCNC: 3.3 MMOL/L — LOW (ref 3.5–5.3)
PROT SERPL-MCNC: 7.5 G/DL — SIGNIFICANT CHANGE UP (ref 6.6–8.7)
RBC # BLD: 4.86 M/UL — SIGNIFICANT CHANGE UP (ref 3.8–5.2)
RBC # FLD: 13.6 % — SIGNIFICANT CHANGE UP (ref 10.3–14.5)
SODIUM SERPL-SCNC: 150 MMOL/L — HIGH (ref 135–145)
WBC # BLD: 12.94 K/UL — HIGH (ref 3.8–10.5)
WBC # FLD AUTO: 12.94 K/UL — HIGH (ref 3.8–10.5)

## 2020-05-01 PROCEDURE — 99222 1ST HOSP IP/OBS MODERATE 55: CPT

## 2020-05-01 PROCEDURE — 99233 SBSQ HOSP IP/OBS HIGH 50: CPT

## 2020-05-01 PROCEDURE — 99231 SBSQ HOSP IP/OBS SF/LOW 25: CPT

## 2020-05-01 RX ORDER — AZITHROMYCIN 500 MG/1
250 TABLET, FILM COATED ORAL EVERY 24 HOURS
Refills: 0 | Status: COMPLETED | OUTPATIENT
Start: 2020-05-02 | End: 2020-05-05

## 2020-05-01 RX ORDER — AZITHROMYCIN 500 MG/1
500 TABLET, FILM COATED ORAL ONCE
Refills: 0 | Status: COMPLETED | OUTPATIENT
Start: 2020-05-01 | End: 2020-05-01

## 2020-05-01 RX ORDER — AZITHROMYCIN 500 MG/1
TABLET, FILM COATED ORAL
Refills: 0 | Status: COMPLETED | OUTPATIENT
Start: 2020-05-01 | End: 2020-05-06

## 2020-05-01 RX ORDER — DEXTROSE MONOHYDRATE, SODIUM CHLORIDE, AND POTASSIUM CHLORIDE 50; .745; 4.5 G/1000ML; G/1000ML; G/1000ML
500 INJECTION, SOLUTION INTRAVENOUS
Refills: 0 | Status: DISCONTINUED | OUTPATIENT
Start: 2020-05-01 | End: 2020-05-02

## 2020-05-01 RX ORDER — POTASSIUM CHLORIDE 20 MEQ
20 PACKET (EA) ORAL ONCE
Refills: 0 | Status: DISCONTINUED | OUTPATIENT
Start: 2020-05-01 | End: 2020-05-01

## 2020-05-01 RX ORDER — CEFTRIAXONE 500 MG/1
1000 INJECTION, POWDER, FOR SOLUTION INTRAMUSCULAR; INTRAVENOUS EVERY 24 HOURS
Refills: 0 | Status: COMPLETED | OUTPATIENT
Start: 2020-05-01 | End: 2020-05-05

## 2020-05-01 RX ORDER — POTASSIUM CHLORIDE 20 MEQ
20 PACKET (EA) ORAL ONCE
Refills: 0 | Status: COMPLETED | OUTPATIENT
Start: 2020-05-01 | End: 2020-05-01

## 2020-05-01 RX ADMIN — Medication 5 MILLIGRAM(S): at 14:26

## 2020-05-01 RX ADMIN — AZITHROMYCIN 255 MILLIGRAM(S): 500 TABLET, FILM COATED ORAL at 16:48

## 2020-05-01 RX ADMIN — MEMANTINE HYDROCHLORIDE 10 MILLIGRAM(S): 10 TABLET ORAL at 16:58

## 2020-05-01 RX ADMIN — ENOXAPARIN SODIUM 40 MILLIGRAM(S): 100 INJECTION SUBCUTANEOUS at 16:58

## 2020-05-01 RX ADMIN — CEFTRIAXONE 100 MILLIGRAM(S): 500 INJECTION, POWDER, FOR SOLUTION INTRAMUSCULAR; INTRAVENOUS at 14:56

## 2020-05-01 RX ADMIN — MEMANTINE HYDROCHLORIDE 5 MILLIGRAM(S): 10 TABLET ORAL at 11:57

## 2020-05-01 RX ADMIN — Medication 5 MILLIGRAM(S): at 21:18

## 2020-05-01 RX ADMIN — Medication 20 MILLIEQUIVALENT(S): at 14:56

## 2020-05-01 RX ADMIN — Medication 5 MILLIGRAM(S): at 06:09

## 2020-05-01 RX ADMIN — ENOXAPARIN SODIUM 40 MILLIGRAM(S): 100 INJECTION SUBCUTANEOUS at 06:09

## 2020-05-01 RX ADMIN — ATORVASTATIN CALCIUM 10 MILLIGRAM(S): 80 TABLET, FILM COATED ORAL at 21:18

## 2020-05-01 RX ADMIN — Medication 81 MILLIGRAM(S): at 11:57

## 2020-05-01 NOTE — PROGRESS NOTE ADULT - SUBJECTIVE AND OBJECTIVE BOX
INTERVAL HPI/OVERNIGHT EVENTS: 79yo Female Patient transferred from [facility] with worsening SOB, AMS fever + COVID 19  F/U Visit  Patient was awake-still nonverbal  Face less pallor  No new events overnight  Inflammatory Markers and CBC elevated  ID consult note appreciated  ABX ordered to treat empirical bacterial infection     80y old  Female who presents with a chief complaint of AMS/lethargy (01 May 2020 14:05)    Present Symptoms:   Dyspnea:  2  O2 2L desaturating to 91% today  Nausea/Vomiting:  No  Anxiety:  Yes   Depression:  No  Fatigue: Yes   Loss of appetite: Yes     Pain: no pain behaviors observed            Character-            Duration-            Effect-            Factors-            Frequency-            Location-            Severity-    Review of Systems: Reviewed                     Unable to obtain due to poor mentation       MEDICATIONS  (STANDING):  aspirin enteric coated 81 milliGRAM(s) Oral daily  atorvastatin 10 milliGRAM(s) Oral at bedtime  azithromycin  IVPB      cefTRIAXone   IVPB 1000 milliGRAM(s) IV Intermittent every 24 hours  enoxaparin Injectable 40 milliGRAM(s) SubCutaneous every 12 hours  hydrALAZINE Injectable 5 milliGRAM(s) IV Push every 8 hours  memantine 10 milliGRAM(s) Oral <User Schedule>  memantine 5 milliGRAM(s) Oral <User Schedule>  sodium chloride 0.45% with potassium chloride 20 mEq/L 500 milliLiter(s) (80 mL/Hr) IV Continuous <Continuous>    MEDICATIONS  (PRN):  acetaminophen  Suppository .. 650 milliGRAM(s) Rectal every 6 hours PRN Temp greater or equal to 38C (100.4F), Mild Pain (1 - 3)  ALBUTerol    90 MICROgram(s) HFA Inhaler 2 Puff(s) Inhalation every 4 hours PRN Shortness of Breath and/or Wheezing  guaifenesin/dextromethorphan  Syrup 10 milliLiter(s) Oral every 4 hours PRN Cough  haloperidol    Injectable 1 milliGRAM(s) IntraMuscular every 12 hours PRN For Sundowning or Agitation      PHYSICAL EXAM: Due to the nature of this patient's COVID-19 isolation status, no bedside physical exam was done to limit spread of infection. Examination highlights were provided by bedside nurse and primary team. Objective data were reviewed in detail.    LABS:                        14.8   12.94 )-----------( 208      ( 01 May 2020 05:20 )             46.4     05-01    150<H>  |  111<H>  |  31.0<H>  ----------------------------<  114<H>  3.3<L>   |  20.0<L>  |  0.89    Ca    9.3      01 May 2020 05:20    TPro  7.5  /  Alb  2.9<L>  /  TBili  0.7  /  DBili  x   /  AST  72<H>  /  ALT  36<H>  /  AlkPhos  115  05-01    I&O's Summary    30 Apr 2020 07:01  -  01 May 2020 07:00  --------------------------------------------------------  IN: 0 mL / OUT: 400 mL / NET: -400 mL    RADIOLOGY & ADDITIONAL STUDIES:    ADVANCE DIRECTIVES:   DNR YES Completed on:                     MANPREET  YES    Completed on:  Living Will  NO   Completed on:

## 2020-05-01 NOTE — PROGRESS NOTE ADULT - SUBJECTIVE AND OBJECTIVE BOX
INTERVAL HISTORY/SUBJECTIVE:    Pt seen and evaluated at bedside. Afebrile. Continues to be lethargic. Evaluated by speech and swallow- recommend thin liquids. Palliative care on board.     MEDICATIONS  (STANDING):  aspirin enteric coated 81 milliGRAM(s) Oral daily  atorvastatin 10 milliGRAM(s) Oral at bedtime  enoxaparin Injectable 40 milliGRAM(s) SubCutaneous every 12 hours  hydrALAZINE Injectable 5 milliGRAM(s) IV Push every 8 hours  memantine 10 milliGRAM(s) Oral <User Schedule>  memantine 5 milliGRAM(s) Oral <User Schedule>    MEDICATIONS  (PRN):  acetaminophen  Suppository .. 650 milliGRAM(s) Rectal every 6 hours PRN Temp greater or equal to 38C (100.4F), Mild Pain (1 - 3)  ALBUTerol    90 MICROgram(s) HFA Inhaler 2 Puff(s) Inhalation every 4 hours PRN Shortness of Breath and/or Wheezing  guaifenesin/dextromethorphan  Syrup 10 milliLiter(s) Oral every 4 hours PRN Cough  haloperidol    Injectable 1 milliGRAM(s) IntraMuscular every 12 hours PRN For Sundowning or Agitation    Vital Signs Last 24 Hrs  T(C): 36.6 (01 May 2020 08:30), Max: 37.2 (2020 16:10)  T(F): 97.8 (01 May 2020 08:30), Max: 98.9 (2020 16:10)  HR: 79 (01 May 2020 08:30) (56 - 79)  BP: 137/82 (01 May 2020 08:30) (101/51 - 153/89)  BP(mean): --  RR: 18 (01 May 2020 08:30) (18 - 20)  SpO2: 91% (01 May 2020 08:30) (83% - 93%)    LABS:    CBC Full  -  ( 01 May 2020 05:20 )  WBC Count : 12.94 K/uL  RBC Count : 4.86 M/uL  Hemoglobin : 14.8 g/dL  Hematocrit : 46.4 %  Platelet Count - Automated : 208 K/uL  Mean Cell Volume : 95.5 fl  Mean Cell Hemoglobin : 30.5 pg  Mean Cell Hemoglobin Concentration : 31.9 gm/dL  Auto Neutrophil # : x  Auto Lymphocyte # : x  Auto Monocyte # : x  Auto Eosinophil # : x  Auto Basophil # : x  Auto Neutrophil % : x  Auto Lymphocyte % : x  Auto Monocyte % : x  Auto Eosinophil % : x  Auto Basophil % : x               CBC Full  -  ( 2020 05:27 )  WBC Count : 13.75 K/uL  RBC Count : 5.40 M/uL  Hemoglobin : 16.5 g/dL  Hematocrit : 52.0 %  Platelet Count - Automated : 154 K/uL  Mean Cell Volume : 96.3 fl  Mean Cell Hemoglobin : 30.6 pg  Mean Cell Hemoglobin Concentration : 31.7 gm/dL  Auto Neutrophil # : x  Auto Lymphocyte # : x  Auto Monocyte # : x  Auto Eosinophil # : x  Auto Basophil # : x  Auto Neutrophil % : x  Auto Lymphocyte % : x  Auto Monocyte % : x  Auto Eosinophil % : x  Auto Basophil % : x               16.3   6.01  )-----------( 163      ( 2020 07:05 )             51.0         145  |  104  |  20.0  ----------------------------<  100<H>  3.5   |  23.0  |  0.77    Ca    9.1      2020 07:05    TPro  7.8  /  Alb  3.8  /  TBili  0.7  /  DBili  x   /  AST  49<H>  /  ALT  28  /  AlkPhos  121<H>      C-Reactive Protein, Serum (20 @ 07:05)    C-Reactive Protein, Serum: 9.44 mg/dL    Ferritin, Serum in AM (20 @ 07:05)    Ferritin, Serum: 1207 ng/mL    D-Dimer Assay, Quantitative (20 @ 07:05)    D-Dimer Assay, Quantitative: 276 ng/mL DDU    Thyroid Stimulating Hormone, Serum in AM (20 @ 07:05)    Thyroid Stimulating Hormone, Serum: 4.78 uIU/mL    Urinalysis Basic - ( 2020 19:26 )    Color: Yellow / Appearance: Clear / S.010 / pH: x  Gluc: x / Ketone: Small  / Bili: Negative / Urobili: 4 mg/dL   Blood: x / Protein: 100 mg/dL / Nitrite: Negative   Leuk Esterase: Negative / RBC: 3-5 /HPF / WBC Negative   Sq Epi: x / Non Sq Epi: Occasional / Bacteria: x        COVID-19 PCR: Detected (2020 10:01)      RADIOLOGY & ADDITIONAL TESTS:  Imaging from Last 24 Hours:    Electrocardiogram/QTc Interval:    COORDINATION OF CARE:  Care Discussed with Consultants/Other Providers:

## 2020-05-01 NOTE — DIETITIAN INITIAL EVALUATION ADULT. - OTHER INFO
Pt with h/o CVA, dementia, HLD, HTN presented to the ED for evaluation of weakness, lethargy and decreased PO intake. Admitted for acute metabolic encephalopathy 2/2 COVID.  Pt with minimal po intake upon admission per RN flowsheets.  As per swallow evaluation (4/30)- pt recommended to receive thin liquids only as she tends to pocket food.

## 2020-05-01 NOTE — CONSULT NOTE ADULT - SUBJECTIVE AND OBJECTIVE BOX
[facility]  INFECTIOUS DISEASES AND INTERNAL MEDICINE at [place]  =======================================================  Ag Cobos MD  Diplomates American Board of Internal Medicine and Infectious Diseases  Telephone [phone]  Fax            [phone]  =======================================================    [facility]-[id number]  FLORIDALMA SKAGGS   This  80yF PMH of CVA, dementia, HLD, HTN presented to the ED for evaluation of weakness, lethargy and decreased PO intake. Unable to obtain history from patient as is poor historian, currently minimally responsive due to lethargy.  Baseline AAOx1 (self).  Per care manager at [facility] for past 24 hrs they noted pt has been increasing lethargic and needed extra assistance with ADL's. Normally requires 1 person to assist but currently required 3 person assist. Normally has good appetite, now not eating or drinking, requiring 1:1 assistance with feeds. Pt also normally verbal but has had AMS and minimally responsive for past 1-2 days. Pt with positive exposure to mulitple roomates with COVID-19. Pt at bedside states she feels weak and has mild SOB. (27 Apr 2020 13:26)    patient tested positive for COVID-19 PCR: Detected (27 Apr 2020 10:01)  She was maintained on nasal cannula since admission.  patient cannot offer any information.   X ray of the chest showed left side infiltrate.     patient is seen and examined.     I have personally reviewed the labs and data; pertinent labs and data are listed in this note; please see below.   =======================================================  Past Medical & Surgical Hx:  =====================  PAST MEDICAL & SURGICAL HISTORY:  CVA (cerebral vascular accident)  Dementia  Hypertension  S/P colonoscopy with polypectomy  S/P inguinal hernia repair: Bilateral  age 17    Problem List:  ==========  HEALTH ISSUES - PROBLEM Dx:        Social Hx:  =======  no toxic habits currently    FAMILY HISTORY:  Family history of abdominal aortic aneurysm  Family history of stroke: Father  no significant family history of immunosuppressive disorders in mother or father   =======================================================  REVIEW OF SYSTEMS:  mental status limits exam  =======================================================  Allergies  No Known Allergies    Antibiotics:    Other medications:  aspirin enteric coated 81 milliGRAM(s) Oral daily  atorvastatin 10 milliGRAM(s) Oral at bedtime  enoxaparin Injectable 40 milliGRAM(s) SubCutaneous every 12 hours  hydrALAZINE Injectable 5 milliGRAM(s) IV Push every 8 hours  memantine 10 milliGRAM(s) Oral <User Schedule>  memantine 5 milliGRAM(s) Oral <User Schedule>     ======================================================  Physical Exam:  ============  T(F): 97.8 (01 May 2020 08:30), Max: 98.9 (30 Apr 2020 16:10)  HR: 79 (01 May 2020 08:30)  BP: 137/82 (01 May 2020 08:30)  RR: 18 (01 May 2020 08:30)  SpO2: 91% (01 May 2020 08:30) (91% - 93%)  temp max in last 48H T(F): , Max: 102.3 (04-29-20 @ 16:12)    General:  No acute distress.  Eye: Pupils are equal, round and reactive to light, Extraocular movements are intact, Normal conjunctiva.  HENT: Normocephalic, Oral mucosa is moist, No pharyngeal erythema, No sinus tenderness.  NASAL cannula in place.   Neck: Supple, No lymphadenopathy.  Respiratory: Lungs are clear to auscultation, Respirations are non-labored.  Cardiovascular: Normal rate, Regular rhythm,   Gastrointestinal: Soft, Non-tender, Non-distended, Normal bowel sounds.  Genitourinary: No costovertebral angle tenderness.  Lymphatics: No lymphadenopathy neck,   Musculoskeletal: Normal range of motion, Normal strength.  Integumentary: No rash.  Neurologic:  awake, minimally verbal      =======================================================  Labs:                        14.8   12.94 )-----------( 208      ( 01 May 2020 05:20 )             46.4      05-01    150<H>  |  111<H>  |  31.0<H>  ----------------------------<  114<H>  3.3<L>   |  20.0<L>  |  0.89    Ca    9.3      01 May 2020 05:20    TPro  7.5  /  Alb  2.9<L>  /  TBili  0.7  /  DBili  x   /  AST  72<H>  /  ALT  36<H>  /  AlkPhos  115  05-01      Creatinine, Serum: 0.89 mg/dL (05-01-20 @ 05:20)  Creatinine, Serum: 0.68 mg/dL (04-30-20 @ 05:27)  Creatinine, Serum: 0.77 mg/dL (04-29-20 @ 07:05)  Creatinine, Serum: 0.84 mg/dL (04-27-20 @ 10:34)    C-Reactive Protein, Serum: 35.78 mg/dL (05-01-20 @ 05:20)  C-Reactive Protein, Serum: 9.44 mg/dL (04-29-20 @ 07:05)  C-Reactive Protein, Serum: 4.81 mg/dL (04-27-20 @ 10:34)    Procalcitonin, Serum: 7.69 ng/mL (04-30-20 @ 16:25)  Procalcitonin, Serum: 0.17 ng/mL (04-29-20 @ 07:05)  Procalcitonin, Serum: 0.18 ng/mL (04-27-20 @ 10:34)      COVID-19 PCR: Detected (04-27-20 @ 10:01)      < from: Xray Chest 1 View-PORTABLE IMMEDIATE (04.27.20 @ 10:49) >     EXAM:  XR CHEST PORTABLE IMMED 1V                          PROCEDURE DATE:  04/27/2020          INTERPRETATION:  Chest one view    HISTORY: Shortness of breath, cough and fever    COMPARISON STUDY: 5/16/2019    Frontal expiratory view of the chest shows the heart to be slightly enlarged in size. The lungs show small infiltrate along the left heart border and there is no evidence of pneumothorax nor pleural effusion.    IMPRESSION:  Small left infiltrate.    Thank you for the courtesy of this referral.        MAGDI RUSSO M.D., ATTENDING RADIOLOGIST  This document has been electronically signed. Apr 27 2020 10:55AM        < end of copied text >

## 2020-05-01 NOTE — DIETITIAN INITIAL EVALUATION ADULT. - PERTINENT LABORATORY DATA
05-01 Na150 mmol/L<H> Glu 114 mg/dL<H> K+ 3.3 mmol/L<L> Cr  0.89 mg/dL BUN 31.0 mg/dL<H> Phos n/a   Alb 2.9 g/dL<L> CRP 35.78

## 2020-05-01 NOTE — PROGRESS NOTE ADULT - ASSESSMENT
79 yo Frail elderly Female- PMH CVA Dementia [place]  +COVID PNA with possible superimposed  bacterial infection   Admitted for acute metabolic encephalopathy     #Acute Met Encephalopathy  Viral Infection  History of CVA and dementia, Baseline A/O x1 (self)   AMS and worsening functional status from baseline. (decreased PO intake, unable to ambulate, less verbal/responsive)  CT head noted with chronic changes / no acute changes.   Appreciate speech/swallow eval-recommend thin liquids    Hypoxia 2/2 COVID - L Lung Infiltrate   Supplemental nasal O2 as needed to maintain O2 sat>90% 2L NC O2 SAT 91%   Inflammatory markers   Treat symptomatically Fever Tylenol, Cough: Tessalon pearls and Robitussin prn   Prone position as tolerated    ID Consult and recommendations  Impression:  COVID-19 Pneumonia  Acute Hypoxic Respiratory failure  Starting empiric course of AZITHRO and CTX to treat possible secondary bacterial PNA   Defer convalescent plasma at this time   Elevated procalcitonin and WBC   continue to trend inflammatory markers (ESR, CRP, Ferritin, LDH,  procalcitonin)  q48h.  D dimer, lactate, troponin, CK, PT/PTT x 1   If procalcitonin starts to rise, may need to consider treating for secondary bacterial infections    History of Dementia  History of CVA and dementia, Baseline A/O x1 (self)  Namenda  Decreased PO intake, unable to ambulate, less verbal/responsive)   CT head noted with chronic changes / no acute changes.  Swallow Eval- rec thin liquids    GOC  Continue to treat Acute Infection  ID consulted see above  MOLST Directives DNR/DNI in place

## 2020-05-01 NOTE — PROGRESS NOTE ADULT - ASSESSMENT
80yF PMH of CVA, dementia, HLD, HTN presented to the ED for evaluation of weakness, lethargy and decreased PO intake. Admitted for acute metabolic encephalopathy 2/2 COVID.     #Acute Met Encephalopathy 2/2 viral infection most likely 2/2 covid.  w/u for other etiologies negative to date.  - History of CVA and dementia, Baseline A/O x1 (self)  - Per facility and , AMS and worsening functional status from baseline. (decreased PO intake, unable to ambulate, less verbal/responsive)  - CT head noted with chronic changes / no acute changes.  - Appreciate speech/swallow eval  - Appreciate palliative care input    #hypoxia 2/2 COVID -   - Supplemental O2 as needed to maintain O2 sat>90%  - repeat inflammatory markers   - tylenol, tessalon pearls, robitussin prn  - encourage prone position for 15mins qhr, incentive spirometer    #History of Dementia  - Namenda   - home med of Elelon non formulary, family to provide    #Hypertension  - c/w hydralazine     # DVT Prophylactic   - Lovenox SC     DNR/DNI

## 2020-05-01 NOTE — CONSULT NOTE ADULT - ASSESSMENT
This  80yF PMH of CVA, dementia, HLD, HTN presented to the ED for evaluation of weakness, lethargy and decreased PO intake. Unable to obtain history from patient as is poor historian, currently minimally responsive due to lethargy.  Baseline AAOx1 (self).  Per care manager at [facility] for past 24 hrs they noted pt has been increasing lethargic and needed extra assistance with ADL's. Normally requires 1 person to assist but currently required 3 person assist. Normally has good appetite, now not eating or drinking, requiring 1:1 assistance with feeds. Pt also normally verbal but has had AMS and minimally responsive for past 1-2 days. Pt with positive exposure to mulitple roomates with COVID-19. Pt at bedside states she feels weak and has mild SOB. (27 Apr 2020 13:26)    patient tested positive for COVID-19 PCR: Detected (27 Apr 2020 10:01)  She was maintained on nasal cannula since admission.  patient cannot offer any information.   X ray of the chest showed left side infiltrate.           Impression:  COVID-19 Pneumonia  acute hypoxic respiratory failure  cough  fevers  shortness of breath      Plan:   Continue supportive care measures  - continue Oxygenation    patient noted wtih elevated procalcitonin and WBC  - will start empiric course of AZITHRO and CTX to treat possible secondary bacterial PNA    continue to trend inflammatory markers.   If procalcitonin starts to rise, may need to consider treating for secondary bacterial infections    - trend CBC with diff, CMP with LFT's  - trend Inflammatory markers (ESR, CRP, Ferritin, LDH,  procalcitonin)  q48h.  D dimer, lactate, troponin, CK, PT/PTT x 1      Will follow with you. This  80yF PMH of CVA, dementia, HLD, HTN presented to the ED for evaluation of weakness, lethargy and decreased PO intake. Unable to obtain history from patient as is poor historian, currently minimally responsive due to lethargy.  Baseline AAOx1 (self).  Per care manager at [facility] for past 24 hrs they noted pt has been increasing lethargic and needed extra assistance with ADL's. Normally requires 1 person to assist but currently required 3 person assist. Normally has good appetite, now not eating or drinking, requiring 1:1 assistance with feeds. Pt also normally verbal but has had AMS and minimally responsive for past 1-2 days. Pt with positive exposure to mulitple roomates with COVID-19. Pt at bedside states she feels weak and has mild SOB. (27 Apr 2020 13:26)    patient tested positive for COVID-19 PCR: Detected (27 Apr 2020 10:01)  She was maintained on nasal cannula since admission.  patient cannot offer any information.   X ray of the chest showed left side infiltrate.       Impression:  COVID-19 Pneumonia  acute hypoxic respiratory failure  cough  fevers  shortness of breath      Plan:   Continue supportive care measures  - continue Oxygenation    - defer convalescent plasma at this time    patient noted with elevated procalcitonin and WBC  - will start empiric course of AZITHRO and CTX to treat possible secondary bacterial PNA    continue to trend inflammatory markers.   If procalcitonin starts to rise, may need to consider treating for secondary bacterial infections    - trend CBC with diff, CMP with LFT's  - trend Inflammatory markers (ESR, CRP, Ferritin, LDH,  procalcitonin)  q48h.  D dimer, lactate, troponin, CK, PT/PTT x 1      Will follow with you.

## 2020-05-02 LAB
ALBUMIN SERPL ELPH-MCNC: 3.1 G/DL — LOW (ref 3.3–5.2)
ALP SERPL-CCNC: 143 U/L — HIGH (ref 40–120)
ALT FLD-CCNC: 41 U/L — HIGH
ANION GAP SERPL CALC-SCNC: 18 MMOL/L — HIGH (ref 5–17)
ANION GAP SERPL CALC-SCNC: 19 MMOL/L — HIGH (ref 5–17)
APTT BLD: 64.5 SEC — HIGH (ref 27.5–36.3)
AST SERPL-CCNC: 82 U/L — HIGH
BILIRUB SERPL-MCNC: 0.8 MG/DL — SIGNIFICANT CHANGE UP (ref 0.4–2)
BUN SERPL-MCNC: 28 MG/DL — HIGH (ref 8–20)
BUN SERPL-MCNC: 31 MG/DL — HIGH (ref 8–20)
CALCIUM SERPL-MCNC: 9.1 MG/DL — SIGNIFICANT CHANGE UP (ref 8.6–10.2)
CALCIUM SERPL-MCNC: 9.2 MG/DL — SIGNIFICANT CHANGE UP (ref 8.6–10.2)
CHLORIDE SERPL-SCNC: 115 MMOL/L — HIGH (ref 98–107)
CHLORIDE SERPL-SCNC: 116 MMOL/L — HIGH (ref 98–107)
CK MB CFR SERPL CALC: 2.9 NG/ML — SIGNIFICANT CHANGE UP (ref 0–6.7)
CK SERPL-CCNC: 627 U/L — HIGH (ref 25–170)
CO2 SERPL-SCNC: 19 MMOL/L — LOW (ref 22–29)
CO2 SERPL-SCNC: 20 MMOL/L — LOW (ref 22–29)
CREAT SERPL-MCNC: 0.72 MG/DL — SIGNIFICANT CHANGE UP (ref 0.5–1.3)
CREAT SERPL-MCNC: 0.74 MG/DL — SIGNIFICANT CHANGE UP (ref 0.5–1.3)
D DIMER BLD IA.RAPID-MCNC: 792 NG/ML DDU — HIGH
GLUCOSE SERPL-MCNC: 119 MG/DL — HIGH (ref 70–99)
GLUCOSE SERPL-MCNC: 142 MG/DL — HIGH (ref 70–99)
HCT VFR BLD CALC: 46.1 % — HIGH (ref 34.5–45)
HGB BLD-MCNC: 14.5 G/DL — SIGNIFICANT CHANGE UP (ref 11.5–15.5)
INR BLD: 1.28 RATIO — HIGH (ref 0.88–1.16)
LDH SERPL L TO P-CCNC: 547 U/L — HIGH (ref 98–192)
MCHC RBC-ENTMCNC: 30.5 PG — SIGNIFICANT CHANGE UP (ref 27–34)
MCHC RBC-ENTMCNC: 31.5 GM/DL — LOW (ref 32–36)
MCV RBC AUTO: 96.8 FL — SIGNIFICANT CHANGE UP (ref 80–100)
PLATELET # BLD AUTO: 229 K/UL — SIGNIFICANT CHANGE UP (ref 150–400)
POTASSIUM SERPL-MCNC: 3.3 MMOL/L — LOW (ref 3.5–5.3)
POTASSIUM SERPL-MCNC: 3.9 MMOL/L — SIGNIFICANT CHANGE UP (ref 3.5–5.3)
POTASSIUM SERPL-SCNC: 3.3 MMOL/L — LOW (ref 3.5–5.3)
POTASSIUM SERPL-SCNC: 3.9 MMOL/L — SIGNIFICANT CHANGE UP (ref 3.5–5.3)
PROT SERPL-MCNC: 7.5 G/DL — SIGNIFICANT CHANGE UP (ref 6.6–8.7)
PROTHROM AB SERPL-ACNC: 14.6 SEC — HIGH (ref 10–12.9)
RBC # BLD: 4.76 M/UL — SIGNIFICANT CHANGE UP (ref 3.8–5.2)
RBC # FLD: 13.8 % — SIGNIFICANT CHANGE UP (ref 10.3–14.5)
SODIUM SERPL-SCNC: 152 MMOL/L — HIGH (ref 135–145)
SODIUM SERPL-SCNC: 154 MMOL/L — HIGH (ref 135–145)
TROPONIN T SERPL-MCNC: <0.01 NG/ML — SIGNIFICANT CHANGE UP (ref 0–0.06)
WBC # BLD: 10.33 K/UL — SIGNIFICANT CHANGE UP (ref 3.8–10.5)
WBC # FLD AUTO: 10.33 K/UL — SIGNIFICANT CHANGE UP (ref 3.8–10.5)

## 2020-05-02 PROCEDURE — 99233 SBSQ HOSP IP/OBS HIGH 50: CPT

## 2020-05-02 RX ORDER — POTASSIUM CHLORIDE 20 MEQ
40 PACKET (EA) ORAL EVERY 4 HOURS
Refills: 0 | Status: COMPLETED | OUTPATIENT
Start: 2020-05-02 | End: 2020-05-02

## 2020-05-02 RX ORDER — SODIUM CHLORIDE 9 MG/ML
2000 INJECTION, SOLUTION INTRAVENOUS
Refills: 0 | Status: DISCONTINUED | OUTPATIENT
Start: 2020-05-02 | End: 2020-05-03

## 2020-05-02 RX ORDER — POTASSIUM CHLORIDE 20 MEQ
40 PACKET (EA) ORAL EVERY 4 HOURS
Refills: 0 | Status: DISCONTINUED | OUTPATIENT
Start: 2020-05-02 | End: 2020-05-02

## 2020-05-02 RX ADMIN — Medication 40 MILLIEQUIVALENT(S): at 13:22

## 2020-05-02 RX ADMIN — Medication 5 MILLIGRAM(S): at 05:13

## 2020-05-02 RX ADMIN — ATORVASTATIN CALCIUM 10 MILLIGRAM(S): 80 TABLET, FILM COATED ORAL at 20:13

## 2020-05-02 RX ADMIN — Medication 5 MILLIGRAM(S): at 21:14

## 2020-05-02 RX ADMIN — SODIUM CHLORIDE 75 MILLILITER(S): 9 INJECTION, SOLUTION INTRAVENOUS at 16:32

## 2020-05-02 RX ADMIN — AZITHROMYCIN 250 MILLIGRAM(S): 500 TABLET, FILM COATED ORAL at 12:34

## 2020-05-02 RX ADMIN — ENOXAPARIN SODIUM 40 MILLIGRAM(S): 100 INJECTION SUBCUTANEOUS at 16:32

## 2020-05-02 RX ADMIN — Medication 5 MILLIGRAM(S): at 13:21

## 2020-05-02 RX ADMIN — Medication 40 MILLIEQUIVALENT(S): at 14:56

## 2020-05-02 RX ADMIN — CEFTRIAXONE 100 MILLIGRAM(S): 500 INJECTION, POWDER, FOR SOLUTION INTRAMUSCULAR; INTRAVENOUS at 13:21

## 2020-05-02 RX ADMIN — MEMANTINE HYDROCHLORIDE 10 MILLIGRAM(S): 10 TABLET ORAL at 20:13

## 2020-05-02 RX ADMIN — ENOXAPARIN SODIUM 40 MILLIGRAM(S): 100 INJECTION SUBCUTANEOUS at 05:13

## 2020-05-02 NOTE — PROGRESS NOTE ADULT - ASSESSMENT
80yF PMH of CVA, dementia, HLD, HTN presented to the ED for evaluation of weakness, lethargy and decreased PO intake. Admitted for acute metabolic encephalopathy 2/2 COVID.     #Acute Met Encephalopathy 2/2 viral infection most likely 2/2 covid.  w/u for other etiologies negative to date.  - History of CVA and dementia, Baseline A/O x1 (self)  - Per facility and , AMS and worsening functional status from baseline. (decreased PO intake, unable to ambulate, less verbal/responsive)  - CT head noted with chronic changes / no acute changes.  - Appreciate speech/swallow eval  - Appreciate palliative care input  - Elevated procalcitonin and WBC. Appreciate ID input. On empiric course of AZITHRO and CTX to treat possible secondary bacterial PNA    #hypoxia 2/2 COVID -   - Supplemental O2 as needed to maintain O2 sat>90%  - repeat inflammatory markers   - tylenol, tessalon pearls, robitussin prn  - encourage prone position, incentive spirometer    #History of Dementia  - Namenda   - home med of Atiya non formulary, family to provide    #Hypertension  - c/w hydralazine     # DVT Prophylactic   - Lovenox SC     DNR/DNI

## 2020-05-02 NOTE — CHART NOTE - NSCHARTNOTEFT_GEN_A_CORE
Spoke with attending on record in regards to electrolyte abnormalities noted on bmp  Potassium replaced PO  will start d5W at 75cc/hr x24 hours.   monitor bmp Q8

## 2020-05-02 NOTE — PROGRESS NOTE ADULT - SUBJECTIVE AND OBJECTIVE BOX
INTERVAL HISTORY/SUBJECTIVE:    Pt seen and evaluated at bedside. Afebrile. Continues to be lethargic. Appreciate ID and palliative care input.     MEDICATIONS  (STANDING):  aspirin enteric coated 81 milliGRAM(s) Oral daily  atorvastatin 10 milliGRAM(s) Oral at bedtime  azithromycin  IVPB      azithromycin  IVPB 250 milliGRAM(s) IV Intermittent every 24 hours  cefTRIAXone   IVPB 1000 milliGRAM(s) IV Intermittent every 24 hours  enoxaparin Injectable 40 milliGRAM(s) SubCutaneous every 12 hours  hydrALAZINE Injectable 5 milliGRAM(s) IV Push every 8 hours  memantine 10 milliGRAM(s) Oral <User Schedule>  memantine 5 milliGRAM(s) Oral <User Schedule>  sodium chloride 0.45% with potassium chloride 20 mEq/L 500 milliLiter(s) (80 mL/Hr) IV Continuous <Continuous>    MEDICATIONS  (PRN):  acetaminophen  Suppository .. 650 milliGRAM(s) Rectal every 6 hours PRN Temp greater or equal to 38C (100.4F), Mild Pain (1 - 3)  ALBUTerol    90 MICROgram(s) HFA Inhaler 2 Puff(s) Inhalation every 4 hours PRN Shortness of Breath and/or Wheezing  guaifenesin/dextromethorphan  Syrup 10 milliLiter(s) Oral every 4 hours PRN Cough  haloperidol    Injectable 1 milliGRAM(s) IntraMuscular every 12 hours PRN For Sundowning or Agitation    Vital Signs Last 24 Hrs  T(C): 36.4 (02 May 2020 07:41), Max: 36.8 (02 May 2020 05:07)  T(F): 97.5 (02 May 2020 07:41), Max: 98.2 (02 May 2020 05:07)  HR: 86 (02 May 2020 07:41) (79 - 96)  BP: 137/79 (02 May 2020 07:41) (127/80 - 145/79)  BP(mean): --  RR: 18 (02 May 2020 07:41) (18 - 20)  SpO2: 92% (02 May 2020 07:41) (90% - 94%)    LABS:    CBC Full  -  ( 01 May 2020 05:20 )  WBC Count : 12.94 K/uL  RBC Count : 4.86 M/uL  Hemoglobin : 14.8 g/dL  Hematocrit : 46.4 %  Platelet Count - Automated : 208 K/uL  Mean Cell Volume : 95.5 fl  Mean Cell Hemoglobin : 30.5 pg  Mean Cell Hemoglobin Concentration : 31.9 gm/dL  Auto Neutrophil # : x  Auto Lymphocyte # : x  Auto Monocyte # : x  Auto Eosinophil # : x  Auto Basophil # : x  Auto Neutrophil % : x  Auto Lymphocyte % : x  Auto Monocyte % : x  Auto Eosinophil % : x  Auto Basophil % : x

## 2020-05-02 NOTE — PROVIDER CONTACT NOTE (OTHER) - SITUATION
as per sp/sw therapist, pt should receive thin liquids only (no jello, applesauce, etc.), also, PO meds are a concern
Lab Na 154, K 3.3, on assessment pt poor po intake

## 2020-05-02 NOTE — PROVIDER CONTACT NOTE (OTHER) - ASSESSMENT
pt had trouble swallowing crushed meds in applesauce this shift with RN
AXo X1 at baseline. Poor PO intake.   VSS. requiring o2 supplement 4 lit NC.   Will continue to monitor.

## 2020-05-02 NOTE — PROVIDER CONTACT NOTE (OTHER) - ACTION/TREATMENT ORDERED:
will contact attending and/or handle orders to be changed himself
Replace Lytes.  Initiated IVF D5%  Speech and swallow RE- evaluation

## 2020-05-03 PROCEDURE — 99233 SBSQ HOSP IP/OBS HIGH 50: CPT

## 2020-05-03 RX ORDER — SODIUM CHLORIDE 9 MG/ML
1000 INJECTION, SOLUTION INTRAVENOUS
Refills: 0 | Status: DISCONTINUED | OUTPATIENT
Start: 2020-05-03 | End: 2020-05-07

## 2020-05-03 RX ADMIN — AZITHROMYCIN 250 MILLIGRAM(S): 500 TABLET, FILM COATED ORAL at 11:48

## 2020-05-03 RX ADMIN — ENOXAPARIN SODIUM 40 MILLIGRAM(S): 100 INJECTION SUBCUTANEOUS at 17:24

## 2020-05-03 RX ADMIN — SODIUM CHLORIDE 100 MILLILITER(S): 9 INJECTION, SOLUTION INTRAVENOUS at 16:52

## 2020-05-03 RX ADMIN — Medication 5 MILLIGRAM(S): at 04:38

## 2020-05-03 RX ADMIN — Medication 5 MILLIGRAM(S): at 21:12

## 2020-05-03 RX ADMIN — ENOXAPARIN SODIUM 40 MILLIGRAM(S): 100 INJECTION SUBCUTANEOUS at 04:38

## 2020-05-03 RX ADMIN — Medication 5 MILLIGRAM(S): at 12:15

## 2020-05-03 RX ADMIN — CEFTRIAXONE 100 MILLIGRAM(S): 500 INJECTION, POWDER, FOR SOLUTION INTRAMUSCULAR; INTRAVENOUS at 11:50

## 2020-05-03 NOTE — PROGRESS NOTE ADULT - SUBJECTIVE AND OBJECTIVE BOX
INTERVAL HPI/OVERNIGHT EVENTS:  Pt seen and evaluated at bedside. Afebrile. Continues to be lethargic - mumbles one-word answers at times that are impossible to understand. Appreciate ID and palliative care input.  Pt found satting 88-89% on 4L NC.  Increased to 6L and SpO2 improved to 92%.    MEDICATIONS  (STANDING):  aspirin enteric coated 81 milliGRAM(s) Oral daily  atorvastatin 10 milliGRAM(s) Oral at bedtime  azithromycin  IVPB      azithromycin  IVPB 250 milliGRAM(s) IV Intermittent every 24 hours  cefTRIAXone   IVPB 1000 milliGRAM(s) IV Intermittent every 24 hours  dextrose 5%. 2000 milliLiter(s) (75 mL/Hr) IV Continuous <Continuous>  enoxaparin Injectable 40 milliGRAM(s) SubCutaneous every 12 hours  hydrALAZINE Injectable 5 milliGRAM(s) IV Push every 8 hours  memantine 10 milliGRAM(s) Oral <User Schedule>  memantine 5 milliGRAM(s) Oral <User Schedule>    MEDICATIONS  (PRN):  acetaminophen  Suppository .. 650 milliGRAM(s) Rectal every 6 hours PRN Temp greater or equal to 38C (100.4F), Mild Pain (1 - 3)  ALBUTerol    90 MICROgram(s) HFA Inhaler 2 Puff(s) Inhalation every 4 hours PRN Shortness of Breath and/or Wheezing  guaifenesin/dextromethorphan  Syrup 10 milliLiter(s) Oral every 4 hours PRN Cough  haloperidol    Injectable 1 milliGRAM(s) IntraMuscular every 12 hours PRN For Sundowning or Agitation      Allergies    No Known Allergies      Vital Signs Last 24 Hrs  T(C): 36.4 (03 May 2020 09:27), Max: 36.8 (02 May 2020 17:24)  T(F): 97.6 (03 May 2020 09:27), Max: 98.3 (02 May 2020 17:24)  HR: 72 (03 May 2020 12:14) (71 - 87)  BP: 146/67 (03 May 2020 12:14) (131/67 - 147/77)  BP(mean): --  RR: 20 (03 May 2020 09:27) (19 - 20)  SpO2: 87% (03 May 2020 09:27) (87% - 93%)      LABS:                        14.5   10.33 )-----------( 229      ( 02 May 2020 14:17 )             46.1     05-02    152<H>  |  115<H>  |  28.0<H>  ----------------------------<  142<H>  3.9   |  20.0<L>  |  0.72    Ca    9.1      02 May 2020 18:58    TPro  7.5  /  Alb  3.1<L>  /  TBili  0.8  /  DBili  x   /  AST  82<H>  /  ALT  41<H>  /  AlkPhos  143<H>  05-02    PT/INR - ( 02 May 2020 11:30 )   PT: 14.6 sec;   INR: 1.28 ratio         PTT - ( 02 May 2020 11:30 )  PTT:64.5 sec        RADIOLOGY & ADDITIONAL TESTS:

## 2020-05-03 NOTE — PROGRESS NOTE ADULT - ASSESSMENT
80yF PMH of CVA, dementia, HLD, HTN presented to the ED for evaluation of weakness, lethargy and decreased PO intake. Admitted for acute metabolic encephalopathy 2/2 COVID.     #Acute Met Encephalopathy 2/2 viral infection most likely 2/2 covid.  w/u for other etiologies negative to date.  - History of CVA and dementia, Baseline A/O x1 (self)  - Per facility and , AMS and worsening functional status from baseline. (decreased PO intake, unable to ambulate, less verbal/responsive)  - CT head noted with chronic changes / no acute changes.  - Appreciate speech/swallow eval  - Appreciate palliative care input  - Elevated procalcitonin and WBC. Appreciate ID input. On empiric course of AZITHRO and CTX to treat possible secondary bacterial PNA    #hypoxia 2/2 COVID -   - Supplemental O2 as needed to maintain O2 sat>90%  - Continue to monitor inflammatory markers   - Tylenol, tessalon pearls, robitussin prn  - encourage prone position, incentive spirometer    #History of Dementia  - Namenda   - home med of Atiya non formulary, family to provide    #Hypertension  - c/w hydralazine     # DVT Prophylactic   - Lovenox SC     DNR/DNI

## 2020-05-04 LAB
ALBUMIN SERPL ELPH-MCNC: 2.6 G/DL — LOW (ref 3.3–5.2)
ALP SERPL-CCNC: 173 U/L — HIGH (ref 40–120)
ALT FLD-CCNC: 56 U/L — HIGH
ANION GAP SERPL CALC-SCNC: 20 MMOL/L — HIGH (ref 5–17)
APPEARANCE UR: CLEAR — SIGNIFICANT CHANGE UP
AST SERPL-CCNC: 83 U/L — HIGH
BACTERIA # UR AUTO: NEGATIVE — SIGNIFICANT CHANGE UP
BILIRUB SERPL-MCNC: 0.9 MG/DL — SIGNIFICANT CHANGE UP (ref 0.4–2)
BILIRUB UR-MCNC: NEGATIVE — SIGNIFICANT CHANGE UP
BUN SERPL-MCNC: 13 MG/DL — SIGNIFICANT CHANGE UP (ref 8–20)
CALCIUM SERPL-MCNC: 8.6 MG/DL — SIGNIFICANT CHANGE UP (ref 8.6–10.2)
CHLORIDE SERPL-SCNC: 102 MMOL/L — SIGNIFICANT CHANGE UP (ref 98–107)
CO2 SERPL-SCNC: 16 MMOL/L — LOW (ref 22–29)
COLOR SPEC: YELLOW — SIGNIFICANT CHANGE UP
CREAT SERPL-MCNC: 0.48 MG/DL — LOW (ref 0.5–1.3)
CRP SERPL-MCNC: 6.38 MG/DL — HIGH (ref 0–0.4)
DIFF PNL FLD: ABNORMAL
EPI CELLS # UR: SIGNIFICANT CHANGE UP
FERRITIN SERPL-MCNC: 2415 NG/ML — HIGH (ref 15–150)
GLUCOSE SERPL-MCNC: 137 MG/DL — HIGH (ref 70–99)
GLUCOSE UR QL: NEGATIVE MG/DL — SIGNIFICANT CHANGE UP
HCT VFR BLD CALC: 40.8 % — SIGNIFICANT CHANGE UP (ref 34.5–45)
HGB BLD-MCNC: 13.3 G/DL — SIGNIFICANT CHANGE UP (ref 11.5–15.5)
KETONES UR-MCNC: NEGATIVE — SIGNIFICANT CHANGE UP
LDH SERPL L TO P-CCNC: 611 U/L — HIGH (ref 98–192)
LEUKOCYTE ESTERASE UR-ACNC: ABNORMAL
MCHC RBC-ENTMCNC: 30.7 PG — SIGNIFICANT CHANGE UP (ref 27–34)
MCHC RBC-ENTMCNC: 32.6 GM/DL — SIGNIFICANT CHANGE UP (ref 32–36)
MCV RBC AUTO: 94.2 FL — SIGNIFICANT CHANGE UP (ref 80–100)
NITRITE UR-MCNC: NEGATIVE — SIGNIFICANT CHANGE UP
PH UR: 6.5 — SIGNIFICANT CHANGE UP (ref 5–8)
PLATELET # BLD AUTO: 256 K/UL — SIGNIFICANT CHANGE UP (ref 150–400)
POTASSIUM SERPL-MCNC: 3.1 MMOL/L — LOW (ref 3.5–5.3)
POTASSIUM SERPL-SCNC: 3.1 MMOL/L — LOW (ref 3.5–5.3)
PROCALCITONIN SERPL-MCNC: 0.83 NG/ML — HIGH (ref 0.02–0.1)
PROT SERPL-MCNC: 6.7 G/DL — SIGNIFICANT CHANGE UP (ref 6.6–8.7)
PROT UR-MCNC: 30 MG/DL
RBC # BLD: 4.33 M/UL — SIGNIFICANT CHANGE UP (ref 3.8–5.2)
RBC # FLD: 13.3 % — SIGNIFICANT CHANGE UP (ref 10.3–14.5)
RBC CASTS # UR COMP ASSIST: ABNORMAL /HPF (ref 0–4)
SODIUM SERPL-SCNC: 137 MMOL/L — SIGNIFICANT CHANGE UP (ref 135–145)
SP GR SPEC: 1 — LOW (ref 1.01–1.02)
UROBILINOGEN FLD QL: NEGATIVE MG/DL — SIGNIFICANT CHANGE UP
WBC # BLD: 10.45 K/UL — SIGNIFICANT CHANGE UP (ref 3.8–10.5)
WBC # FLD AUTO: 10.45 K/UL — SIGNIFICANT CHANGE UP (ref 3.8–10.5)
WBC UR QL: SIGNIFICANT CHANGE UP

## 2020-05-04 PROCEDURE — 99233 SBSQ HOSP IP/OBS HIGH 50: CPT

## 2020-05-04 PROCEDURE — 99231 SBSQ HOSP IP/OBS SF/LOW 25: CPT

## 2020-05-04 PROCEDURE — 99232 SBSQ HOSP IP/OBS MODERATE 35: CPT

## 2020-05-04 RX ORDER — POTASSIUM CHLORIDE 20 MEQ
10 PACKET (EA) ORAL
Refills: 0 | Status: COMPLETED | OUTPATIENT
Start: 2020-05-04 | End: 2020-05-04

## 2020-05-04 RX ORDER — ASCORBIC ACID 60 MG
500 TABLET,CHEWABLE ORAL DAILY
Refills: 0 | Status: DISCONTINUED | OUTPATIENT
Start: 2020-05-04 | End: 2020-05-07

## 2020-05-04 RX ORDER — MORPHINE SULFATE 50 MG/1
1 CAPSULE, EXTENDED RELEASE ORAL ONCE
Refills: 0 | Status: DISCONTINUED | OUTPATIENT
Start: 2020-05-04 | End: 2020-05-04

## 2020-05-04 RX ADMIN — ENOXAPARIN SODIUM 40 MILLIGRAM(S): 100 INJECTION SUBCUTANEOUS at 16:28

## 2020-05-04 RX ADMIN — Medication 5 MILLIGRAM(S): at 22:00

## 2020-05-04 RX ADMIN — AZITHROMYCIN 250 MILLIGRAM(S): 500 TABLET, FILM COATED ORAL at 11:44

## 2020-05-04 RX ADMIN — Medication 100 MILLIEQUIVALENT(S): at 13:06

## 2020-05-04 RX ADMIN — ENOXAPARIN SODIUM 40 MILLIGRAM(S): 100 INJECTION SUBCUTANEOUS at 04:11

## 2020-05-04 RX ADMIN — Medication 5 MILLIGRAM(S): at 04:12

## 2020-05-04 RX ADMIN — Medication 5 MILLIGRAM(S): at 11:45

## 2020-05-04 RX ADMIN — SODIUM CHLORIDE 100 MILLILITER(S): 9 INJECTION, SOLUTION INTRAVENOUS at 11:27

## 2020-05-04 RX ADMIN — MORPHINE SULFATE 1 MILLIGRAM(S): 50 CAPSULE, EXTENDED RELEASE ORAL at 13:06

## 2020-05-04 RX ADMIN — CEFTRIAXONE 100 MILLIGRAM(S): 500 INJECTION, POWDER, FOR SOLUTION INTRAMUSCULAR; INTRAVENOUS at 11:31

## 2020-05-04 RX ADMIN — Medication 100 MILLIEQUIVALENT(S): at 16:25

## 2020-05-04 RX ADMIN — Medication 100 MILLIEQUIVALENT(S): at 14:25

## 2020-05-04 NOTE — PROGRESS NOTE ADULT - SUBJECTIVE AND OBJECTIVE BOX
INTERVAL HPI/OVERNIGHT EVENTS: 81 yo Frail elderly Female Patient PMH of Dementia-transferred from [facility] with AMS- +COVID PNA and Hypoxic  F/U Note  Still lethargic and weak-opening her eyes when name called  Not able to increase her activity,  Oxygenation needs have increased. (CO2-<16)  Titrated to Venturi Mask 50% 90% O2SAT and supplemental O2 NC  Proning provides better oxygenation  Not eating solid food, had been drinking fluids when offered Albumin 2.6     80y old  Female who presents with a chief complaint of AMS/lethargy (04 May 2020 11:56)    Present Symptoms:     Dyspnea:  2-3    Nausea/Vomiting:  No  Anxiety:   No  Depression:  No  Fatigue: Yes   Loss of appetite: Yes     Pain: No pain behaviors observed            Character-            Duration-            Effect-            Factors-            Frequency-            Location-            Severity-    Review of Systems: Reviewed                    Unable to obtain due to poor mentation       MEDICATIONS  (STANDING):  ascorbic acid 500 milliGRAM(s) Oral daily  aspirin enteric coated 81 milliGRAM(s) Oral daily  atorvastatin 10 milliGRAM(s) Oral at bedtime  azithromycin  IVPB      azithromycin  IVPB 250 milliGRAM(s) IV Intermittent every 24 hours  cefTRIAXone   IVPB 1000 milliGRAM(s) IV Intermittent every 24 hours  dextrose 5%. 1000 milliLiter(s) (100 mL/Hr) IV Continuous <Continuous>  enoxaparin Injectable 40 milliGRAM(s) SubCutaneous every 12 hours  hydrALAZINE Injectable 5 milliGRAM(s) IV Push every 8 hours  memantine 10 milliGRAM(s) Oral <User Schedule>  memantine 5 milliGRAM(s) Oral <User Schedule>  multivitamin  Chewable 1 Tablet(s) Oral daily  potassium chloride  10 mEq/100 mL IVPB 10 milliEquivalent(s) IV Intermittent every 1 hour    MEDICATIONS  (PRN):  acetaminophen  Suppository .. 650 milliGRAM(s) Rectal every 6 hours PRN Temp greater or equal to 38C (100.4F), Mild Pain (1 - 3)  ALBUTerol    90 MICROgram(s) HFA Inhaler 2 Puff(s) Inhalation every 4 hours PRN Shortness of Breath and/or Wheezing  guaifenesin/dextromethorphan  Syrup 10 milliLiter(s) Oral every 4 hours PRN Cough  haloperidol    Injectable 1 milliGRAM(s) IntraMuscular every 12 hours PRN For Sundowning or Agitation      PHYSICAL EXAM: Due to the nature of this patient's COVID-19 isolation status, no bedside physical exam was done to limit spread of infection. Examination highlights were provided by bedside nurse and primary team. Objective data were reviewed in detail.    LABS:                        13.3   10.45 )-----------( 256      ( 04 May 2020 09:06 )             40.8     05-04    137  |  102  |  13.0  ----------------------------<  137<H>  3.1<L>   |  16.0<L>  |  0.48<L>    Ca    8.6      04 May 2020 09:06    TPro  6.7  /  Alb  2.6<L>  /  TBili  0.9  /  DBili  x   /  AST  83<H>  /  ALT  56<H>  /  AlkPhos  173<H>  05-04        I&O's Summary    03 May 2020 07:01  -  04 May 2020 07:00  --------------------------------------------------------  IN: 100 mL / OUT: 0 mL / NET: 100 mL    04 May 2020 07:01  -  04 May 2020 12:36  --------------------------------------------------------  IN: 700 mL / OUT: 300 mL / NET: 400 mL    RADIOLOGY & ADDITIONAL STUDIES:    ADVANCE DIRECTIVES:   DNR YES   Completed on:                     MOLST  YES    Completed on:  Living Will NO   Completed on:

## 2020-05-04 NOTE — CHART NOTE - NSCHARTNOTEFT_GEN_A_CORE
Upon Nutritional Assessment by the Registered Dietitian your patient was determined to meet criteria / has evidence of the following diagnosis/diagnoses:              [ x]  Moderate Protein Calorie Malnutrition          Findings as based on:  •  Comprehensive nutrition assessment and consultation  •  Calorie counts (nutrient intake analysis)  •  Food acceptance and intake status from observations by staff  •  Follow up  •  Patient education  •  Intervention secondary to interdisciplinary rounds  •   concerns      Treatment:    The following diet has been recommended:      PROVIDER Section:     By signing this assessment you are acknowledging and agree with the diagnosis/diagnoses assigned by the Registered Dietitian    Comments:    RX: Continue Ensure Enlive TID to optimize po intake and provide an additional 350kcal, 20g protein per serving   RX: MVI and Vitamin C (500mg) daily

## 2020-05-04 NOTE — PROGRESS NOTE ADULT - ASSESSMENT
This  80yF PMH of CVA, dementia, HLD, HTN presented to the ED for evaluation of weakness, lethargy and decreased PO intake. Unable to obtain history from patient as is poor historian, currently minimally responsive due to lethargy.  Baseline AAOx1 (self).  Per care manager at [facility] for past 24 hrs they noted pt has been increasing lethargic and needed extra assistance with ADL's. Normally requires 1 person to assist but currently required 3 person assist. Normally has good appetite, now not eating or drinking, requiring 1:1 assistance with feeds. Pt also normally verbal but has had AMS and minimally responsive for past 1-2 days. Pt with positive exposure to mulitple roomates with COVID-19. Pt at bedside states she feels weak and has mild SOB. (27 Apr 2020 13:26)    patient tested positive for COVID-19 PCR: Detected (27 Apr 2020 10:01)  She was maintained on nasal cannula since admission.  patient cannot offer any information.   X ray of the chest showed left side infiltrate.       Impression:  COVID-19 Pneumonia  acute hypoxic respiratory failure  cough  fevers  shortness of breath      Plan:   Continue supportive care measures  - continue Oxygenation    INFLAMM markers coming down    patient noted with elevated procalcitonin and WBC    continue AZITHRO and CTX to treat possible secondary bacterial PNA  procalcitonin coming down    - trend CBC with diff, CMP with LFT's  - trend Inflammatory markers (ESR, CRP, Ferritin, LDH,  procalcitonin)  q48h.  D dimer, lactate, troponin, CK, PT/PTT x 1      Will follow with you.

## 2020-05-04 NOTE — PROGRESS NOTE ADULT - SUBJECTIVE AND OBJECTIVE BOX
INTERVAL HPI/OVERNIGHT EVENTS:  Pt seen and evaluated at bedside. Afebrile. Somewhat more alert today - pt able to provide her first name.  Had been requiring Venturi mask, though now satting 93% on 5L NC.     MEDICATIONS  (STANDING):  ascorbic acid 500 milliGRAM(s) Oral daily  aspirin enteric coated 81 milliGRAM(s) Oral daily  atorvastatin 10 milliGRAM(s) Oral at bedtime  azithromycin  IVPB      azithromycin  IVPB 250 milliGRAM(s) IV Intermittent every 24 hours  cefTRIAXone   IVPB 1000 milliGRAM(s) IV Intermittent every 24 hours  dextrose 5%. 1000 milliLiter(s) (100 mL/Hr) IV Continuous <Continuous>  enoxaparin Injectable 40 milliGRAM(s) SubCutaneous every 12 hours  hydrALAZINE Injectable 5 milliGRAM(s) IV Push every 8 hours  memantine 10 milliGRAM(s) Oral <User Schedule>  memantine 5 milliGRAM(s) Oral <User Schedule>  multivitamin  Chewable 1 Tablet(s) Oral daily    MEDICATIONS  (PRN):  acetaminophen  Suppository .. 650 milliGRAM(s) Rectal every 6 hours PRN Temp greater or equal to 38C (100.4F), Mild Pain (1 - 3)  ALBUTerol    90 MICROgram(s) HFA Inhaler 2 Puff(s) Inhalation every 4 hours PRN Shortness of Breath and/or Wheezing  guaifenesin/dextromethorphan  Syrup 10 milliLiter(s) Oral every 4 hours PRN Cough  haloperidol    Injectable 1 milliGRAM(s) IntraMuscular every 12 hours PRN For Sundowning or Agitation      Allergies    No Known Allergies        Vital Signs Last 24 Hrs  T(C): 35.7 (04 May 2020 07:38), Max: 36.9 (04 May 2020 05:41)  T(F): 96.2 (04 May 2020 07:38), Max: 98.5 (04 May 2020 05:41)  HR: 83 (04 May 2020 07:38) (72 - 84)  BP: 116/75 (04 May 2020 11:45) (95/55 - 147/86)  BP(mean): 929 (04 May 2020 05:41) (100 - 929)  RR: 18 (04 May 2020 07:38) (18 - 20)  SpO2: 93% (04 May 2020 11:45) (90% - 93%)          LABS:                        13.3   10.45 )-----------( 256      ( 04 May 2020 09:06 )             40.8     05-04    137  |  102  |  13.0  ----------------------------<  137<H>  3.1<L>   |  16.0<L>  |  0.48<L>    Ca    8.6      04 May 2020 09:06    TPro  6.7  /  Alb  2.6<L>  /  TBili  0.9  /  DBili  x   /  AST  83<H>  /  ALT  56<H>  /  AlkPhos  173<H>  05-04            RADIOLOGY & ADDITIONAL TESTS:

## 2020-05-04 NOTE — PROGRESS NOTE ADULT - ASSESSMENT
81 yo F with Dementia-admitted with + COVID 19+ PNA and Hypoxia    COVID 19 + PNA    More responsive to verbal stimuli-but is nonverbal   Hypoxia- PUlse OXimetry < to 90% on Venturi Mask  SOB- Tachypnea- add low dose Roxanol for dyspnea prn  O2 NC 6L added for supplemental support  Proning as tolerated by patient    Acute Metabolic Encephalopathy  Lethargic Liver enzymes increasing  Hypoxia worsening  Oxygen needs also increasing    GOC  Keep line of communication open with  Maurisio  I called him early this afternoon, gave him an update  Prognosis guarded-trying to prepare  for the worst-still hopeful  [facility]

## 2020-05-04 NOTE — PROGRESS NOTE ADULT - ASSESSMENT
80yF PMH of CVA, dementia, HLD, HTN presented to the ED for evaluation of weakness, lethargy and decreased PO intake. Admitted for acute metabolic encephalopathy 2/2 COVID.     #Acute Met Encephalopathy 2/2 viral infection most likely 2/2 covid.  w/u for other etiologies negative to date.  Slightly more alert today.  - History of CVA and dementia, Baseline A/O x1 (self)  - Per facility and , AMS and worsening functional status from baseline. (decreased PO intake, unable to ambulate, less verbal/responsive)  - CT head noted with chronic changes / no acute changes.  - Appreciate speech/swallow eval  - Appreciate palliative care input  - On empiric course of AZITHRO and CTX to treat possible secondary bacterial PNA.  WBC's, inflammatory markers, procalcitonin all improving.    #hypoxia 2/2 COVID - had been requiring Venturi mask, but now stable on 5L NC.  - Supplemental O2 as needed to maintain O2 sat>90%  - Continue to monitor inflammatory markers   - Tylenol, tessalon pearls, robitussin prn  - encourage prone position, incentive spirometer    #History of Dementia  - Namenda   - home med of [place] non formulary, family to provide    #Hypertension  - c/w hydralazine     Hyponatremia:  resolved  - Continue to monitor    Hypokalemia:  - Replete K+.  - Continue to monitor.    # DVT Prophylactic   - Lovenox SC     DNR/DNI

## 2020-05-04 NOTE — CHART NOTE - NSCHARTNOTEFT_GEN_A_CORE
Source: Patient [ ]  Family [ ]   other [x ]    Current Diet: Diet, Clear Liquid:   Supplement Feeding Modality:  Oral  Ensure Clear Cans or Servings Per Day:  3       Frequency:  Three Times a day (05-03-20 @ 12:39)    PO intake: Pt has poor po intake    Current Weight:   (4/27) 184.9#  -Obtain current wt, continue to monitor. b/l ankle, b/l leg 1+ edema noted.    Pertinent Medications: MEDICATIONS  (STANDING):  aspirin enteric coated 81 milliGRAM(s) Oral daily  atorvastatin 10 milliGRAM(s) Oral at bedtime  azithromycin  IVPB      azithromycin  IVPB 250 milliGRAM(s) IV Intermittent every 24 hours  cefTRIAXone   IVPB 1000 milliGRAM(s) IV Intermittent every 24 hours  dextrose 5%. 1000 milliLiter(s) (100 mL/Hr) IV Continuous <Continuous>  enoxaparin Injectable 40 milliGRAM(s) SubCutaneous every 12 hours  hydrALAZINE Injectable 5 milliGRAM(s) IV Push every 8 hours  memantine 10 milliGRAM(s) Oral <User Schedule>  memantine 5 milliGRAM(s) Oral <User Schedule>    MEDICATIONS  (PRN):  acetaminophen  Suppository .. 650 milliGRAM(s) Rectal every 6 hours PRN Temp greater or equal to 38C (100.4F), Mild Pain (1 - 3)  ALBUTerol    90 MICROgram(s) HFA Inhaler 2 Puff(s) Inhalation every 4 hours PRN Shortness of Breath and/or Wheezing  guaifenesin/dextromethorphan  Syrup 10 milliLiter(s) Oral every 4 hours PRN Cough  haloperidol    Injectable 1 milliGRAM(s) IntraMuscular every 12 hours PRN For Sundowning or Agitation    Pertinent Labs: CBC Full  -  ( 02 May 2020 14:17 )  WBC Count : 10.33 K/uL  RBC Count : 4.76 M/uL  Hemoglobin : 14.5 g/dL  Hematocrit : 46.1 %  Platelet Count - Automated : 229 K/uL  Mean Cell Volume : 96.8 fl  Mean Cell Hemoglobin : 30.5 pg  Mean Cell Hemoglobin Concentration : 31.5 gm/dL  Auto Neutrophil # : x  Auto Lymphocyte # : x  Auto Monocyte # : x  Auto Eosinophil # : x  Auto Basophil # : x  Auto Neutrophil % : x  Auto Lymphocyte % : x  Auto Monocyte % : x  Auto Eosinophil % : x  Auto Basophil % : x    -No recent labs    Skin: Intact per documentation     Nutrition focused physical exam not conducted - found signs of malnutrition [ ]absent [ ]present    Subcutaneous fat loss: [ ] Orbital fat pads region, [ ]Buccal fat region, [ ]Triceps region,  [ ]Ribs region    Muscle wasting: [ ]Temples region, [ ]Clavicle region, [ ]Shoulder region, [ ]Scapula region, [ ]Interosseous region,  [ ]thigh region, [ ]Calf region    Estimated Needs:   [ x] no change since previous assessment  [ ] recalculated:     Current Nutrition Diagnosis: Pt remains at high nutrition risk secondary to increased Nutrient Needs related to increased physiologic demand with healing as evidenced by pt with acute hypoxic respiratory failure due to COVID-19, poor po intake. Pt continues to have suboptimal po intake, consuming 0% of CLD. Nursing documented poor po intake. Last documented BM 5/2. Aware palliative following. RD to remain available.     Pt meets criteria for moderate (acute) malnutrition related to inability to meet sufficient protein-energy in setting of poor po intake 2/2 lethargy and weakness and +COVID19 as evidenced by pt meeting <75% estimated energy needs >7 days and 1+ edema.     Recommendations:   1) Continue Ensure Enlive TID to optimize po intake and provide an additional 350kcal, 20g protein per serving   2) RX: MVI and Vitamin C (500mg)  3) Encourage po intake  4) Monitor wts and labs    Monitoring and Evaluation:   [ x] PO intake [ x] Tolerance to diet prescription [X] Weights  [X] Follow up per protocol [X] Labs:

## 2020-05-04 NOTE — PROGRESS NOTE ADULT - SUBJECTIVE AND OBJECTIVE BOX
[facility]  INFECTIOUS DISEASES AND INTERNAL MEDICINE at [place]  =======================================================  Ag Cobos MD  Diplomates American Board of Internal Medicine and Infectious Diseases  Telephone [phone]  Fax            [phone]  =======================================================    [id number]  FLORIDALMA GILES   follow up:  COVID-19 pneumonia     on semi-prone today     =======================================================  REVIEW OF SYSTEMS:  mental status limits exam  =======================================================  Allergies  No Known Allergies    ======================================================  Physical Exam:  ============  (see vitals section below)    General:  No acute distress.  Eye: Pupils are equal, round and reactive to light, Extraocular movements are intact, Normal conjunctiva.  HENT: Normocephalic, Oral mucosa is moist, No pharyngeal erythema, No sinus tenderness.  NASAL cannula in place.   Neck: Supple, No lymphadenopathy.  Respiratory: Lungs  with SCATTERED RHONCHI  Cardiovascular: Normal rate, Regular rhythm,   Gastrointestinal: Soft, Non-tender, Non-distended, Normal bowel sounds.  Genitourinary: No costovertebral angle tenderness.  Lymphatics: No lymphadenopathy neck,   Musculoskeletal: Normal range of motion, Normal strength.  Integumentary: No rash.  Neurologic:  awake, minimally verbal      =======================================================  Vitals:  ============  T(F): 96.2 (04 May 2020 07:38), Max: 98.5 (04 May 2020 05:41)  HR: 83 (04 May 2020 07:38)  BP: 116/75 (04 May 2020 11:45)  RR: 18 (04 May 2020 07:38)  SpO2: 93% (04 May 2020 11:45) (90% - 93%)  temp max in last 48H T(F): , Max: 98.5 (05-04-20 @ 05:41)    =======================================================  Current Antibiotics:  azithromycin  IVPB      azithromycin  IVPB 250 milliGRAM(s) IV Intermittent every 24 hours  cefTRIAXone   IVPB 1000 milliGRAM(s) IV Intermittent every 24 hours    Other medications:  ascorbic acid 500 milliGRAM(s) Oral daily  aspirin enteric coated 81 milliGRAM(s) Oral daily  atorvastatin 10 milliGRAM(s) Oral at bedtime  dextrose 5%. 1000 milliLiter(s) IV Continuous <Continuous>  enoxaparin Injectable 40 milliGRAM(s) SubCutaneous every 12 hours  hydrALAZINE Injectable 5 milliGRAM(s) IV Push every 8 hours  memantine 10 milliGRAM(s) Oral <User Schedule>  memantine 5 milliGRAM(s) Oral <User Schedule>  multivitamin  Chewable 1 Tablet(s) Oral daily  potassium chloride  10 mEq/100 mL IVPB 10 milliEquivalent(s) IV Intermittent every 1 hour      =======================================================  Labs:                        13.3   10.45 )-----------( 256      ( 04 May 2020 09:06 )             40.8      05-04    137  |  102  |  13.0  ----------------------------<  137<H>  3.1<L>   |  16.0<L>  |  0.48<L>    Ca    8.6      04 May 2020 09:06    TPro  6.7  /  Alb  2.6<L>  /  TBili  0.9  /  DBili  x   /  AST  83<H>  /  ALT  56<H>  /  AlkPhos  173<H>  05-04      Creatinine, Serum: 0.48 mg/dL (05-04-20 @ 09:06)  Creatinine, Serum: 0.72 mg/dL (05-02-20 @ 18:58)  Creatinine, Serum: 0.74 mg/dL (05-02-20 @ 11:30)  Creatinine, Serum: 0.89 mg/dL (05-01-20 @ 05:20)  Creatinine, Serum: 0.68 mg/dL (04-30-20 @ 05:27)    Procalcitonin, Serum: 0.83 ng/mL (05-04-20 @ 09:06)  Procalcitonin, Serum: 7.69 ng/mL (04-30-20 @ 16:25)  Procalcitonin, Serum: 0.17 ng/mL (04-29-20 @ 07:05)  Procalcitonin, Serum: 0.18 ng/mL (04-27-20 @ 10:34)    D-Dimer Assay, Quantitative: 792 ng/mL DDU (05-02-20 @ 11:30)  D-Dimer Assay, Quantitative: 276 ng/mL DDU (04-29-20 @ 07:05)  D-Dimer Assay, Quantitative: 355 ng/mL DDU (04-27-20 @ 10:34)    Ferritin, Serum: 2415 ng/mL (05-04-20 @ 09:06)  Ferritin, Serum: 2480 ng/mL (05-01-20 @ 05:20)  Ferritin, Serum: 1207 ng/mL (04-29-20 @ 07:05)  Ferritin, Serum: 610 ng/mL (04-27-20 @ 10:34)    C-Reactive Protein, Serum: 6.38 mg/dL (05-04-20 @ 09:06)  C-Reactive Protein, Serum: 35.78 mg/dL (05-01-20 @ 05:20)  C-Reactive Protein, Serum: 9.44 mg/dL (04-29-20 @ 07:05)  C-Reactive Protein, Serum: 4.81 mg/dL (04-27-20 @ 10:34)    WBC Count: 10.45 K/uL (05-04-20 @ 09:06)  WBC Count: 10.33 K/uL (05-02-20 @ 14:17)  WBC Count: 12.94 K/uL (05-01-20 @ 05:20)  WBC Count: 13.75 K/uL (04-30-20 @ 05:27)      COVID-19 PCR: Detected (04-27-20 @ 10:01)    Lactate Dehydrogenase, Serum: 611 U/L (05-04-20 @ 09:06)  Lactate Dehydrogenase, Serum: 547 U/L (05-02-20 @ 11:30)  Lactate Dehydrogenase, Serum: 558 U/L (05-01-20 @ 05:20)    Alkaline Phosphatase, Serum: 173 U/L (05-04-20 @ 09:06)  Alkaline Phosphatase, Serum: 143 U/L (05-02-20 @ 11:30)  Alkaline Phosphatase, Serum: 115 U/L (05-01-20 @ 05:20)  Alanine Aminotransferase (ALT/SGPT): 56 U/L (05-04-20 @ 09:06)  Alanine Aminotransferase (ALT/SGPT): 41 U/L (05-02-20 @ 11:30)  Alanine Aminotransferase (ALT/SGPT): 36 U/L (05-01-20 @ 05:20)  Aspartate Aminotransferase (AST/SGOT): 83 U/L (05-04-20 @ 09:06)  Aspartate Aminotransferase (AST/SGOT): 82 U/L (05-02-20 @ 11:30)  Aspartate Aminotransferase (AST/SGOT): 72 U/L (05-01-20 @ 05:20)  Bilirubin Total, Serum: 0.9 mg/dL (05-04-20 @ 09:06)  Bilirubin Total, Serum: 0.8 mg/dL (05-02-20 @ 11:30)  Bilirubin Total, Serum: 0.7 mg/dL (05-01-20 @ 05:20)

## 2020-05-05 LAB
CULTURE RESULTS: NO GROWTH — SIGNIFICANT CHANGE UP
SPECIMEN SOURCE: SIGNIFICANT CHANGE UP

## 2020-05-05 PROCEDURE — 99232 SBSQ HOSP IP/OBS MODERATE 35: CPT

## 2020-05-05 PROCEDURE — 71045 X-RAY EXAM CHEST 1 VIEW: CPT | Mod: 26

## 2020-05-05 PROCEDURE — 99233 SBSQ HOSP IP/OBS HIGH 50: CPT

## 2020-05-05 RX ORDER — FENTANYL CITRATE 50 UG/ML
1 INJECTION INTRAVENOUS
Refills: 0 | Status: DISCONTINUED | OUTPATIENT
Start: 2020-05-05 | End: 2020-05-11

## 2020-05-05 RX ORDER — ACETAMINOPHEN 500 MG
1000 TABLET ORAL ONCE
Refills: 0 | Status: COMPLETED | OUTPATIENT
Start: 2020-05-05 | End: 2020-05-05

## 2020-05-05 RX ORDER — MORPHINE SULFATE 50 MG/1
2 CAPSULE, EXTENDED RELEASE ORAL EVERY 4 HOURS
Refills: 0 | Status: DISCONTINUED | OUTPATIENT
Start: 2020-05-05 | End: 2020-05-11

## 2020-05-05 RX ADMIN — MEMANTINE HYDROCHLORIDE 5 MILLIGRAM(S): 10 TABLET ORAL at 09:07

## 2020-05-05 RX ADMIN — Medication 400 MILLIGRAM(S): at 16:52

## 2020-05-05 RX ADMIN — FENTANYL CITRATE 1 PATCH: 50 INJECTION INTRAVENOUS at 18:46

## 2020-05-05 RX ADMIN — ENOXAPARIN SODIUM 40 MILLIGRAM(S): 100 INJECTION SUBCUTANEOUS at 16:53

## 2020-05-05 RX ADMIN — ENOXAPARIN SODIUM 40 MILLIGRAM(S): 100 INJECTION SUBCUTANEOUS at 04:31

## 2020-05-05 RX ADMIN — Medication 5 MILLIGRAM(S): at 04:31

## 2020-05-05 RX ADMIN — AZITHROMYCIN 250 MILLIGRAM(S): 500 TABLET, FILM COATED ORAL at 15:39

## 2020-05-05 RX ADMIN — CEFTRIAXONE 100 MILLIGRAM(S): 500 INJECTION, POWDER, FOR SOLUTION INTRAMUSCULAR; INTRAVENOUS at 13:00

## 2020-05-05 RX ADMIN — Medication 5 MILLIGRAM(S): at 17:02

## 2020-05-05 RX ADMIN — FENTANYL CITRATE 1 PATCH: 50 INJECTION INTRAVENOUS at 20:32

## 2020-05-05 NOTE — PROGRESS NOTE ADULT - ASSESSMENT
This  80yF PMH of CVA, dementia, HLD, HTN presented to the ED for evaluation of weakness, lethargy and decreased PO intake. Unable to obtain history from patient as is poor historian, currently minimally responsive due to lethargy.  Baseline AAOx1 (self).  Per care manager at [facility] for past 24 hrs they noted pt has been increasing lethargic and needed extra assistance with ADL's. Normally requires 1 person to assist but currently required 3 person assist. Normally has good appetite, now not eating or drinking, requiring 1:1 assistance with feeds. Pt also normally verbal but has had AMS and minimally responsive for past 1-2 days. Pt with positive exposure to mulitple roomates with COVID-19. Pt at bedside states she feels weak and has mild SOB. (27 Apr 2020 13:26)    patient tested positive for COVID-19 PCR: Detected (27 Apr 2020 10:01)  She was maintained on nasal cannula since admission.  patient cannot offer any information.   X ray of the chest showed left side infiltrate.     Impression:  COVID-19 Pneumonia  acute hypoxic respiratory failure  cough  fevers  shortness of breath    Plan:  - Continue supportive care measures  - continue Oxygenation    INFLAMM markers coming down    patient noted with elevated procalcitonin and WBC    continue AZITHRO and CTX to treat possible secondary bacterial PNA     - trend CBC with diff, CMP with LFT's  - trend Inflammatory markers (ESR, CRP, Ferritin, LDH,  procalcitonin)  q48h.  D dimer, lactate, troponin, CK, PT/PTT x 1      Palliative care team following.   Consider goals of care      Please call back for any new questions.

## 2020-05-05 NOTE — PROGRESS NOTE ADULT - ASSESSMENT
80yF PMH of CVA, dementia, HLD, HTN presented to the ED for evaluation of weakness, lethargy and decreased PO intake. Admitted for acute metabolic encephalopathy 2/2 COVID.     #Acute Met Encephalopathy 2/2 viral infection most likely 2/2 covid.  w/u for other etiologies negative to date.  Mental status slightly improved today, though remains lethargic  - History of CVA and dementia, Baseline A/O x1 (self)  - Per facility and , AMS and worsening functional status from baseline. (decreased PO intake, unable to ambulate, less verbal/responsive)  - CT head noted with chronic changes / no acute changes.  - Appreciate speech/swallow eval  - Appreciate palliative care input  - On empiric course of AZITHRO and CTX to treat possible secondary bacterial PNA.  WBC's, inflammatory markers, procalcitonin all improving.    #hypoxia 2/2 COVID - currently requiring Venturi mask.  - Supplemental O2 as needed to maintain O2 sat>90%.  Will attempt to wean as tolerated.  - Continue to monitor inflammatory markers   - Tylenol, tessalon pearls, robitussin prn  - Prone positioning    #History of Dementia  - Namenda   - home med of [place] non formulary, family to provide    #Hypertension  - c/w hydralazine     Hyponatremia:  resolved  - Continue to monitor    Hypokalemia:  - Replete K+.  - Continue to monitor.    # DVT Prophylactic   - Lovenox SC     DNR/DNI

## 2020-05-05 NOTE — PROGRESS NOTE ADULT - SUBJECTIVE AND OBJECTIVE BOX
INTERVAL HPI/OVERNIGHT EVENTS: 79 yo Female Patient PMH HTN, CVA Dementia, transferred from SNF non responsive Hypoxic + COVID PNA Small L Infiltrate  F/U Note:  CXR: New  B/L Perihilar and peripheral airspace consolidation  Patient here 9 days is more alert today, than I have ever seen her.  Anxiety level building as per RN not resting well  She was thirsty but too weak to pull on the straw-not eating  Admits to having back pain "Back is killing me" first time she self reported pain.  She cannot tolerate Prone positioning, Lying on Left side most of time.  Venturi Mask at 50% and O2 NC at 6L.  O2 SAT 95% today- not coughing  She is afebrile    80y old  Female who presents with a chief complaint of AMS/lethargy (05 May 2020 13:35)    PAST MEDICAL & SURGICAL HISTORY:  CVA (cerebral vascular accident)  Dementia  Hypertension  S/P colonoscopy with polypectomy  S/P inguinal hernia repair: Bilateral  age 17    Present Symptoms:     Dyspnea:2 -3   Nausea/Vomiting:  No  Anxiety:  Yes   Depression:  No  Fatigue: Yes   Loss of appetite: Yes     Pain: Back pain-could not be more specific "SEVERE"            Character-            Duration-            Effect-            Factors-            Frequency-            Location-            Severity-    Review of Systems: Reviewed                      All others negative    MEDICATIONS  (STANDING):  acetaminophen  IVPB .. 1000 milliGRAM(s) IV Intermittent once  ascorbic acid 500 milliGRAM(s) Oral daily  aspirin enteric coated 81 milliGRAM(s) Oral daily  atorvastatin 10 milliGRAM(s) Oral at bedtime  azithromycin  IVPB      azithromycin  IVPB 250 milliGRAM(s) IV Intermittent every 24 hours  dextrose 5%. 1000 milliLiter(s) (100 mL/Hr) IV Continuous <Continuous>  enoxaparin Injectable 40 milliGRAM(s) SubCutaneous every 12 hours  fentaNYL   Patch  12 MICROgram(s)/Hr 1 Patch Transdermal every 72 hours  hydrALAZINE Injectable 5 milliGRAM(s) IV Push every 8 hours  memantine 10 milliGRAM(s) Oral <User Schedule>  memantine 5 milliGRAM(s) Oral <User Schedule>  multivitamin 1 Tablet(s) Oral daily    MEDICATIONS  (PRN):  acetaminophen  Suppository .. 650 milliGRAM(s) Rectal every 6 hours PRN Temp greater or equal to 38C (100.4F), Mild Pain (1 - 3)  ALBUTerol    90 MICROgram(s) HFA Inhaler 2 Puff(s) Inhalation every 4 hours PRN Shortness of Breath and/or Wheezing  guaifenesin/dextromethorphan  Syrup 10 milliLiter(s) Oral every 4 hours PRN Cough  haloperidol    Injectable 1 milliGRAM(s) IntraMuscular every 12 hours PRN For Sundowning or Agitation  LORazepam   Injectable 0.5 milliGRAM(s) IV Push every 6 hours PRN anxiety restlessness, noncompliance  morphine  - Injectable 2 milliGRAM(s) IV Push every 4 hours PRN dyspnea/pain      PHYSICAL EXAM: Due to the nature of this patient's COVID-19 isolation status, no bedside physical exam was done to limit spread of infection. Examination highlights were provided by bedside nurse and primary team. Objective data were reviewed in detail.    LABS:                        13.3   10.45 )-----------( 256      ( 04 May 2020 09:06 )             40.8     05    137  |  102  |  13.0  ----------------------------<  137<H>  3.1<L>   |  16.0<L>  |  0.48<L>    Ca    8.6      04 May 2020 09:06    TPro  6.7  /  Alb  2.6<L>  /  TBili  0.9  /  DBili  x   /  AST  83<H>  /  ALT  56<H>  /  AlkPhos  173<H>  05      Urinalysis Basic - ( 04 May 2020 15:20 )    Color: Yellow / Appearance: Clear / S.005 / pH: x  Gluc: x / Ketone: Negative  / Bili: Negative / Urobili: Negative mg/dL   Blood: x / Protein: 30 mg/dL / Nitrite: Negative   Leuk Esterase: Small / RBC: 3-5 /HPF / WBC 3-5   Sq Epi: x / Non Sq Epi: Occasional / Bacteria: Negative      I&O's Summary    04 May 2020 07:01  -  05 May 2020 07:00  --------------------------------------------------------  IN: 1260 mL / OUT: 500 mL / NET: 760 mL    05 May 2020 07:01  -  05 May 2020 15:27  --------------------------------------------------------  IN: 50 mL / OUT: 0 mL / NET: 50 mL    RADIOLOGY & ADDITIONAL STUDIES:    < from: Xray Chest 1 View- PORTABLE-Urgent (20 @ 09:50) >  INDINGS:   The lungs  show perihilar and peripheral airspace consolidations.    The heart and mediastinum are within normal limits.    Visualized osseous structures are intact.        IMPRESSION:   New bilateral perihilar and peripheral airspace consolidations..    ADVANCE DIRECTIVES:   DNR YES   Completed on:                     MOLST  YES    Completed on:  Living Will   NO   Completed on:

## 2020-05-05 NOTE — PROGRESS NOTE ADULT - SUBJECTIVE AND OBJECTIVE BOX
INTERVAL HPI/OVERNIGHT EVENTS:   Pt seen and evaluated at bedside. Somewhat more alert today - pt spoke a full sentence - "My name is Divya".  Satting 91-95% on Venturi mask.  Afebrile.         MEDICATIONS  (STANDING):  ascorbic acid 500 milliGRAM(s) Oral daily  aspirin enteric coated 81 milliGRAM(s) Oral daily  atorvastatin 10 milliGRAM(s) Oral at bedtime  azithromycin  IVPB      azithromycin  IVPB 250 milliGRAM(s) IV Intermittent every 24 hours  dextrose 5%. 1000 milliLiter(s) (100 mL/Hr) IV Continuous <Continuous>  enoxaparin Injectable 40 milliGRAM(s) SubCutaneous every 12 hours  hydrALAZINE Injectable 5 milliGRAM(s) IV Push every 8 hours  memantine 10 milliGRAM(s) Oral <User Schedule>  memantine 5 milliGRAM(s) Oral <User Schedule>  multivitamin 1 Tablet(s) Oral daily    MEDICATIONS  (PRN):  acetaminophen  Suppository .. 650 milliGRAM(s) Rectal every 6 hours PRN Temp greater or equal to 38C (100.4F), Mild Pain (1 - 3)  ALBUTerol    90 MICROgram(s) HFA Inhaler 2 Puff(s) Inhalation every 4 hours PRN Shortness of Breath and/or Wheezing  guaifenesin/dextromethorphan  Syrup 10 milliLiter(s) Oral every 4 hours PRN Cough  haloperidol    Injectable 1 milliGRAM(s) IntraMuscular every 12 hours PRN For Sundowning or Agitation      Allergies    No Known Allergies      Vital Signs Last 24 Hrs  T(C): 36.6 (05 May 2020 08:10), Max: 36.6 (05 May 2020 08:10)  T(F): 97.9 (05 May 2020 08:10), Max: 97.9 (05 May 2020 08:10)  HR: 101 (05 May 2020 08:10) (85 - 101)  BP: 103/63 (05 May 2020 08:10) (99/56 - 151/86)  BP(mean): --  RR: 18 (05 May 2020 08:10) (18 - 20)  SpO2: 97% (05 May 2020 13:19) (91% - 97%)          LABS:                        13.3   10.45 )-----------( 256      ( 04 May 2020 09:06 )             40.8     05-04    137  |  102  |  13.0  ----------------------------<  137<H>  3.1<L>   |  16.0<L>  |  0.48<L>    Ca    8.6      04 May 2020 09:06    TPro  6.7  /  Alb  2.6<L>  /  TBili  0.9  /  DBili  x   /  AST  83<H>  /  ALT  56<H>  /  AlkPhos  173<H>        Urinalysis Basic - ( 04 May 2020 15:20 )    Color: Yellow / Appearance: Clear / S.005 / pH: x  Gluc: x / Ketone: Negative  / Bili: Negative / Urobili: Negative mg/dL   Blood: x / Protein: 30 mg/dL / Nitrite: Negative   Leuk Esterase: Small / RBC: 3-5 /HPF / WBC 3-5   Sq Epi: x / Non Sq Epi: Occasional / Bacteria: Negative          RADIOLOGY & ADDITIONAL TESTS:

## 2020-05-05 NOTE — PROGRESS NOTE ADULT - ASSESSMENT
81 yo Frail Elderly Female PMH Dementia- Transferred from SNF with + COVID 19 PNA    Acute Hypoxic Respiratory Failure  Afebrile  Patient has been slowly deteriorating CO2 yesterday at lowest 16  Depending on higher concentration oxygen- Venturi Mask at 50% + Nasal O2 6L  Inflammatory Markers have been decreasing C-React 6.38, Ferritin 2415 Pro Calcitonin 0.83  PTT > 64.5  Liver Enzymes climbing see results above and boldened    Secondary PNA- Persistent Dyspnea  New B/L Perihilar and peripheral airspace consolidation  She has been completing IV Abx today  Hypothermia last night needed bear-hugger  Weaker-No longer eating or drinking-too weak to sip a straw  DDimer> to 792  Dyspnea at rest- Desaturates immediately when taking off mask  Morphine 2mg IV Q4 prn for hypoxia    Anxiety Multifactorial  Related to Dementia  Related to DYspnea and dependence on O2 mask  Unable to get comfortable move on her own or tolerate proning  Ativan 0.5mg IV Q6 prn    GOC:   Will call Maurisio her  and update him regarding clinical status  More alert, in some ways improving- Hypoxia worsening

## 2020-05-05 NOTE — PROGRESS NOTE ADULT - SUBJECTIVE AND OBJECTIVE BOX
[facility]  INFECTIOUS DISEASES AND INTERNAL MEDICINE at [place]  =======================================================  Ag Cobos MD  Diplomates American Board of Internal Medicine and Infectious Diseases  Telephone [phone]  Fax            [phone]  =======================================================    [id number]  FLOIRDALMA GILES   follow up:  COVID-19 pneumonia    more awake  still minimally communicative.     =======================================================    REVIEW OF SYSTEMS:  mental status limits exam    =======================================================  Allergies  No Known Allergies    ======================================================  Physical Exam:  ============  (see vitals section below)    General:  No acute distress.  Eye: Pupils are equal, round and reactive to light, Extraocular movements are intact, Normal conjunctiva.  HENT: Normocephalic, Oral mucosa is moist, No pharyngeal erythema, No sinus tenderness.  NASAL cannula in place.   Neck: Supple, No lymphadenopathy.  Respiratory: Lungs  with SCATTERED RHONCHI  Cardiovascular: Normal rate, Regular rhythm,   Gastrointestinal: Soft, Non-tender, Non-distended, Normal bowel sounds.  Genitourinary: No costovertebral angle tenderness.  Lymphatics: No lymphadenopathy neck,   Musculoskeletal: Normal range of motion, Normal strength.  Integumentary: No rash.  Neurologic:  awake, minimally verbal      =======================================================  Vitals:  ============  T(F): 97.9 (05 May 2020 08:10), Max: 97.9 (05 May 2020 08:10)  HR: 101 (05 May 2020 08:10)  BP: 103/63 (05 May 2020 08:10)  RR: 18 (05 May 2020 08:10)  SpO2: 95% (05 May 2020 08:10) (84% - 95%)  temp max in last 48H T(F): , Max: 98.5 (05-04-20 @ 05:41)    =======================================================  Current Antibiotics:  azithromycin  IVPB      azithromycin  IVPB 250 milliGRAM(s) IV Intermittent every 24 hours  cefTRIAXone   IVPB 1000 milliGRAM(s) IV Intermittent every 24 hours    Other medications:  ascorbic acid 500 milliGRAM(s) Oral daily  aspirin enteric coated 81 milliGRAM(s) Oral daily  atorvastatin 10 milliGRAM(s) Oral at bedtime  dextrose 5%. 1000 milliLiter(s) IV Continuous <Continuous>  enoxaparin Injectable 40 milliGRAM(s) SubCutaneous every 12 hours  hydrALAZINE Injectable 5 milliGRAM(s) IV Push every 8 hours  memantine 10 milliGRAM(s) Oral <User Schedule>  memantine 5 milliGRAM(s) Oral <User Schedule>  multivitamin 1 Tablet(s) Oral daily      =======================================================  Labs:                        13.3   10.45 )-----------( 256      ( 04 May 2020 09:06 )             40.8      05-04    137  |  102  |  13.0  ----------------------------<  137<H>  3.1<L>   |  16.0<L>  |  0.48<L>    Ca    8.6      04 May 2020 09:06    TPro  6.7  /  Alb  2.6<L>  /  TBili  0.9  /  DBili  x   /  AST  83<H>  /  ALT  56<H>  /  AlkPhos  173<H>  05-04      Creatinine, Serum: 0.48 mg/dL (05-04-20 @ 09:06)  Creatinine, Serum: 0.72 mg/dL (05-02-20 @ 18:58)  Creatinine, Serum: 0.74 mg/dL (05-02-20 @ 11:30)  Creatinine, Serum: 0.89 mg/dL (05-01-20 @ 05:20)    Procalcitonin, Serum: 0.83 ng/mL (05-04-20 @ 09:06)  Procalcitonin, Serum: 7.69 ng/mL (04-30-20 @ 16:25)  Procalcitonin, Serum: 0.17 ng/mL (04-29-20 @ 07:05)  Procalcitonin, Serum: 0.18 ng/mL (04-27-20 @ 10:34)    D-Dimer Assay, Quantitative: 792 ng/mL DDU (05-02-20 @ 11:30)  D-Dimer Assay, Quantitative: 276 ng/mL DDU (04-29-20 @ 07:05)  D-Dimer Assay, Quantitative: 355 ng/mL DDU (04-27-20 @ 10:34)    Ferritin, Serum: 2415 ng/mL (05-04-20 @ 09:06)  Ferritin, Serum: 2480 ng/mL (05-01-20 @ 05:20)  Ferritin, Serum: 1207 ng/mL (04-29-20 @ 07:05)  Ferritin, Serum: 610 ng/mL (04-27-20 @ 10:34)    C-Reactive Protein, Serum: 6.38 mg/dL (05-04-20 @ 09:06)  C-Reactive Protein, Serum: 35.78 mg/dL (05-01-20 @ 05:20)  C-Reactive Protein, Serum: 9.44 mg/dL (04-29-20 @ 07:05)  C-Reactive Protein, Serum: 4.81 mg/dL (04-27-20 @ 10:34)    WBC Count: 10.45 K/uL (05-04-20 @ 09:06)  WBC Count: 10.33 K/uL (05-02-20 @ 14:17)  WBC Count: 12.94 K/uL (05-01-20 @ 05:20)      COVID-19 PCR: Detected (04-27-20 @ 10:01)    Lactate Dehydrogenase, Serum: 611 U/L (05-04-20 @ 09:06)  Lactate Dehydrogenase, Serum: 547 U/L (05-02-20 @ 11:30)  Lactate Dehydrogenase, Serum: 558 U/L (05-01-20 @ 05:20)    Alkaline Phosphatase, Serum: 173 U/L (05-04-20 @ 09:06)  Alkaline Phosphatase, Serum: 143 U/L (05-02-20 @ 11:30)  Alanine Aminotransferase (ALT/SGPT): 56 U/L (05-04-20 @ 09:06)  Alanine Aminotransferase (ALT/SGPT): 41 U/L (05-02-20 @ 11:30)  Aspartate Aminotransferase (AST/SGOT): 83 U/L (05-04-20 @ 09:06)  Aspartate Aminotransferase (AST/SGOT): 82 U/L (05-02-20 @ 11:30)  Bilirubin Total, Serum: 0.9 mg/dL (05-04-20 @ 09:06)  Bilirubin Total, Serum: 0.8 mg/dL (05-02-20 @ 11:30)

## 2020-05-06 LAB
ALBUMIN SERPL ELPH-MCNC: 2.8 G/DL — LOW (ref 3.3–5.2)
ALP SERPL-CCNC: 269 U/L — HIGH (ref 40–120)
ALT FLD-CCNC: 74 U/L — HIGH
ANION GAP SERPL CALC-SCNC: 19 MMOL/L — HIGH (ref 5–17)
ANISOCYTOSIS BLD QL: SLIGHT — SIGNIFICANT CHANGE UP
AST SERPL-CCNC: 90 U/L — HIGH
BASOPHILS # BLD AUTO: 0 K/UL — SIGNIFICANT CHANGE UP (ref 0–0.2)
BASOPHILS NFR BLD AUTO: 0 % — SIGNIFICANT CHANGE UP (ref 0–2)
BILIRUB SERPL-MCNC: 1.2 MG/DL — SIGNIFICANT CHANGE UP (ref 0.4–2)
BUN SERPL-MCNC: 15 MG/DL — SIGNIFICANT CHANGE UP (ref 8–20)
CALCIUM SERPL-MCNC: 9.4 MG/DL — SIGNIFICANT CHANGE UP (ref 8.6–10.2)
CHLORIDE SERPL-SCNC: 107 MMOL/L — SIGNIFICANT CHANGE UP (ref 98–107)
CO2 SERPL-SCNC: 17 MMOL/L — LOW (ref 22–29)
CREAT SERPL-MCNC: 0.67 MG/DL — SIGNIFICANT CHANGE UP (ref 0.5–1.3)
DACRYOCYTES BLD QL SMEAR: SLIGHT — SIGNIFICANT CHANGE UP
EOSINOPHIL # BLD AUTO: 0 K/UL — SIGNIFICANT CHANGE UP (ref 0–0.5)
EOSINOPHIL NFR BLD AUTO: 0 % — SIGNIFICANT CHANGE UP (ref 0–6)
GIANT PLATELETS BLD QL SMEAR: PRESENT — SIGNIFICANT CHANGE UP
GLUCOSE BLDC GLUCOMTR-MCNC: 136 MG/DL — HIGH (ref 70–99)
GLUCOSE SERPL-MCNC: 114 MG/DL — HIGH (ref 70–99)
HCT VFR BLD CALC: 44.8 % — SIGNIFICANT CHANGE UP (ref 34.5–45)
HGB BLD-MCNC: 14.4 G/DL — SIGNIFICANT CHANGE UP (ref 11.5–15.5)
LYMPHOCYTES # BLD AUTO: 1.46 K/UL — SIGNIFICANT CHANGE UP (ref 1–3.3)
LYMPHOCYTES # BLD AUTO: 12.3 % — LOW (ref 13–44)
MACROCYTES BLD QL: SLIGHT — SIGNIFICANT CHANGE UP
MANUAL SMEAR VERIFICATION: SIGNIFICANT CHANGE UP
MCHC RBC-ENTMCNC: 30.3 PG — SIGNIFICANT CHANGE UP (ref 27–34)
MCHC RBC-ENTMCNC: 32.1 GM/DL — SIGNIFICANT CHANGE UP (ref 32–36)
MCV RBC AUTO: 94.1 FL — SIGNIFICANT CHANGE UP (ref 80–100)
METAMYELOCYTES # FLD: 0.9 % — HIGH (ref 0–0)
MICROCYTES BLD QL: SLIGHT — SIGNIFICANT CHANGE UP
MONOCYTES # BLD AUTO: 0.41 K/UL — SIGNIFICANT CHANGE UP (ref 0–0.9)
MONOCYTES NFR BLD AUTO: 3.5 % — SIGNIFICANT CHANGE UP (ref 2–14)
NEUTROPHILS # BLD AUTO: 9.87 K/UL — HIGH (ref 1.8–7.4)
NEUTROPHILS NFR BLD AUTO: 82.4 % — HIGH (ref 43–77)
NEUTS BAND # BLD: 0.9 % — SIGNIFICANT CHANGE UP (ref 0–8)
NRBC # BLD: 1 /100 — HIGH (ref 0–0)
OVALOCYTES BLD QL SMEAR: SLIGHT — SIGNIFICANT CHANGE UP
PLAT MORPH BLD: NORMAL — SIGNIFICANT CHANGE UP
PLATELET # BLD AUTO: 264 K/UL — SIGNIFICANT CHANGE UP (ref 150–400)
POLYCHROMASIA BLD QL SMEAR: SLIGHT — SIGNIFICANT CHANGE UP
POTASSIUM SERPL-MCNC: 3.5 MMOL/L — SIGNIFICANT CHANGE UP (ref 3.5–5.3)
POTASSIUM SERPL-SCNC: 3.5 MMOL/L — SIGNIFICANT CHANGE UP (ref 3.5–5.3)
PROT SERPL-MCNC: 7.3 G/DL — SIGNIFICANT CHANGE UP (ref 6.6–8.7)
RBC # BLD: 4.76 M/UL — SIGNIFICANT CHANGE UP (ref 3.8–5.2)
RBC # FLD: 13.1 % — SIGNIFICANT CHANGE UP (ref 10.3–14.5)
RBC BLD AUTO: ABNORMAL
SODIUM SERPL-SCNC: 143 MMOL/L — SIGNIFICANT CHANGE UP (ref 135–145)
WBC # BLD: 11.85 K/UL — HIGH (ref 3.8–10.5)
WBC # FLD AUTO: 11.85 K/UL — HIGH (ref 3.8–10.5)

## 2020-05-06 PROCEDURE — 99233 SBSQ HOSP IP/OBS HIGH 50: CPT

## 2020-05-06 RX ADMIN — FENTANYL CITRATE 1 PATCH: 50 INJECTION INTRAVENOUS at 07:40

## 2020-05-06 RX ADMIN — SODIUM CHLORIDE 50 MILLILITER(S): 9 INJECTION, SOLUTION INTRAVENOUS at 23:09

## 2020-05-06 RX ADMIN — Medication 5 MILLIGRAM(S): at 15:43

## 2020-05-06 RX ADMIN — ENOXAPARIN SODIUM 40 MILLIGRAM(S): 100 INJECTION SUBCUTANEOUS at 16:06

## 2020-05-06 RX ADMIN — ENOXAPARIN SODIUM 40 MILLIGRAM(S): 100 INJECTION SUBCUTANEOUS at 04:28

## 2020-05-06 RX ADMIN — FENTANYL CITRATE 1 PATCH: 50 INJECTION INTRAVENOUS at 19:48

## 2020-05-06 RX ADMIN — Medication 5 MILLIGRAM(S): at 21:03

## 2020-05-06 NOTE — PROGRESS NOTE ADULT - SUBJECTIVE AND OBJECTIVE BOX
INTERVAL HPI/OVERNIGHT EVENTS:  Pt seen and evaluated at bedside. Mental status stable mostly unchanged - pt remains lethargic but provides one-word answers to questions.  Satting % on Venturi mask.  Afebrile.     MEDICATIONS  (STANDING):  ascorbic acid 500 milliGRAM(s) Oral daily  aspirin enteric coated 81 milliGRAM(s) Oral daily  atorvastatin 10 milliGRAM(s) Oral at bedtime  dextrose 5%. 1000 milliLiter(s) (100 mL/Hr) IV Continuous <Continuous>  enoxaparin Injectable 40 milliGRAM(s) SubCutaneous every 12 hours  fentaNYL   Patch  12 MICROgram(s)/Hr 1 Patch Transdermal every 72 hours  hydrALAZINE Injectable 5 milliGRAM(s) IV Push every 8 hours  memantine 10 milliGRAM(s) Oral <User Schedule>  memantine 5 milliGRAM(s) Oral <User Schedule>  multivitamin 1 Tablet(s) Oral daily    MEDICATIONS  (PRN):  acetaminophen  Suppository .. 650 milliGRAM(s) Rectal every 6 hours PRN Temp greater or equal to 38C (100.4F), Mild Pain (1 - 3)  ALBUTerol    90 MICROgram(s) HFA Inhaler 2 Puff(s) Inhalation every 4 hours PRN Shortness of Breath and/or Wheezing  guaifenesin/dextromethorphan  Syrup 10 milliLiter(s) Oral every 4 hours PRN Cough  haloperidol    Injectable 1 milliGRAM(s) IntraMuscular every 12 hours PRN For Sundowning or Agitation  LORazepam   Injectable 0.5 milliGRAM(s) IV Push every 6 hours PRN anxiety restlessness, noncompliance  morphine  - Injectable 2 milliGRAM(s) IV Push every 4 hours PRN dyspnea/pain      Allergies    No Known Allergies        Vital Signs Last 24 Hrs  T(C): 36.7 (06 May 2020 04:22), Max: 36.8 (05 May 2020 16:45)  T(F): 98.1 (06 May 2020 04:22), Max: 98.3 (05 May 2020 16:45)  HR: 106 (06 May 2020 04:22) (105 - 107)  BP: 104/63 (06 May 2020 04:25) (95/58 - 138/69)  BP(mean): 75 (06 May 2020 04:25) (75 - 75)  RR: 20 (06 May 2020 04:25) (17 - 20)  SpO2: 95% (06 May 2020 04:25) (90% - 97%)        LABS:                        14.4   11.85 )-----------( 264      ( 06 May 2020 05:58 )             44.8         143  |  107  |  15.0  ----------------------------<  114<H>  3.5   |  17.0<L>  |  0.67    Ca    9.4      06 May 2020 05:58    TPro  7.3  /  Alb  2.8<L>  /  TBili  1.2  /  DBili  x   /  AST  90<H>  /  ALT  74<H>  /  AlkPhos  269<H>        Urinalysis Basic - ( 04 May 2020 15:20 )    Color: Yellow / Appearance: Clear / S.005 / pH: x  Gluc: x / Ketone: Negative  / Bili: Negative / Urobili: Negative mg/dL   Blood: x / Protein: 30 mg/dL / Nitrite: Negative   Leuk Esterase: Small / RBC: 3-5 /HPF / WBC 3-5   Sq Epi: x / Non Sq Epi: Occasional / Bacteria: Negative    Culture - Urine (20 @ 02:09)    Specimen Source: .Urine Catheterized    Culture Results:   No growth          RADIOLOGY & ADDITIONAL TESTS:

## 2020-05-06 NOTE — PROGRESS NOTE ADULT - ASSESSMENT
80yF PMH of CVA, dementia, HLD, HTN presented to the ED for evaluation of weakness, lethargy and decreased PO intake. Admitted for acute metabolic encephalopathy 2/2 COVID.     #Acute Met Encephalopathy 2/2 viral infection most likely 2/2 covid.  w/u for other etiologies negative to date.  Mental status stable - remains lethargic, though provides one-word answers to quesioning  - History of CVA and dementia, Baseline A/O x1 (self)  - Per facility and , AMS and worsening functional status from baseline. (decreased PO intake, unable to ambulate, less verbal/responsive)  - CT head noted with chronic changes / no acute changes.  - Appreciate speech/swallow eval  - Appreciate palliative care input  - On empiric course of AZITHRO and CTX to treat possible secondary bacterial PNA.      #hypoxia 2/2 COVID - currently requiring Venturi mask.  - Supplemental O2 as needed to maintain O2 sat>90%.  Will attempt to wean as tolerated.  - Continue to monitor inflammatory markers   - Tylenol, tessalon pearls, robitussin prn  - Prone positioning    #History of Dementia  - Namenda   - home med of [place] non formulary, family to provide    #Hypertension  - c/w hydralazine     Hyponatremia:  resolved  - Continue to monitor    Hypokalemia: resolved  - Continue to monitor.    # DVT Prophylactic   - Lovenox SC     DNR/DNI

## 2020-05-07 LAB
ALBUMIN SERPL ELPH-MCNC: 2.5 G/DL — LOW (ref 3.3–5.2)
ALP SERPL-CCNC: 349 U/L — HIGH (ref 40–120)
ALT FLD-CCNC: 64 U/L — HIGH
ANION GAP SERPL CALC-SCNC: 17 MMOL/L — SIGNIFICANT CHANGE UP (ref 5–17)
AST SERPL-CCNC: 63 U/L — HIGH
BASOPHILS # BLD AUTO: 0.04 K/UL — SIGNIFICANT CHANGE UP (ref 0–0.2)
BASOPHILS NFR BLD AUTO: 0.4 % — SIGNIFICANT CHANGE UP (ref 0–2)
BILIRUB SERPL-MCNC: 1 MG/DL — SIGNIFICANT CHANGE UP (ref 0.4–2)
BUN SERPL-MCNC: 20 MG/DL — SIGNIFICANT CHANGE UP (ref 8–20)
CALCIUM SERPL-MCNC: 8.8 MG/DL — SIGNIFICANT CHANGE UP (ref 8.6–10.2)
CHLORIDE SERPL-SCNC: 109 MMOL/L — HIGH (ref 98–107)
CO2 SERPL-SCNC: 17 MMOL/L — LOW (ref 22–29)
CREAT SERPL-MCNC: 0.62 MG/DL — SIGNIFICANT CHANGE UP (ref 0.5–1.3)
D DIMER BLD IA.RAPID-MCNC: 429 NG/ML DDU — HIGH
EOSINOPHIL # BLD AUTO: 0.1 K/UL — SIGNIFICANT CHANGE UP (ref 0–0.5)
EOSINOPHIL NFR BLD AUTO: 0.9 % — SIGNIFICANT CHANGE UP (ref 0–6)
GLUCOSE SERPL-MCNC: 123 MG/DL — HIGH (ref 70–99)
HCT VFR BLD CALC: 41.5 % — SIGNIFICANT CHANGE UP (ref 34.5–45)
HGB BLD-MCNC: 13.4 G/DL — SIGNIFICANT CHANGE UP (ref 11.5–15.5)
IMM GRANULOCYTES NFR BLD AUTO: 4.7 % — HIGH (ref 0–1.5)
LYMPHOCYTES # BLD AUTO: 2.43 K/UL — SIGNIFICANT CHANGE UP (ref 1–3.3)
LYMPHOCYTES # BLD AUTO: 21.8 % — SIGNIFICANT CHANGE UP (ref 13–44)
MCHC RBC-ENTMCNC: 30.5 PG — SIGNIFICANT CHANGE UP (ref 27–34)
MCHC RBC-ENTMCNC: 32.3 GM/DL — SIGNIFICANT CHANGE UP (ref 32–36)
MCV RBC AUTO: 94.3 FL — SIGNIFICANT CHANGE UP (ref 80–100)
MONOCYTES # BLD AUTO: 0.35 K/UL — SIGNIFICANT CHANGE UP (ref 0–0.9)
MONOCYTES NFR BLD AUTO: 3.1 % — SIGNIFICANT CHANGE UP (ref 2–14)
NEUTROPHILS # BLD AUTO: 7.73 K/UL — HIGH (ref 1.8–7.4)
NEUTROPHILS NFR BLD AUTO: 69.1 % — SIGNIFICANT CHANGE UP (ref 43–77)
NRBC # BLD: 2 /100 WBCS — HIGH (ref 0–0)
PLATELET # BLD AUTO: 292 K/UL — SIGNIFICANT CHANGE UP (ref 150–400)
POTASSIUM SERPL-MCNC: 3.6 MMOL/L — SIGNIFICANT CHANGE UP (ref 3.5–5.3)
POTASSIUM SERPL-SCNC: 3.6 MMOL/L — SIGNIFICANT CHANGE UP (ref 3.5–5.3)
PROT SERPL-MCNC: 6.9 G/DL — SIGNIFICANT CHANGE UP (ref 6.6–8.7)
RBC # BLD: 4.4 M/UL — SIGNIFICANT CHANGE UP (ref 3.8–5.2)
RBC # FLD: 13.2 % — SIGNIFICANT CHANGE UP (ref 10.3–14.5)
SODIUM SERPL-SCNC: 143 MMOL/L — SIGNIFICANT CHANGE UP (ref 135–145)
WBC # BLD: 11.17 K/UL — HIGH (ref 3.8–10.5)
WBC # FLD AUTO: 11.17 K/UL — HIGH (ref 3.8–10.5)

## 2020-05-07 PROCEDURE — 99233 SBSQ HOSP IP/OBS HIGH 50: CPT

## 2020-05-07 PROCEDURE — 99497 ADVNCD CARE PLAN 30 MIN: CPT | Mod: 25

## 2020-05-07 RX ORDER — MORPHINE SULFATE 50 MG/1
2 CAPSULE, EXTENDED RELEASE ORAL EVERY 4 HOURS
Refills: 0 | Status: DISCONTINUED | OUTPATIENT
Start: 2020-05-07 | End: 2020-05-11

## 2020-05-07 RX ORDER — MORPHINE SULFATE 50 MG/1
2 CAPSULE, EXTENDED RELEASE ORAL EVERY 6 HOURS
Refills: 0 | Status: DISCONTINUED | OUTPATIENT
Start: 2020-05-07 | End: 2020-05-11

## 2020-05-07 RX ORDER — ROBINUL 0.2 MG/ML
0.2 INJECTION INTRAMUSCULAR; INTRAVENOUS EVERY 6 HOURS
Refills: 0 | Status: DISCONTINUED | OUTPATIENT
Start: 2020-05-07 | End: 2020-05-11

## 2020-05-07 RX ADMIN — Medication 5 MILLIGRAM(S): at 23:16

## 2020-05-07 RX ADMIN — MORPHINE SULFATE 2 MILLIGRAM(S): 50 CAPSULE, EXTENDED RELEASE ORAL at 04:14

## 2020-05-07 RX ADMIN — Medication 0.5 MILLIGRAM(S): at 23:16

## 2020-05-07 RX ADMIN — MORPHINE SULFATE 2 MILLIGRAM(S): 50 CAPSULE, EXTENDED RELEASE ORAL at 23:17

## 2020-05-07 RX ADMIN — MORPHINE SULFATE 2 MILLIGRAM(S): 50 CAPSULE, EXTENDED RELEASE ORAL at 18:59

## 2020-05-07 RX ADMIN — Medication 5 MILLIGRAM(S): at 12:22

## 2020-05-07 RX ADMIN — MORPHINE SULFATE 2 MILLIGRAM(S): 50 CAPSULE, EXTENDED RELEASE ORAL at 09:24

## 2020-05-07 RX ADMIN — Medication 0.5 MILLIGRAM(S): at 18:59

## 2020-05-07 RX ADMIN — ENOXAPARIN SODIUM 40 MILLIGRAM(S): 100 INJECTION SUBCUTANEOUS at 16:09

## 2020-05-07 RX ADMIN — FENTANYL CITRATE 1 PATCH: 50 INJECTION INTRAVENOUS at 07:23

## 2020-05-07 RX ADMIN — ENOXAPARIN SODIUM 40 MILLIGRAM(S): 100 INJECTION SUBCUTANEOUS at 04:31

## 2020-05-07 RX ADMIN — FENTANYL CITRATE 1 PATCH: 50 INJECTION INTRAVENOUS at 23:14

## 2020-05-07 RX ADMIN — Medication 5 MILLIGRAM(S): at 04:32

## 2020-05-07 NOTE — PROGRESS NOTE ADULT - ASSESSMENT
79 yo F PMH of Dementia- declining functional status-not taking PO meds or food  COVID 19 now with superimposed B/L infiltrates    AMS- Metabolic Encephalopathy  Hypoxia- dependent on 5-6 L O2 NC  CO2 Low< to 17  Liver enzymes elevated  Following labs  PMH Dementia- barely verbal at this time   Difficult to arouse at time of my visit    S/P +COVID 11 days ago  Now with superimposed B/L lung opacities  Most likely aspirating  Very weak, bedbound, only comfortable in one position on L side  Difficult to set her in Proning position  Complication: Superimposed B/L infiltrates  Very poor functional status    Dysphagia  NPO  Puree-   Holding oral meds and food  not interested pocketing food    GOC   Patient has advanced directives since being admitted   does not want her to suffer.  He does wonder if she can survive this Viral infection  I recommend speaking to him again about GOC, making her comfortable, vs continuing to treat her acutely  Reached out to Dr. Thompson to discuss further. 79 yo F PMH of Dementia- declining functional status-not taking PO meds or food  COVID 19 now with superimposed B/L infiltrates    AMS- Metabolic Encephalopathy  Hypoxia- dependent on 5-6 L O2 NC  CO2 Low< to 17  Liver enzymes elevated  Following labs  PMH Dementia- barely verbal at this time   Difficult to arouse at time of my visit    S/P +COVID 11 days ago  Now with superimposed B/L lung opacities  Most likely aspirating  Very weak, bedbound, only comfortable in one position on L side  Difficult to set her in Proning position  Complication: Superimposed B/L infiltrates  Very poor functional status    Dysphagia  NPO  Puree-   Holding oral meds and food  not interested pocketing food    [facility]   Patient has advanced directives since being admitted   does not want her to suffer.  He does wonder if she can survive this Viral infection  I recommend speaking to him again about [facility], making her comfortable, vs continuing to treat her aggresively  Reached out to Dr. Thompson to discuss further.  Addendum- Dr. Thompson did speak to  this afternoon, also recommending we keep her comfortable at this time.  SEE [facility] NOTE    He agreed to make her "Comfort care only"  Requesting private room in hospital so family member can visit while she is still alive

## 2020-05-07 NOTE — PROGRESS NOTE ADULT - NSREFPHYEXINPTDOCREFER_GEN_ALL_CORE
H&P Adult - Hospitalist 4/27/20
Progress Note Adult - Infectious Disease 5/5/2020

## 2020-05-07 NOTE — CHART NOTE - NSCHARTNOTEFT_GEN_A_CORE
Source: Patient [ ]  Family [ ]   other [x ]    Current Diet: Diet, Clear Liquid:   Supplement Feeding Modality:  Oral  Ensure Clear Cans or Servings Per Day:  3       Frequency:  Three Times a day (05-03-20 @ 12:39)    PO intake: Pt continues with poor po intake    Current Weight:   (4/27) 184.9#  -Obtain current wt, continue to monitor. Generalized 1+, face 2+ edema noted.    Pertinent Medications: MEDICATIONS  (STANDING):  ascorbic acid 500 milliGRAM(s) Oral daily  aspirin enteric coated 81 milliGRAM(s) Oral daily  atorvastatin 10 milliGRAM(s) Oral at bedtime  dextrose 5%. 1000 milliLiter(s) (50 mL/Hr) IV Continuous <Continuous>  enoxaparin Injectable 40 milliGRAM(s) SubCutaneous every 12 hours  fentaNYL   Patch  12 MICROgram(s)/Hr 1 Patch Transdermal every 72 hours  hydrALAZINE Injectable 5 milliGRAM(s) IV Push every 8 hours  memantine 10 milliGRAM(s) Oral <User Schedule>  memantine 5 milliGRAM(s) Oral <User Schedule>  multivitamin 1 Tablet(s) Oral daily    MEDICATIONS  (PRN):  acetaminophen  Suppository .. 650 milliGRAM(s) Rectal every 6 hours PRN Temp greater or equal to 38C (100.4F), Mild Pain (1 - 3)  ALBUTerol    90 MICROgram(s) HFA Inhaler 2 Puff(s) Inhalation every 4 hours PRN Shortness of Breath and/or Wheezing  guaifenesin/dextromethorphan  Syrup 10 milliLiter(s) Oral every 4 hours PRN Cough  haloperidol    Injectable 1 milliGRAM(s) IntraMuscular every 12 hours PRN For Sundowning or Agitation  LORazepam   Injectable 0.5 milliGRAM(s) IV Push every 6 hours PRN anxiety restlessness, noncompliance  morphine  - Injectable 2 milliGRAM(s) IV Push every 4 hours PRN dyspnea/pain    Pertinent Labs: CBC Full  -  ( 07 May 2020 08:11 )  WBC Count : 11.17 K/uL  RBC Count : 4.40 M/uL  Hemoglobin : 13.4 g/dL  Hematocrit : 41.5 %  Platelet Count - Automated : 292 K/uL  Mean Cell Volume : 94.3 fl  Mean Cell Hemoglobin : 30.5 pg  Mean Cell Hemoglobin Concentration : 32.3 gm/dL  Auto Neutrophil # : 7.73 K/uL  Auto Lymphocyte # : 2.43 K/uL  Auto Monocyte # : 0.35 K/uL  Auto Eosinophil # : 0.10 K/uL  Auto Basophil # : 0.04 K/uL  Auto Neutrophil % : 69.1 %  Auto Lymphocyte % : 21.8 %  Auto Monocyte % : 3.1 %  Auto Eosinophil % : 0.9 %  Auto Basophil % : 0.4 %  05-07 Na143 mmol/L Glu 123 mg/dL<H> K+ 3.6 mmol/L Cr  0.62 mg/dL BUN 20.0 mg/dL Phos n/a   Alb 2.5 g/dL<L> PAB n/a       Skin: R heel STDI    Nutrition focused physical exam not conducted - found signs of malnutrition [ ]absent [ ]present    Subcutaneous fat loss: [ ] Orbital fat pads region, [ ]Buccal fat region, [ ]Triceps region,  [ ]Ribs region    Muscle wasting: [ ]Temples region, [ ]Clavicle region, [ ]Shoulder region, [ ]Scapula region, [ ]Interosseous region,  [ ]thigh region, [ ]Calf region    Estimated Needs:   [ x] no change since previous assessment  [ ] recalculated:     Current Nutrition Diagnosis: Pt remains at high nutrition risk secondary to increased Nutrient Needs related to increased physiologic demand with healing as evidenced by pt with acute hypoxic respiratory failure due to COVID-19, poor po intake. Pt continues to have suboptimal po intake, consuming 0% of CLD. Nursing documented pt did not feel well enough to eat/refusing po. Last documented BM 5/2. Aware palliative following. RD to remain available.     Pt meets criteria for moderate (acute) malnutrition related to inability to meet sufficient protein-energy in setting of poor po intake 2/2 lethargy and weakness and +COVID19 as evidenced by pt meeting <75% estimated energy needs >7 days and 1+ edema.     Recommendations:   1) Continue Ensure Clear TID  2) RX: MVI and Vitamin C (500mg)  3) Encourage po intake  4) Monitor wts and labs    Monitoring and Evaluation:   [ x] PO intake [ x] Tolerance to diet prescription [X] Weights  [X] Follow up per protocol [X] Labs: Source: Patient [ ]  Family [ ]   other [x ]    Current Diet: Diet, Clear Liquid:   Supplement Feeding Modality:  Oral  Ensure Clear Cans or Servings Per Day:  3       Frequency:  Three Times a day (05-03-20 @ 12:39)    PO intake: Pt continues with poor po intake    Current Weight:   (4/27) 184.9#  -Obtain current wt, continue to monitor. Generalized 1+, face 2+ edema noted.    Pertinent Medications: MEDICATIONS  (STANDING):  ascorbic acid 500 milliGRAM(s) Oral daily  aspirin enteric coated 81 milliGRAM(s) Oral daily  atorvastatin 10 milliGRAM(s) Oral at bedtime  dextrose 5%. 1000 milliLiter(s) (50 mL/Hr) IV Continuous <Continuous>  enoxaparin Injectable 40 milliGRAM(s) SubCutaneous every 12 hours  fentaNYL   Patch  12 MICROgram(s)/Hr 1 Patch Transdermal every 72 hours  hydrALAZINE Injectable 5 milliGRAM(s) IV Push every 8 hours  memantine 10 milliGRAM(s) Oral <User Schedule>  memantine 5 milliGRAM(s) Oral <User Schedule>  multivitamin 1 Tablet(s) Oral daily    MEDICATIONS  (PRN):  acetaminophen  Suppository .. 650 milliGRAM(s) Rectal every 6 hours PRN Temp greater or equal to 38C (100.4F), Mild Pain (1 - 3)  ALBUTerol    90 MICROgram(s) HFA Inhaler 2 Puff(s) Inhalation every 4 hours PRN Shortness of Breath and/or Wheezing  guaifenesin/dextromethorphan  Syrup 10 milliLiter(s) Oral every 4 hours PRN Cough  haloperidol    Injectable 1 milliGRAM(s) IntraMuscular every 12 hours PRN For Sundowning or Agitation  LORazepam   Injectable 0.5 milliGRAM(s) IV Push every 6 hours PRN anxiety restlessness, noncompliance  morphine  - Injectable 2 milliGRAM(s) IV Push every 4 hours PRN dyspnea/pain    Pertinent Labs: CBC Full  -  ( 07 May 2020 08:11 )  WBC Count : 11.17 K/uL  RBC Count : 4.40 M/uL  Hemoglobin : 13.4 g/dL  Hematocrit : 41.5 %  Platelet Count - Automated : 292 K/uL  Mean Cell Volume : 94.3 fl  Mean Cell Hemoglobin : 30.5 pg  Mean Cell Hemoglobin Concentration : 32.3 gm/dL  Auto Neutrophil # : 7.73 K/uL  Auto Lymphocyte # : 2.43 K/uL  Auto Monocyte # : 0.35 K/uL  Auto Eosinophil # : 0.10 K/uL  Auto Basophil # : 0.04 K/uL  Auto Neutrophil % : 69.1 %  Auto Lymphocyte % : 21.8 %  Auto Monocyte % : 3.1 %  Auto Eosinophil % : 0.9 %  Auto Basophil % : 0.4 %  05-07 Na143 mmol/L Glu 123 mg/dL<H> K+ 3.6 mmol/L Cr  0.62 mg/dL BUN 20.0 mg/dL Phos n/a   Alb 2.5 g/dL<L> PAB n/a       Skin: R heel STDI    Nutrition focused physical exam not conducted - found signs of malnutrition [ ]absent [ ]present    Subcutaneous fat loss: [ ] Orbital fat pads region, [ ]Buccal fat region, [ ]Triceps region,  [ ]Ribs region    Muscle wasting: [ ]Temples region, [ ]Clavicle region, [ ]Shoulder region, [ ]Scapula region, [ ]Interosseous region,  [ ]thigh region, [ ]Calf region    Estimated Needs:   [ x] no change since previous assessment  [ ] recalculated:     Current Nutrition Diagnosis: Pt remains at high nutrition risk secondary to increased Nutrient Needs related to increased physiologic demand with healing as evidenced by pt with acute hypoxic respiratory failure due to COVID-19, poor po intake. Pt continues to have suboptimal po intake, consuming 0% of CLD. Nursing documented pt did not feel well enough to eat/refusing po. Last documented BM 5/2. Aware palliative following. RD to remain available.     Pt meets criteria for moderate (acute) malnutrition related to inability to meet sufficient protein-energy in setting of poor po intake 2/2 lethargy and weakness and +COVID19 as evidenced by pt meeting <75% estimated energy needs >7 days and 1+ edema.     Recommendations:   1) Continue Ensure Clear TID  2) Continue MVI and Vitamin C (500mg)  3) Encourage po intake  4) Monitor wts and labs    Monitoring and Evaluation:   [ x] PO intake [ x] Tolerance to diet prescription [X] Weights  [X] Follow up per protocol [X] Labs:

## 2020-05-07 NOTE — PROGRESS NOTE ADULT - ASSESSMENT
80yF PMH of CVA, dementia, HLD, HTN presented to the ED for evaluation of weakness, lethargy and decreased PO intake. Admitted for acute metabolic encephalopathy 2/2 COVID.     #Acute Met Encephalopathy 2/2 viral infection most likely 2/2 covid.  w/u for other etiologies negative to date.  Mental status stable - remains lethargic, though provides one-word answers to questioning.  Refusing PO intake.  - History of CVA and dementia, Baseline A/O x1 (self)  - Per facility and , AMS and worsening functional status from baseline. (decreased PO intake, unable to ambulate, less verbal/responsive)  - CT head noted with chronic changes / no acute changes.  - Appreciate speech/swallow eval  - Appreciate palliative care input  - s/p AZITHRO and CTX to treat possible secondary bacterial PNA.      #hypoxia 2/2 COVID - weaned down to 5L NC.  - Supplemental O2 as needed to maintain O2 sat>90%.  Will attempt to continue weaning as tolerated.  - Continue to monitor inflammatory markers   - Tylenol, tessalon pearls, robitussin prn  - Prone positioning    #History of Dementia  - Namenda   - home med of [place] non formulary, family to provide    #Hypertension  - c/w hydralazine     Hyponatremia:  resolved  - Continue to monitor    Hypokalemia: resolved  - Continue to monitor.    # DVT Prophylactic   - Lovenox SC     DNR/DNI

## 2020-05-07 NOTE — GOALS OF CARE CONVERSATION - ADVANCED CARE PLANNING - STAFF/OTHER
Dr. Thompson spoke to  Maurisio earlier today- Prepared him for poor prognosis and to think about comfort care

## 2020-05-07 NOTE — PROGRESS NOTE ADULT - SUBJECTIVE AND OBJECTIVE BOX
INTERVAL HPI/OVERNIGHT EVENTS:  Pt seen and evaluated at bedside. Mental status stable / mostly unchanged - pt remains lethargic but provides one-word answers to questions responds "alright" upon being asked how she feels.  Satting 92% on 5L NC.  Afebrile.  Refusing PO intake.    MEDICATIONS  (STANDING):  ascorbic acid 500 milliGRAM(s) Oral daily  aspirin enteric coated 81 milliGRAM(s) Oral daily  atorvastatin 10 milliGRAM(s) Oral at bedtime  dextrose 5%. 1000 milliLiter(s) (50 mL/Hr) IV Continuous <Continuous>  enoxaparin Injectable 40 milliGRAM(s) SubCutaneous every 12 hours  fentaNYL   Patch  12 MICROgram(s)/Hr 1 Patch Transdermal every 72 hours  hydrALAZINE Injectable 5 milliGRAM(s) IV Push every 8 hours  memantine 10 milliGRAM(s) Oral <User Schedule>  memantine 5 milliGRAM(s) Oral <User Schedule>  multivitamin 1 Tablet(s) Oral daily    MEDICATIONS  (PRN):  acetaminophen  Suppository .. 650 milliGRAM(s) Rectal every 6 hours PRN Temp greater or equal to 38C (100.4F), Mild Pain (1 - 3)  ALBUTerol    90 MICROgram(s) HFA Inhaler 2 Puff(s) Inhalation every 4 hours PRN Shortness of Breath and/or Wheezing  guaifenesin/dextromethorphan  Syrup 10 milliLiter(s) Oral every 4 hours PRN Cough  haloperidol    Injectable 1 milliGRAM(s) IntraMuscular every 12 hours PRN For Sundowning or Agitation  LORazepam   Injectable 0.5 milliGRAM(s) IV Push every 6 hours PRN anxiety restlessness, noncompliance  morphine  - Injectable 2 milliGRAM(s) IV Push every 4 hours PRN dyspnea/pain      Allergies    No Known Allergies    Vital Signs Last 24 Hrs  T(C): 36.8 (07 May 2020 08:15), Max: 36.9 (06 May 2020 15:30)  T(F): 98.2 (07 May 2020 08:15), Max: 98.5 (06 May 2020 15:30)  HR: 102 (07 May 2020 08:15) (102 - 111)  BP: 106/72 (07 May 2020 12:21) (106/72 - 133/104)  BP(mean): --  RR: 20 (07 May 2020 12:21) (10 - 20)  SpO2: 92% (07 May 2020 12:21) (92% - 95%)        LABS:                        13.4   11.17 )-----------( 292      ( 07 May 2020 08:11 )             41.5     05-07    143  |  109<H>  |  20.0  ----------------------------<  123<H>  3.6   |  17.0<L>  |  0.62    Ca    8.8      07 May 2020 08:11    TPro  6.9  /  Alb  2.5<L>  /  TBili  1.0  /  DBili  x   /  AST  63<H>  /  ALT  64<H>  /  AlkPhos  349<H>  05-07            RADIOLOGY & ADDITIONAL TESTS:

## 2020-05-07 NOTE — PROGRESS NOTE ADULT - SUBJECTIVE AND OBJECTIVE BOX
INTERVAL HPI/OVERNIGHT EVENTS: 81 yo Female Patient was transferred from [facility] Unit SOB, AMS extremely lethargic. Found to be + COVID PNA  This is a F/U Note  Patient seen earlier this afternoon, sleeping difficult to arouse. Face is flushed, very warm- temperature in tent had become very warm. Not taking anything by mouth- holding oral medications  and food in her mouth, not strong enough to sip a straw. Mental status slowly declining. One dose of Morphine given 0900 this morning for Low POX 92% on 5-6 L NC. She appeared to be  more comfortable after receiving medication for her dyspnea. Not interacting with staff-one word answers. Wincing and screaming out when thry try to reposition her or provide  personal hygiene. ROS not possible at this time  80y old  Female who presents with a chief complaint of AMS/lethargy (07 May 2020 13:41)    PAST MEDICAL & SURGICAL HISTORY:  CVA (cerebral vascular accident)  Dementia  Hypertension  S/P colonoscopy with polypectomy  S/P inguinal hernia repair: Bilateral  age 17    Present Symptoms:     Dyspnea:  2- 3 Desaturates when weaning down on Nasal Cannula O2  Nausea/Vomiting: No  Anxiety:  Yes   Depression:  No  Fatigue: Yes   Loss of appetite: Yes ability to safely swallow is in doubt.     Pain: Assume pain is present when she calls out, moaning screaming when physically managed            Character-            Duration-            Effect-            Factors-            Frequency-            Location-            Severity-    Review of Systems: Reviewed                   Unable to obtain due to poor mentation       MEDICATIONS  (STANDING):  ascorbic acid 500 milliGRAM(s) Oral daily  aspirin enteric coated 81 milliGRAM(s) Oral daily  atorvastatin 10 milliGRAM(s) Oral at bedtime  dextrose 5%. 1000 milliLiter(s) (50 mL/Hr) IV Continuous <Continuous>  enoxaparin Injectable 40 milliGRAM(s) SubCutaneous every 12 hours  fentaNYL   Patch  12 MICROgram(s)/Hr 1 Patch Transdermal every 72 hours  hydrALAZINE Injectable 5 milliGRAM(s) IV Push every 8 hours  memantine 10 milliGRAM(s) Oral <User Schedule>  memantine 5 milliGRAM(s) Oral <User Schedule>  multivitamin 1 Tablet(s) Oral daily    MEDICATIONS  (PRN):  acetaminophen  Suppository .. 650 milliGRAM(s) Rectal every 6 hours PRN Temp greater or equal to 38C (100.4F), Mild Pain (1 - 3)  ALBUTerol    90 MICROgram(s) HFA Inhaler 2 Puff(s) Inhalation every 4 hours PRN Shortness of Breath and/or Wheezing  guaifenesin/dextromethorphan  Syrup 10 milliLiter(s) Oral every 4 hours PRN Cough  haloperidol    Injectable 1 milliGRAM(s) IntraMuscular every 12 hours PRN For Sundowning or Agitation  LORazepam   Injectable 0.5 milliGRAM(s) IV Push every 6 hours PRN anxiety restlessness, noncompliance  morphine  - Injectable 2 milliGRAM(s) IV Push every 4 hours PRN dyspnea/pain      PHYSICAL EXAM: Due to the nature of this patient's COVID-19 isolation status, no bedside physical exam was done to limit spread of infection. Examination highlights were provided by bedside nurse and primary team. Objective data were reviewed in detail.    LABS:                        13.4   11.17 )-----------( 292      ( 07 May 2020 08:11 )             41.5     05-07    143  |  109<H>  |  20.0  ----------------------------<  123<H>  3.6   |  17.0<L>  |  0.62    Ca    8.8      07 May 2020 08:11    TPro  6.9  /  Alb  2.5<L>  /  TBili  1.0  /  DBili  x   /  AST  63<H>  /  ALT  64<H>  /  AlkPhos  349<H>  05-07    I&O's Summary    07 May 2020 07:01  -  07 May 2020 14:16  --------------------------------------------------------  IN: 0 mL / OUT: 300 mL / NET: -300 mL    RADIOLOGY & ADDITIONAL STUDIES:  < from: Xray Chest 1 View- PORTABLE-Urgent (05.05.20 @ 09:50) >  FINDINGS:   The lungs  show perihilar and peripheral airspace consolidations.    The heart and mediastinum are within normal limits.    Visualized osseous structures are intact.        IMPRESSION:   New bilateral perihilar and peripheral airspace consolidations..      ADVANCE DIRECTIVES:   DNR YES   Completed on:                     MOLST  YES  Completed on: April 2020  Living Will   NO   Completed on:

## 2020-05-07 NOTE — PROGRESS NOTE ADULT - NSREFPHYEXREFTO_GEN_ALL_CORE
Inpatient Physical Exam
ED Physical Exam
Inpatient Physical Exam

## 2020-05-08 VITALS
HEART RATE: 106 BPM | RESPIRATION RATE: 23 BRPM | SYSTOLIC BLOOD PRESSURE: 117 MMHG | DIASTOLIC BLOOD PRESSURE: 69 MMHG | TEMPERATURE: 98 F

## 2020-05-08 PROCEDURE — 99232 SBSQ HOSP IP/OBS MODERATE 35: CPT | Mod: GC

## 2020-05-08 PROCEDURE — 99233 SBSQ HOSP IP/OBS HIGH 50: CPT

## 2020-05-08 RX ORDER — ALBUTEROL 90 UG/1
2 AEROSOL, METERED ORAL
Qty: 0 | Refills: 0 | DISCHARGE
Start: 2020-05-08

## 2020-05-08 RX ORDER — FENTANYL CITRATE 50 UG/ML
1 INJECTION INTRAVENOUS
Qty: 0 | Refills: 0 | DISCHARGE
Start: 2020-05-08

## 2020-05-08 RX ORDER — AMLODIPINE BESYLATE 2.5 MG/1
1 TABLET ORAL
Qty: 0 | Refills: 0 | DISCHARGE

## 2020-05-08 RX ORDER — MORPHINE SULFATE 50 MG/1
2 CAPSULE, EXTENDED RELEASE ORAL
Qty: 0 | Refills: 0 | DISCHARGE
Start: 2020-05-08

## 2020-05-08 RX ORDER — CHOLECALCIFEROL (VITAMIN D3) 125 MCG
0 CAPSULE ORAL
Qty: 0 | Refills: 0 | DISCHARGE

## 2020-05-08 RX ORDER — RIVASTIGMINE 4.6 MG/24H
1 PATCH, EXTENDED RELEASE TRANSDERMAL
Qty: 0 | Refills: 0 | DISCHARGE

## 2020-05-08 RX ORDER — MEMANTINE HYDROCHLORIDE 10 MG/1
1 TABLET ORAL
Qty: 0 | Refills: 0 | DISCHARGE

## 2020-05-08 RX ORDER — ACETAMINOPHEN 500 MG
1 TABLET ORAL
Qty: 0 | Refills: 0 | DISCHARGE
Start: 2020-05-08

## 2020-05-08 RX ORDER — ATENOLOL 25 MG/1
12.5 TABLET ORAL
Qty: 0 | Refills: 0 | DISCHARGE

## 2020-05-08 RX ORDER — ROBINUL 0.2 MG/ML
0.2 INJECTION INTRAMUSCULAR; INTRAVENOUS
Qty: 0 | Refills: 0 | DISCHARGE
Start: 2020-05-08

## 2020-05-08 RX ADMIN — Medication 5 MILLIGRAM(S): at 05:09

## 2020-05-08 RX ADMIN — FENTANYL CITRATE 1 PATCH: 50 INJECTION INTRAVENOUS at 17:27

## 2020-05-08 RX ADMIN — Medication 0.5 MILLIGRAM(S): at 05:08

## 2020-05-08 RX ADMIN — FENTANYL CITRATE 1 PATCH: 50 INJECTION INTRAVENOUS at 08:00

## 2020-05-08 RX ADMIN — MORPHINE SULFATE 2 MILLIGRAM(S): 50 CAPSULE, EXTENDED RELEASE ORAL at 23:03

## 2020-05-08 RX ADMIN — FENTANYL CITRATE 1 PATCH: 50 INJECTION INTRAVENOUS at 17:28

## 2020-05-08 RX ADMIN — MORPHINE SULFATE 2 MILLIGRAM(S): 50 CAPSULE, EXTENDED RELEASE ORAL at 05:08

## 2020-05-08 RX ADMIN — FENTANYL CITRATE 1 PATCH: 50 INJECTION INTRAVENOUS at 19:58

## 2020-05-08 RX ADMIN — Medication 0.5 MILLIGRAM(S): at 11:13

## 2020-05-08 RX ADMIN — MORPHINE SULFATE 2 MILLIGRAM(S): 50 CAPSULE, EXTENDED RELEASE ORAL at 11:13

## 2020-05-08 RX ADMIN — Medication 0.5 MILLIGRAM(S): at 17:27

## 2020-05-08 RX ADMIN — MORPHINE SULFATE 2 MILLIGRAM(S): 50 CAPSULE, EXTENDED RELEASE ORAL at 17:27

## 2020-05-08 RX ADMIN — Medication 0.5 MILLIGRAM(S): at 23:03

## 2020-05-08 NOTE — GOALS OF CARE CONVERSATION - ADVANCED CARE PLANNING - FUTURE HOSPITALIZATION/TRANSFER
Do not send to hospital unless pain or severe symptoms cannot be otherwise controlled
Do not send to hospital unless pain or severe symptoms cannot be otherwise controlled

## 2020-05-08 NOTE — DISCHARGE NOTE PROVIDER - NSDCMRMEDTOKEN_GEN_ALL_CORE_FT
acetaminophen 650 mg rectal suppository: 1 suppository(ies) rectal every 6 hours, As needed, Temp greater or equal to 38C (100.4F), Mild Pain (1 - 3)  albuterol 90 mcg/inh inhalation aerosol: 2 puff(s) inhaled every 4 hours, As needed, Shortness of Breath and/or Wheezing  fentaNYL 12 mcg/hr transdermal film, extended release: 1 patch transdermal every 72 hours  glycopyrrolate 0.2 mg/mL injectable solution: 0.2 milligram(s) injectable every 6 hours, As Needed  LORazepam: 0.5 milligram(s) intravenous every 4 hours, As Needed  LORazepam: 0.5 milligram(s) intravenous every 6 hours  morphine: 2 milligram(s) intravenous every 4 hours, As Needed  morphine: 2 milligram(s) intravenous every 6 hours

## 2020-05-08 NOTE — GOALS OF CARE CONVERSATION - ADVANCED CARE PLANNING - WHAT MATTERS MOST
That she be safe and omfortable
That she be comfortable and calm
That she be comfortable, no more painful procedures. Treat pain and dyspnea as needed

## 2020-05-08 NOTE — DISCHARGE NOTE PROVIDER - HOSPITAL COURSE
80yF PMH of CVA, dementia, HLD, HTN presented to the ED for evaluation of weakness, lethargy and decreased PO intake. Admitted for acute metabolic encephalopathy 2/2 COVID. Lethargic, minimally responsive. CT head noted with chronic changes / no acute changes. s/p AZITHRO and CTX to treat possible secondary bacterial PNA.  Patient continued to decline, followed by Palliative Care, now on Comfort Care. Family agree to hospice support services. 80yF PMH of CVA, dementia, HLD, HTN presented to the ED for evaluation of weakness, lethargy and decreased PO intake. Admitted for acute metabolic encephalopathy 2/2 COVID. Lethargic, minimally responsive. CT head noted with chronic changes / no acute changes. s/p AZITHRO and CTX to treat possible secondary bacterial PNA.  Patient continued to decline, followed by Palliative Care, family opted for Comfort Care. Patient will be transferred [facility].        Time spent: 36 minutes

## 2020-05-08 NOTE — GOALS OF CARE CONVERSATION - ADVANCED CARE PLANNING - TREATMENT GUIDELINES
IV fluid trial/DNR Order/Antibiotic trial
Do not re-hospitalize/No blood draws/No IV fluids/DNR Order/Comfort measures only/No artificial nutrition/No antibiotics
IV KVO/DNR Order/Comfort measures only/No artificial nutrition/No antibiotics/No blood draws

## 2020-05-08 NOTE — PROGRESS NOTE ADULT - ASSESSMENT
80yF PMH of CVA, dementia, HLD, HTN presented to the ED for evaluation of weakness, lethargy and decreased PO intake. Admitted for acute metabolic encephalopathy 2/2 COVID.     #Acute Met Encephalopathy 2/2 viral infection most likely 2/2 covid.  w/u for other etiologies negative to date. Lethargic, minimally responsive  - History of CVA and dementia, Baseline A/O x1 (self)  - Per facility and , AMS and worsening functional status from baseline. (decreased PO intake, unable to ambulate, less verbal/responsive)  - CT head noted with chronic changes / no acute changes.  - s/p AZITHRO and CTX to treat possible secondary bacterial PNA.    -Palliative  following, now on Comfort Care    #hypoxia 2/2 COVID -  plan as above        DNR/DNI- comfort Care. Hospice eval.

## 2020-05-08 NOTE — DISCHARGE NOTE PROVIDER - NSDCCPCAREPLAN_GEN_ALL_CORE_FT
PRINCIPAL DISCHARGE DIAGNOSIS  Diagnosis: COVID-19  Assessment and Plan of Treatment: +COVID/Metabolic Encephalopathy  DNR/DNI on Comfort Care  Hospice support services      SECONDARY DISCHARGE DIAGNOSES  Diagnosis: Advanced dementia  Assessment and Plan of Treatment: Supportive care

## 2020-05-08 NOTE — PROGRESS NOTE ADULT - SUBJECTIVE AND OBJECTIVE BOX
CC: AMS/lethargy/COVID    INTERVAL HPI/OVERNIGHT EVENTS: Patient seen and examined. On Comfort Care, receiving IV morphine with additional for increase respiratory rate. Minimally responsive to noxious stimuli.     Vital Signs Last 24 Hrs  T(C): 36.9 (08 May 2020 07:45), Max: 36.9 (08 May 2020 07:45)  T(F): 98.5 (08 May 2020 07:45), Max: 98.5 (08 May 2020 07:45)  HR: 106 (08 May 2020 07:45) (91 - 108)  BP: 117/69 (08 May 2020 07:45) (106/72 - 128/82)  BP(mean): --  RR: 23 (08 May 2020 07:45) (20 - 28)  SpO2: 95% (07 May 2020 20:00) (92% - 96%)      PHYSICAL EXAM: Due to the nature of this patient's COVID-19 isolation status, no bedside physical exam was done to limit spread of infection.    I&O's Detail    07 May 2020 07:01  -  08 May 2020 07:00  --------------------------------------------------------  IN:    dextrose 5%.: 500 mL  Total IN: 500 mL    OUT:    Voided: 650 mL  Total OUT: 650 mL    Total NET: -150 mL                                    13.4   11.17 )-----------( 292      ( 07 May 2020 08:11 )             41.5     07 May 2020 08:11    143    |  109    |  20.0   ----------------------------<  123    3.6     |  17.0   |  0.62     Ca    8.8        07 May 2020 08:11    TPro  6.9    /  Alb  2.5    /  TBili  1.0    /  DBili  x      /  AST  63     /  ALT  64     /  AlkPhos  349    07 May 2020 08:11      CAPILLARY BLOOD GLUCOSE        LIVER FUNCTIONS - ( 07 May 2020 08:11 )  Alb: 2.5 g/dL / Pro: 6.9 g/dL / ALK PHOS: 349 U/L / ALT: 64 U/L / AST: 63 U/L / GGT: x               MEDICATIONS  (STANDING):  fentaNYL   Patch  12 MICROgram(s)/Hr 1 Patch Transdermal every 72 hours  hydrALAZINE Injectable 5 milliGRAM(s) IV Push every 8 hours  LORazepam   Injectable 0.5 milliGRAM(s) IV Push every 6 hours  morphine  - Injectable 2 milliGRAM(s) IV Push every 6 hours    MEDICATIONS  (PRN):  acetaminophen  Suppository .. 650 milliGRAM(s) Rectal every 6 hours PRN Temp greater or equal to 38C (100.4F), Mild Pain (1 - 3)  ALBUTerol    90 MICROgram(s) HFA Inhaler 2 Puff(s) Inhalation every 4 hours PRN Shortness of Breath and/or Wheezing  glycopyrrolate Injectable 0.2 milliGRAM(s) IV Push every 6 hours PRN secretion management  LORazepam   Injectable 0.5 milliGRAM(s) IV Push every 6 hours PRN anxiety restlessness, noncompliance  morphine  - Injectable 2 milliGRAM(s) IV Push every 4 hours PRN dyspnea/pain  morphine  - Injectable 2 milliGRAM(s) IV Push every 4 hours PRN persistentdyspnea/pain      RADIOLOGY & ADDITIONAL TESTS: CC: AMS/lethargy/COVID    INTERVAL HPI/OVERNIGHT EVENTS: Patient seen and examined. On Comfort Care, receiving IV morphine with additional for increase respiratory rate. Minimally responsive to noxious stimuli.     Vital Signs  T(C): 36.9 (08 May 2020 07:45), Max: 36.9 (08 May 2020 07:45)  T(F): 98.5 (08 May 2020 07:45), Max: 98.5 (08 May 2020 07:45)  HR: 106 (08 May 2020 07:45) (91 - 108)  BP: 117/69 (08 May 2020 07:45) (106/72 - 128/82)  RR: 23 (08 May 2020 07:45) (20 - 28)  SpO2: 95% (07 May 2020 20:00) (92% - 96%)      PHYSICAL EXAM: Due to the nature of this patient's COVID-19 isolation status, no bedside physical exam was done to limit spread of infection.    I&O's Detail    07 May 2020 07:01  -  08 May 2020 07:00  --------------------------------------------------------  IN:  dextrose 5%.: 500 mL  Total IN: 500 mL    OUT:  Voided: 650 mL  Total OUT: 650 mL    Total NET: -150 mL                        13.4   11.17 )-----------( 292      ( 07 May 2020 08:11 )             41.5     07 May 2020 08:11    143    |  109    |  20.0   ----------------------------<  123    3.6     |  17.0   |  0.62     Ca    8.8        07 May 2020 08:11    TPro  6.9    /  Alb  2.5    /  TBili  1.0    /  DBili  x      /  AST  63     /  ALT  64     /  AlkPhos  349    07 May 2020 08:11    LIVER FUNCTIONS - ( 07 May 2020 08:11 )  Alb: 2.5 g/dL / Pro: 6.9 g/dL / ALK PHOS: 349 U/L / ALT: 64 U/L / AST: 63 U/L / GGT: x           MEDICATIONS  (STANDING):  fentaNYL   Patch  12 MICROgram(s)/Hr 1 Patch Transdermal every 72 hours  hydrALAZINE Injectable 5 milliGRAM(s) IV Push every 8 hours  LORazepam   Injectable 0.5 milliGRAM(s) IV Push every 6 hours  morphine  - Injectable 2 milliGRAM(s) IV Push every 6 hours    MEDICATIONS  (PRN):  acetaminophen  Suppository .. 650 milliGRAM(s) Rectal every 6 hours PRN Temp greater or equal to 38C (100.4F), Mild Pain (1 - 3)  ALBUTerol    90 MICROgram(s) HFA Inhaler 2 Puff(s) Inhalation every 4 hours PRN Shortness of Breath and/or Wheezing  glycopyrrolate Injectable 0.2 milliGRAM(s) IV Push every 6 hours PRN secretion management  LORazepam   Injectable 0.5 milliGRAM(s) IV Push every 6 hours PRN anxiety restlessness, noncompliance  morphine  - Injectable 2 milliGRAM(s) IV Push every 4 hours PRN dyspnea/pain  morphine  - Injectable 2 milliGRAM(s) IV Push every 4 hours PRN persistentdyspnea/pain

## 2020-05-09 PROCEDURE — 99232 SBSQ HOSP IP/OBS MODERATE 35: CPT

## 2020-05-09 RX ADMIN — Medication 0.5 MILLIGRAM(S): at 05:27

## 2020-05-09 RX ADMIN — Medication 0.5 MILLIGRAM(S): at 18:49

## 2020-05-09 RX ADMIN — MORPHINE SULFATE 2 MILLIGRAM(S): 50 CAPSULE, EXTENDED RELEASE ORAL at 11:53

## 2020-05-09 RX ADMIN — MORPHINE SULFATE 2 MILLIGRAM(S): 50 CAPSULE, EXTENDED RELEASE ORAL at 18:48

## 2020-05-09 RX ADMIN — MORPHINE SULFATE 2 MILLIGRAM(S): 50 CAPSULE, EXTENDED RELEASE ORAL at 05:27

## 2020-05-09 RX ADMIN — Medication 0.5 MILLIGRAM(S): at 11:53

## 2020-05-09 RX ADMIN — FENTANYL CITRATE 1 PATCH: 50 INJECTION INTRAVENOUS at 20:23

## 2020-05-09 NOTE — PROGRESS NOTE ADULT - ASSESSMENT
80yF PMH of CVA, Dementia, HLD and HTN presented to the ED for evaluation of weakness, lethargy and decreased PO intake. Admitted for acute metabolic encephalopathy secondary to COVID-19 Infection.     1) Acute Respiratory Failure with Hypoxia  2) COVID-19 Pneumonia  3) Acute Metabolic Encephalopathy   4) History of CVA / Dementia  5) HTN  6) HLD    ~ Patient is on Comfort Care.  ~ Pending bed at [facility].   ~ Palliative follow up noted.    Dispo: [facility] once bed is available

## 2020-05-09 NOTE — PROGRESS NOTE ADULT - SUBJECTIVE AND OBJECTIVE BOX
COVID-19    HPI:  Patient is a 80yF PMH of CVA, dementia, HLD, HTN presented to the ED for evaluation of weakness, lethargy and decreased PO intake. Unable to obtain history from patient as is poor historian, currently minimally responsive due to lethargy.  Baseline AAOx1 (self).  Per care manager at [facility], for past 24 hrs they noted pt has been increasing lethargic and needed extra assistance with ADL's. Normally requires 1 person to assist but currently required 3 person assist. Normally has good appetite, now not eating or drinking, requiring 1:1 assistance with feeds. Pt also normally verbal but has had AMS and minimally responsive for past 1-2 days. Pt with positive exposure to mulitple roomates with COVOD-19. Pt at bedside states she feels weak and has mild SOB. (27 Apr 2020 13:26)    Interval History:  Patient was seen and examined at bedside. Sleeping comfortably.  As per RN, no overnight issues.     ROS:  As per interval history otherwise unremarkable.    PHYSICAL EXAM:  Vital Signs   HR: 60  RR: 16  General: Elderly female sleeping in bed comfortably. No acute distress  Neuro: Sleeping   Ext: No pedal edema    LABS:  Reviewed    RADIOLOGY & ADDITIONAL STUDIES:  Reviewed     MEDICATIONS  (STANDING):  fentaNYL   Patch  12 MICROgram(s)/Hr 1 Patch Transdermal every 72 hours  LORazepam   Injectable 0.5 milliGRAM(s) IV Push every 6 hours  morphine  - Injectable 2 milliGRAM(s) IV Push every 6 hours    MEDICATIONS  (PRN):  acetaminophen  Suppository .. 650 milliGRAM(s) Rectal every 6 hours PRN Temp greater or equal to 38C (100.4F), Mild Pain (1 - 3)  ALBUTerol    90 MICROgram(s) HFA Inhaler 2 Puff(s) Inhalation every 4 hours PRN Shortness of Breath and/or Wheezing  glycopyrrolate Injectable 0.2 milliGRAM(s) IV Push every 6 hours PRN secretion management  LORazepam   Injectable 0.5 milliGRAM(s) IV Push every 6 hours PRN anxiety restlessness, noncompliance  morphine  - Injectable 2 milliGRAM(s) IV Push every 4 hours PRN dyspnea/pain  morphine  - Injectable 2 milliGRAM(s) IV Push every 4 hours PRN persistentdyspnea/pain

## 2020-05-10 PROCEDURE — 99232 SBSQ HOSP IP/OBS MODERATE 35: CPT

## 2020-05-10 RX ADMIN — Medication 0.5 MILLIGRAM(S): at 18:48

## 2020-05-10 RX ADMIN — Medication 0.5 MILLIGRAM(S): at 05:41

## 2020-05-10 RX ADMIN — FENTANYL CITRATE 1 PATCH: 50 INJECTION INTRAVENOUS at 07:00

## 2020-05-10 RX ADMIN — FENTANYL CITRATE 1 PATCH: 50 INJECTION INTRAVENOUS at 21:11

## 2020-05-10 RX ADMIN — MORPHINE SULFATE 2 MILLIGRAM(S): 50 CAPSULE, EXTENDED RELEASE ORAL at 00:23

## 2020-05-10 RX ADMIN — Medication 0.5 MILLIGRAM(S): at 00:22

## 2020-05-10 RX ADMIN — Medication 0.5 MILLIGRAM(S): at 12:02

## 2020-05-10 RX ADMIN — MORPHINE SULFATE 2 MILLIGRAM(S): 50 CAPSULE, EXTENDED RELEASE ORAL at 12:02

## 2020-05-10 RX ADMIN — MORPHINE SULFATE 2 MILLIGRAM(S): 50 CAPSULE, EXTENDED RELEASE ORAL at 05:41

## 2020-05-10 RX ADMIN — MORPHINE SULFATE 2 MILLIGRAM(S): 50 CAPSULE, EXTENDED RELEASE ORAL at 18:48

## 2020-05-10 NOTE — PROGRESS NOTE ADULT - SUBJECTIVE AND OBJECTIVE BOX
COVID-19    HPI:  Patient is a 80yF PMH of CVA, dementia, HLD, HTN presented to the ED for evaluation of weakness, lethargy and decreased PO intake. Unable to obtain history from patient as is poor historian, currently minimally responsive due to lethargy.  Baseline AAOx1 (self).  Per care manager at [facility], for past 24 hrs they noted pt has been increasing lethargic and needed extra assistance with ADL's. Normally requires 1 person to assist but currently required 3 person assist. Normally has good appetite, now not eating or drinking, requiring 1:1 assistance with feeds. Pt also normally verbal but has had AMS and minimally responsive for past 1-2 days. Pt with positive exposure to mulitple roomates with COVOD-19. Pt at bedside states she feels weak and has mild SOB. (27 Apr 2020 13:26)    Interval History:  Patient was seen and examined at bedside.   No acute issues overnight.   Does not require PRN medications.   Sleeping comfortably.    ROS:  As per interval history otherwise unremarkable.    PHYSICAL EXAM:  Vital Signs   HR: 80  RR: 18  General: Elderly female sleeping in bed comfortably. No acute distress  Neuro: Sleeping   : Bales's catheter in place  Ext: No pedal edema    LABS:  Reviewed    RADIOLOGY & ADDITIONAL STUDIES:  Reviewed     MEDICATIONS  (STANDING):  fentaNYL   Patch  12 MICROgram(s)/Hr 1 Patch Transdermal every 72 hours  LORazepam   Injectable 0.5 milliGRAM(s) IV Push every 6 hours  morphine  - Injectable 2 milliGRAM(s) IV Push every 6 hours    MEDICATIONS  (PRN):  acetaminophen  Suppository .. 650 milliGRAM(s) Rectal every 6 hours PRN Temp greater or equal to 38C (100.4F), Mild Pain (1 - 3)  ALBUTerol    90 MICROgram(s) HFA Inhaler 2 Puff(s) Inhalation every 4 hours PRN Shortness of Breath and/or Wheezing  glycopyrrolate Injectable 0.2 milliGRAM(s) IV Push every 6 hours PRN secretion management  LORazepam   Injectable 0.5 milliGRAM(s) IV Push every 6 hours PRN anxiety restlessness, noncompliance  morphine  - Injectable 2 milliGRAM(s) IV Push every 4 hours PRN dyspnea/pain  morphine  - Injectable 2 milliGRAM(s) IV Push every 4 hours PRN persistentdyspnea/pain

## 2020-05-10 NOTE — PROGRESS NOTE ADULT - ASSESSMENT
80yF PMH of CVA, Dementia, HLD and HTN presented to the ED for evaluation of weakness, lethargy and decreased PO intake. Admitted for acute metabolic encephalopathy secondary to COVID-19 Infection.     1) Acute Respiratory Failure with Hypoxia  2) COVID-19 Pneumonia  3) Acute Metabolic Encephalopathy   4) History of CVA / Dementia  5) HTN  6) HLD    ~ Patient is on Comfort Care.  ~ Pending bed at [facility].   ~ Palliative follow up noted.    Dispo: [facility] once bed is available

## 2020-05-11 PROCEDURE — 84145 PROCALCITONIN (PCT): CPT

## 2020-05-11 PROCEDURE — 87635 SARS-COV-2 COVID-19 AMP PRB: CPT

## 2020-05-11 PROCEDURE — 82962 GLUCOSE BLOOD TEST: CPT

## 2020-05-11 PROCEDURE — 93005 ELECTROCARDIOGRAM TRACING: CPT

## 2020-05-11 PROCEDURE — 85730 THROMBOPLASTIN TIME PARTIAL: CPT

## 2020-05-11 PROCEDURE — 92610 EVALUATE SWALLOWING FUNCTION: CPT

## 2020-05-11 PROCEDURE — 86140 C-REACTIVE PROTEIN: CPT

## 2020-05-11 PROCEDURE — 83615 LACTATE (LD) (LDH) ENZYME: CPT

## 2020-05-11 PROCEDURE — 99231 SBSQ HOSP IP/OBS SF/LOW 25: CPT

## 2020-05-11 PROCEDURE — 84443 ASSAY THYROID STIM HORMONE: CPT

## 2020-05-11 PROCEDURE — 84439 ASSAY OF FREE THYROXINE: CPT

## 2020-05-11 PROCEDURE — 87086 URINE CULTURE/COLONY COUNT: CPT

## 2020-05-11 PROCEDURE — 84080 ASSAY ALKALINE PHOSPHATASES: CPT

## 2020-05-11 PROCEDURE — 36415 COLL VENOUS BLD VENIPUNCTURE: CPT

## 2020-05-11 PROCEDURE — 99239 HOSP IP/OBS DSCHRG MGMT >30: CPT | Mod: GC

## 2020-05-11 PROCEDURE — 82550 ASSAY OF CK (CPK): CPT

## 2020-05-11 PROCEDURE — 71045 X-RAY EXAM CHEST 1 VIEW: CPT

## 2020-05-11 PROCEDURE — 83605 ASSAY OF LACTIC ACID: CPT

## 2020-05-11 PROCEDURE — 97163 PT EVAL HIGH COMPLEX 45 MIN: CPT

## 2020-05-11 PROCEDURE — 84484 ASSAY OF TROPONIN QUANT: CPT

## 2020-05-11 PROCEDURE — 85027 COMPLETE CBC AUTOMATED: CPT

## 2020-05-11 PROCEDURE — 80053 COMPREHEN METABOLIC PANEL: CPT

## 2020-05-11 PROCEDURE — 82553 CREATINE MB FRACTION: CPT

## 2020-05-11 PROCEDURE — 99285 EMERGENCY DEPT VISIT HI MDM: CPT

## 2020-05-11 PROCEDURE — 82728 ASSAY OF FERRITIN: CPT

## 2020-05-11 PROCEDURE — 85379 FIBRIN DEGRADATION QUANT: CPT

## 2020-05-11 PROCEDURE — 80048 BASIC METABOLIC PNL TOTAL CA: CPT

## 2020-05-11 PROCEDURE — 81001 URINALYSIS AUTO W/SCOPE: CPT

## 2020-05-11 PROCEDURE — 70450 CT HEAD/BRAIN W/O DYE: CPT

## 2020-05-11 PROCEDURE — 85610 PROTHROMBIN TIME: CPT

## 2020-05-11 RX ADMIN — Medication 0.5 MILLIGRAM(S): at 05:45

## 2020-05-11 RX ADMIN — MORPHINE SULFATE 2 MILLIGRAM(S): 50 CAPSULE, EXTENDED RELEASE ORAL at 00:23

## 2020-05-11 RX ADMIN — MORPHINE SULFATE 2 MILLIGRAM(S): 50 CAPSULE, EXTENDED RELEASE ORAL at 05:45

## 2020-05-11 RX ADMIN — FENTANYL CITRATE 1 PATCH: 50 INJECTION INTRAVENOUS at 07:26

## 2020-05-11 RX ADMIN — Medication 0.5 MILLIGRAM(S): at 11:55

## 2020-05-11 RX ADMIN — MORPHINE SULFATE 2 MILLIGRAM(S): 50 CAPSULE, EXTENDED RELEASE ORAL at 11:56

## 2020-05-11 RX ADMIN — Medication 0.5 MILLIGRAM(S): at 00:24

## 2020-05-11 NOTE — DISCHARGE NOTE NURSING/CASE MANAGEMENT/SOCIAL WORK - PATIENT PORTAL LINK FT
You can access the FollowMyHealth Patient Portal offered by [facility] by registering at the following website: [email or web address]. By joining [facility]’s FollowMyHealth portal, you will also be able to view your health information using other applications (apps) compatible with our system.

## 2020-05-11 NOTE — PROGRESS NOTE ADULT - ATTENDING COMMENTS
COUNSELING:    Face to face meeting to discuss Advanced Care Planning - Time Spent __10____ Minutes.  Spoke to  Maurisio, who wanted an update, and if his                                                                                                                                  wife would be moved  to [facility] today  More than 50% time spent in counseling and coordinating care. ___10___ Minutes.                                                                                As it turns out Patient is going to move to [facility] this afternoon  notified and agrees to same    Thank you for the opportunity to assist with the care of this patient.   [facility] Consult [facility] [phone].
COUNSELING:    Face to face meeting to discuss Advanced Care Planning - Time Spent ______ Minutes.  See goals of care note.    More than 50% time spent in counseling and coordinating care. _20____ Minutes.     Thank you for the opportunity to assist with the care of this patient.   [facility] Consult [facility] [phone].
COUNSELING:    Face to face meeting to discuss Advanced Care Planning - Time Spent ______ Minutes.  See goals of care note.    More than 50% time spent in counseling and coordinating care. __15____ Minutes.     Thank you for the opportunity to assist with the care of this patient.   [facility] Consult [facility] [phone].
COUNSELING:    Face to face meeting to discuss Advanced Care Planning - Time Spent ______ Minutes.  See goals of care note.    More than 50% time spent in counseling and coordinating care. __15____ Minutes.     Thank you for the opportunity to assist with the care of this patient.   [facility] Consult [facility] [phone].
COUNSELING:    Face to face meeting to discuss Advanced Care Planning - Time Spent ______ Minutes.  See goals of care note.    More than 50% time spent in counseling and coordinating care. ___30___ Minutes.     Thank you for the opportunity to assist with the care of this patient.   [facility] [facility] Consult Service [phone].
COUNSELING:    Phone  Face to Face meeting to discuss Advanced Care Planning - Time Spent ___20___ Minutes.  See goals of care note.    More than 50% time spent in counseling and coordinating care. _20_____ Minutes.     Thank you for the opportunity to assist with the care of this patient.   [facility] Consult [facility] [phone].
Patient was seen and examined at bedside. Sleeping comfortably in bed. No acute distress.   Continue current treatment.  Plan for [facility].
Patient was seen and examined at bedside. Sleeping comfortably. Not requiring any PRN medications.  Continue current treatment.  D/C to [facility] once bed is available.

## 2020-05-11 NOTE — GOALS OF CARE CONVERSATION - ADVANCED CARE PLANNING - CONVERSATION/DISCUSSION
Hospice Referral
Diagnosis/MOLST Discussed/Prognosis/Treatment Options
Prognosis/Treatment Options/Diagnosis/MOLST Discussed/Hospice Referral
Prognosis/Treatment Options/Diagnosis

## 2020-05-11 NOTE — GOALS OF CARE CONVERSATION - ADVANCED CARE PLANNING - NS PRO AD PATIENT TYPE
Do Not Resuscitate (DNR)/Medical Orders for Life-Sustaining Treatment (MOLST)/not on chart[facility]
Medical Orders for Life-Sustaining Treatment (MOLST)/Do Not Resuscitate (DNR)/not on chart- requesting copy from [facility]
Do Not Resuscitate (DNR)/Medical Orders for Life-Sustaining Treatment (MOLST)/not on chart- [facility]
not on chart- requesting copy from [facility]/Medical Orders for Life-Sustaining Treatment (MOLST)/Do Not Resuscitate (DNR)
not on chart- requesting copy from [place]/Medical Orders for Life-Sustaining Treatment (MOLST)/Do Not Resuscitate (DNR)

## 2020-05-11 NOTE — PROGRESS NOTE ADULT - ASSESSMENT
80yF PMH of CVA, Dementia, HLD and HTN presented to the ED for evaluation of weakness, lethargy and decreased PO intake. Admitted for acute metabolic encephalopathy secondary to COVID-19 Infection.     1) Acute Respiratory Failure with Hypoxia  2) COVID-19 Pneumonia  3) Acute Metabolic Encephalopathy   4) History of CVA / Dementia  5) HTN  6) HLD    ~ Patient is on Comfort Care.  ~ Pending bed at [facility].   ~ Palliative follow up noted.    Dispo: [facility] once bed is available

## 2020-05-11 NOTE — PROGRESS NOTE ADULT - SUBJECTIVE AND OBJECTIVE BOX
CC: AMS/lethargy/COVID    INTERVAL HPI/OVERNIGHT EVENTS: Patient on comfort care, minimally responsive to noxious stimuli. Appears comfortable.     Vital Signs Last 24 Hrs  T(C): --  T(F): --  HR: --  BP: --  BP(mean): --  RR: --  SpO2: --  I&O's Detail    PHYSICAL EXAM:  Vital Signs   HR: 80  RR: 18  General: Obtunded. No acute distress  Neuro: Unresponsive  : Bales's catheter in place  Ext: No pedal edema    10 May 2020 07:01  -  11 May 2020 07:00  --------------------------------------------------------  IN:  Total IN: 0 mL    OUT:    Voided: 1300 mL  Total OUT: 1300 mL    Total NET: -1300 mL                        CAPILLARY BLOOD GLUCOSE              MEDICATIONS  (STANDING):  fentaNYL   Patch  12 MICROgram(s)/Hr 1 Patch Transdermal every 72 hours  LORazepam   Injectable 0.5 milliGRAM(s) IV Push every 6 hours  morphine  - Injectable 2 milliGRAM(s) IV Push every 6 hours    MEDICATIONS  (PRN):  acetaminophen  Suppository .. 650 milliGRAM(s) Rectal every 6 hours PRN Temp greater or equal to 38C (100.4F), Mild Pain (1 - 3)  ALBUTerol    90 MICROgram(s) HFA Inhaler 2 Puff(s) Inhalation every 4 hours PRN Shortness of Breath and/or Wheezing  glycopyrrolate Injectable 0.2 milliGRAM(s) IV Push every 6 hours PRN secretion management  LORazepam   Injectable 0.5 milliGRAM(s) IV Push every 6 hours PRN anxiety restlessness, noncompliance  morphine  - Injectable 2 milliGRAM(s) IV Push every 4 hours PRN dyspnea/pain  morphine  - Injectable 2 milliGRAM(s) IV Push every 4 hours PRN persistentdyspnea/pain      RADIOLOGY & ADDITIONAL TESTS: CC: AMS/lethargy/COVID    INTERVAL HPI/OVERNIGHT EVENTS: Patient on comfort care, minimally responsive to noxious stimuli. Appears comfortable.     PHYSICAL EXAM:  Vital Signs   HR: 84  RR: 16  General: Obtunded. No acute distress  Neuro: Unresponsive  : Bales's catheter in place  Ext: No pedal edema    10 May 2020 07:01  -  11 May 2020 07:00  --------------------------------------------------------  IN:  Total IN: 0 mL    OUT:    Voided: 1300 mL  Total OUT: 1300 mL    Total NET: -1300 mL    MEDICATIONS  (STANDING):  fentaNYL   Patch  12 MICROgram(s)/Hr 1 Patch Transdermal every 72 hours  LORazepam   Injectable 0.5 milliGRAM(s) IV Push every 6 hours  morphine  - Injectable 2 milliGRAM(s) IV Push every 6 hours    MEDICATIONS  (PRN):  acetaminophen  Suppository .. 650 milliGRAM(s) Rectal every 6 hours PRN Temp greater or equal to 38C (100.4F), Mild Pain (1 - 3)  ALBUTerol    90 MICROgram(s) HFA Inhaler 2 Puff(s) Inhalation every 4 hours PRN Shortness of Breath and/or Wheezing  glycopyrrolate Injectable 0.2 milliGRAM(s) IV Push every 6 hours PRN secretion management  LORazepam   Injectable 0.5 milliGRAM(s) IV Push every 6 hours PRN anxiety restlessness, noncompliance  morphine  - Injectable 2 milliGRAM(s) IV Push every 4 hours PRN dyspnea/pain  morphine  - Injectable 2 milliGRAM(s) IV Push every 4 hours PRN persistentdyspnea/pain

## 2020-05-11 NOTE — PROGRESS NOTE ADULT - ASSESSMENT
81 yo F with PMH of Dementia  admitted COVID +, prolonged hospitalization for Acute Hypoxic Respiratory Failure    COVID PNA  Acute respiratory Failure Hypoxia  Continued to need Nasal O2  Cough  airway clear  Morphine ATC IVP providing relief for dyspnea and pain    AMS  Hypoactive Delirium  Actively Dying  On comfort care  Will transfer to [facility] later today    Dysphagia  May have aspirated causing superimposed PNA  NPO for days, Patient had stopped eating and drinking  Aspiration Precautions  Mouth care    GOC    DNR/DNI  Continue Oxygenation  Comfort care  Morphine and Ativan  Transfer to [facility] today   has been notified of her imminent discharge

## 2020-05-11 NOTE — GOALS OF CARE CONVERSATION - ADVANCED CARE PLANNING - NS PRO AD PATIENT TYPE ON CHART
Medical Orders for Life-Sustaining Treatment (MOLST)/Do Not Resuscitate (DNR)
Do Not Resuscitate (DNR)/Medical Orders for Life-Sustaining Treatment (MOLST)
Do Not Resuscitate (DNR)/Medical Orders for Life-Sustaining Treatment (MOLST)

## 2020-05-11 NOTE — GOALS OF CARE CONVERSATION - ADVANCED CARE PLANNING - CONVERSATION DETAILS
SW in contact with Carmen [phone] at [facility] to review directives on file at residence. patient only had non hospital DNR completed. Sw contacted patients spouse to offer support and address concerns. Spouse  Maurisio actively engaged with SW and review his coming t terms with projected loss. Spouse reports difficulty over time with progressive dementia changes and continues to work . At present working from home is means for him to keep his mind from wandering with thoughts. SW encouraged utilization of COVID Emotionally Support Hotline and provided information.
Patient approved for [facility] 3[facility] today. Celsa Connor NP and Dr. Leong notified and ambulance  requested. [facility] RN spoke to patient's , Maurisio, and reviewed hospice consent forms.  Maurisio agrees with hospice plan of care.    Kristy Fernandez RN  [phone]
I called  Maurisio, introduced myself and rationale for Palliative Support.  Maurisio provided me with background information about his wife Divya.    She has been a resident of [facility] past two years, and has been very happy Living  there.    She has been calm and cooperative, shrot term memory loss, was verbal and engaging. Limited to WC at facility.    Fairly sudden onset of AMS, weakness not eating or drinking SOB    She has Directives already set up at [facility]-no documentation on our chart.  I reconfirmed that his wife is DNR/DNI, and have asked forr help in calling [facility] to ask them to fax documentation they have on file.    Asked what are his GOC, Maurisio replied to actively treat her acute PNA and symptoms of discomfort.    He does not want her intubated or supported on a Vent.    He is hopeful she will ride this out and be stable for discharge back to [facility] soon. At the same time he is realistic; given her  advanced age and history of CVA and Dementia.    He would like to speak to , and be fully informed about his wife.s progress
I spoke with Eugeniese Forde early this morning, I arranged for him to visit his wife's bedside, after getting Charge Nurse permission to do so. Then met him on 5T to assist him to gown/glove weaing a mask.  She was moved to a private room-isolation, she was very lethargic turning her head and moaning-but not able to pen her   eyes or speak.    She was resting comfortably, in no distress.    Intermittent irregular breathing pattern.    We discussed Hospice [facility], and possibly moving her to In-Pt unit if a bed becomes available.  He thought that would be fine.    Wait list for a bed at [facility] today
I called  Maurisio, with whom I have cultivated a rapport since Patient's admission.    I gently broke the news, that his wife, continues to decline not taking anything to eat or drink, not able to swallow medications,  Very uncomfortable, screaming out when she is touched or attempts to reposition or provide physical care are made.    I reviewed her clinical condition, reminded him that she has developed B/L Lung opacities seen on XRAY two days ago.  She is very weak, and suffering- dependent on Nasal O2.  When she was given a small martini of Morphine this morning, she rested and seemed more comfortable.    I explained what comfort care would mean, no more blood draws, or continuous monitoring.   More aggressive symptom management.    I will try to have Patient moved into private room in hospital, for comfort and privacy. This move will also make it possible for someone from family to visit her while she is still alive.    He agreed to make her comfortable, understood why it was for the best. He had a feeling this wasn't going to work out, and  he tried to hope for the best

## 2020-05-11 NOTE — PROGRESS NOTE ADULT - REASON FOR ADMISSION
AMS/lethargy

## 2020-05-11 NOTE — PROGRESS NOTE ADULT - SUBJECTIVE AND OBJECTIVE BOX
INTERVAL HPI/OVERNIGHT EVENTS: 81 yo Female Patient PMH of Dementia, +COVID 19 PNA, superimposed secondary infection Hypoxic Respiratory Failure  F/U Note:  Patient is in a deep coma, Non verbal Calm and appears comfortable  Breathing shallow, Airway clear  Patient tolerating Nasal O2  On comfort care- waiting for bed at [facility]     80y old  Female who presents with a chief complaint of AMS/lethargy (11 May 2020 10:13)  PAST MEDICAL & SURGICAL HISTORY:  CVA (cerebral vascular accident)  Dementia  Hypertension  S/P colonoscopy with polypectomy  S/P inguinal hernia repair: Bilateral    Present Symptoms:     Dyspnea:   2    Nausea/Vomiting:  No  Anxiety:   No  Depression: No  Fatigue: Yes   Loss of appetite: Yes Stopped eating last week    Pain: had C/O back pain when able to self report            Character-            Duration-            Effect-            Factors-            Frequency-            Location-            Severity-    Review of Systems: Reviewed                     Unable to obtain due to poor mentation     MEDICATIONS  (STANDING):  fentaNYL   Patch  12 MICROgram(s)/Hr 1 Patch Transdermal every 72 hours  LORazepam   Injectable 0.5 milliGRAM(s) IV Push every 6 hours  morphine  - Injectable 2 milliGRAM(s) IV Push every 6 hours    MEDICATIONS  (PRN):  acetaminophen  Suppository .. 650 milliGRAM(s) Rectal every 6 hours PRN Temp greater or equal to 38C (100.4F), Mild Pain (1 - 3)  ALBUTerol    90 MICROgram(s) HFA Inhaler 2 Puff(s) Inhalation every 4 hours PRN Shortness of Breath and/or Wheezing  glycopyrrolate Injectable 0.2 milliGRAM(s) IV Push every 6 hours PRN secretion management  LORazepam   Injectable 0.5 milliGRAM(s) IV Push every 6 hours PRN anxiety restlessness, noncompliance  morphine  - Injectable 2 milliGRAM(s) IV Push every 4 hours PRN dyspnea/pain  morphine  - Injectable 2 milliGRAM(s) IV Push every 4 hours PRN persistentdyspnea/pain      PHYSICAL EXAM: Due to the nature of this patient's COVID-19 isolation status, no bedside physical exam was done to limit spread of infection. Examination highlights were provided by bedside nurse and primary team. Objective data were reviewed in detail.    LABS:    I&O's Summary    10 May 2020 07:01  -  11 May 2020 07:00  --------------------------------------------------------  IN: 0 mL / OUT: 1300 mL / NET: -1300 mL    RADIOLOGY & ADDITIONAL STUDIES:    ADVANCE DIRECTIVES:   DNR YES  Completed on:                     MANPREET  YES    Completed on:  Living Will  NO   Completed on:

## 2020-05-12 LAB
ALKALINE PHOSPHATASE INTERPRETATION: SIGNIFICANT CHANGE UP
ALP BONE SERPL-MCNC: 27 % — LOW (ref 28–66)
ALP FLD-CCNC: 304 U/L — HIGH (ref 37–153)
ALP INTEST CFR SERPL: 0 % — LOW (ref 1–24)
ALP LIVER SERPL-CCNC: 31 % — SIGNIFICANT CHANGE UP (ref 25–69)
ALP MACRO CFR SERPL: 42 % — HIGH
ALP PLAC SERPL-CCNC: 0 % — SIGNIFICANT CHANGE UP

## 2020-12-20 NOTE — PHYSICAL THERAPY INITIAL EVALUATION ADULT - LEVEL OF INDEPENDENCE: SCOOT/BRIDGE, REHAB EVAL
Addendum  created 12/19/20 1858 by Ty Coronado APRN CRNA    Intraprocedure Event edited      
maximum assist (25% patients effort)

## 2021-02-15 NOTE — ED ADULT NURSE NOTE - NSFALLRSKINDICATORS_ED_ALL_ED
Break coverage RN- Received Pt in room 19. pt A&Ox3, ambulatory. Pt with hx of sickle cell disease. Pt c/o pain to legs arms and neck for the past 2 weeks, reports feels like crisis pain.  Pt also c/o vaginal bleeding x 2 months. Pt appears stable and in no apparent distress at this time. respirations are equal and nonlabored, no respiratory distress noted. Pt placed on cardiac monitor. denies any chest pain, sob, cough, fever/chills, headache, dizziness, nausea, vomiting. 22 gauge iv placed in the left hand, labs sent. pt medicated as per orders. pt stable, awaiting further plan
yes

## 2021-07-15 NOTE — ED ADULT TRIAGE NOTE - SPO2 (%)
Instructions: This plan will send the code FBSE to the PM system.  DO NOT or CHANGE the price. Price (Do Not Change): 0.00 Detail Level: Simple 98

## 2022-09-19 NOTE — ED ADULT NURSE NOTE - CAS DISCH CONDITION
Chief Complaint   Patient presents with    Well Child       2Year old Well Child Visit    History was provided by the parent. Carol Mcdowell is a 2 y.o. female who is brought in for this well child visit. Interval Concerns: several concerns today  Had exposure to RSV two weeks ago and started immediately after with congestion cough rhinorrhea  Was like that for the past two weeks and finally seen at urgent care a few days ago  Cough still   Congestion rhinorrhea worse at night  Hx of allergies not taking zyrtec daily  No rashes  No shortness of breath or wheezing  Picky eater, very difficult to get her to eat solids  Likes milk, still drinking in a bottle  Reviewed urgent care evaluation and tx recommendations  Neg covid and strep flu testing there  Tx with augmentin for sinus infection  Doing okay still coughing   No fever    Discussed prior evaluations with dad as well  No official dx of autism at last appt at [facility] delay working on that  Still showing some concerning signs per teachers so will be re-evaluated in a year  At home points waves, follows commands responds to name    ROS denies any fevers, changes in mental status, ear discharge,  sore throat, shortness of breath, wheezing, abdominal pain, or distention, diarrhea, constipation, changes in urine output,   rashes, bruises, petechiae or any other lesions. Past Medical History:   Diagnosis Date    Hyperbilirubinemia     t bili 10, , sent home on bili blanket     screening tests negative     Passed hearing screening          Speech delay      History reviewed. No pertinent surgical history.     Family History   Problem Relation Age of Onset    No Known Problems Mother     Hypertension Father     Hypertension Maternal Grandmother     Hypertension Maternal Grandfather     Diabetes Paternal Grandmother     Hypertension Paternal Grandmother     Breast Cancer Paternal Grandmother     Diabetes Paternal Grandfather     Hypertension Paternal Grandfather          Diet: varied well balanced    Social/School:  getting speech       Sleep : appropriate for age     Screening: Vision/Hearing - assessed   No results found. Blood pressure - assessed    Hyperlipidemia, risk - assessed      Development:   Developmental 4 Years Appropriate    Can wash and dry hands without help Yes Yes on 9/19/2022 (Age - 3yrs)    Correctly adds 's' to words to make them plural Yes Yes on 9/19/2022 (Age - 3yrs)    Can balance on 1 foot for 2 seconds or more given 3 chances No No on 9/19/2022 (Age - 3yrs)    Can copy a picture of a [place] Yes Yes on 9/19/2022 (Age - 3yrs)    Can stack 8 small (< 2\") blocks without them falling Yes Yes on 9/19/2022 (Age - 3yrs)    Plays games involving taking turns and following rules (hide & seek,  & robbers, etc.) Yes Yes on 9/19/2022 (Age - 3yrs)    Can put on pants, shirt, dress, or socks without help (except help with snaps, buttons, and belts) No No on 9/19/2022 (Age - 3yrs)    Can say full name No No on 9/19/2022 (Age - 3yrs)       Hops, skips, alternates feet: yes  down steps: yes  Copies square, buttons clothing:  yes  Catches ball yes  Knows colors (4 or more) yes  plays cooperatively with a group of children, yes  Speech understandable: has speech therapy seen by developmental peds and no autism   Has dx of developmental delay  Pending re-evaluation of autism in a year   draws a person with 3 parts no  copies a cross no  brushes own teeth yes  dresses selfyes. Visit Vitals  BP 92/58 (BP 1 Location: Left upper arm, BP Patient Position: Sitting, BP Cuff Size: Child)   Pulse 139   Temp 97.8 °F (36.6 °C) (Oral)   Resp 18   Ht (!) 3' 2\" (0.965 m)   Wt 29 lb 3.2 oz (13.2 kg)   SpO2 100%   BMI 14.22 kg/m²       Growth parameters are noted and are appropriate for age. Appears to respond to sounds: yes  Vision screening done: yes      General:   alert, cooperative, no distress, appears stated age.   Appears well hydrated cap refill < 3sec quiet good eye contact, follows commands on dads lap the whole time   Gait:   normal   Skin:   normal   Oral cavity:   Lips, mucosa, and tongue normal. Teeth and gums normal   Eyes:   sclerae white, pupils equal and reactive, red reflex normal bilaterally, conjugate gaze, No exotropia or esotropia noted bilat. Nose: patent   Ears:   normal bilateral   Neck:   supple, symmetrical, trachea midline, no adenopathy. Thyroid: no tenderness/mass/nodules   Lungs:  clear to auscultation bilaterally, no w/r/r   Heart:   regular rate and rhythm, S1, S2 normal, no murmur, click, rub or gallop   Abdomen:  soft, non-tender. Bowel sounds normal. No masses,  no organomegaly   :  normal female -  SMR1   Extremities:   extremities normal, atraumatic, no cyanosis or edema. Good ROM in all extremities b/l and symmetrically. Neuro:   good muscle bulk and tone. 5/5 strength in all extremities  JASPAL  reflexes normal and symmetric at the patella and ankle  gait and station normal     Elements of physical exam pertinent to acute visit encounter bolded     Results for orders placed or performed in visit on 09/19/22   AMB POC [employer] SPOT VISION SCREENER    Narrative    Astigmatism OD, OS   AMB POC AUDIOMETRY (WELL)   Result Value Ref Range    125 Hz, Right Ear      250 Hz Right Ear      500 Hz Right Ear pass     1000 Hz Right Ear pass     2000 Hz Right Ear pass     4000 Hz Right Ear pass     8000 Hz Right Ear      125 Hz Left Ear      250 Hz Left Ear      500 Hz Left Ear pass     1000 Hz Left Ear pass     2000 Hz Left Ear pass     4000 Hz Left Ear pass     8000 Hz Left Ear         Assessment:       ICD-10-CM ICD-9-CM    1. Encounter for vision screening  Z01.00 V72.0 AMB POC AUDIOMETRY (WELL)      AMB POC [employer] ANURAG SPOT VISION SCREENER      2. Encounter for routine child health examination without abnormal findings  B28.570 V20.2       3.  Failed vision screen  Z01.01 796.4 REFERRAL TO PEDIATRIC OPHTHALMOLOGY      4. Encounter for hearing examination without abnormal findings  Z01.10 V72.19 AMB POC VISUAL ACUITY SCREEN      5. BMI (body mass index), pediatric, 5% to less than 85% for age  Z76.54 V80.54       8. Speech delay  F80.9 315.39       7. Development delay  R62.50 783.40 REFERRAL TO PEDIATRIC GASTROENTEROLOGY      8. Acute sinusitis, recurrence not specified, unspecified location  J01.90 461.9       9. Environmental allergies  Z91.09 V15.09       10. Picky eater  R63.39 783.3 REFERRAL TO PEDIATRIC GASTROENTEROLOGY          1/2/3/4 /5/6/7 Healthy 2 y.o. 0 m.o. old exam.  Milestones with concern re devept. Receiving speech. Will be re evaluated for autism. Due for MMR#2, Varicella #2 and Kinrix (DTaP/IPV) as well as influenza . Immunizations discussed with parent. All questions asked were answered. Side effects and benefits of antigens discussed. Vision and hearing screen completed , failed vision. Referred today   The patient and parent were counseled regarding nutrition and physical activity. School forms filled out and copies made for scanning into the chart    8/9 discussed recent UC visit at length as well as symptoms evaluation and tx recommendations  Complete antibiotics as prescribed  Went over proper medication use and side effects  Take zyrtec daily instead of as needed for the next month   Supportive measures including plenty of fluids and solids as tolerated, tylenol (15mg/kg q6hrs) or motrin (10mg/kg q8hrs) as needed for pain/fevers, vaporizer to aid with symptomatic relief of nasal congestion/cough symptoms. Went over signs and symptoms that would warrant evaluation in the clinic once again or urgent/emergent evaluation in the ED. Dad voiced understanding and agreed with plan.      10 discussed picky eating habits and feeding clinic referral at length today again  Referral given again    On this date 09/19/2022 I have spent 33 minutes aside from this well child check discussing current symptoms of congestion rhinorrhea cough sinus infection, allergies, evaluation and tx recommendations, picky eating behaviors in relationship to concerns for autism, and developmental delay, as well as evaluation and tx recommendations, reviewing previous notes, test results and face to face with the patient discussing the diagnosis and importance of compliance with the treatment plan as well as documenting on the day of the visit. Plan and evaluation (above) reviewed with pt/parent(s) at visit  Parent(s) voiced understanding of plan and provided with time to ask/review questions. After Visit Summary (AVS) provided to pt/parent(s) after visit with additional instructions as needed/reviewed.       Plan:      Anticipatory guidance: \"wind-down\" activities to help w/sleep, importance of regular dental care, discipline issues: limit-setting, positive reinforcement, reading together; limiting TV; media violence, [employer] or other , \"child-proofing\" home with cabinet locks, outlet plugs, window guards and stair, caution with possible poisons (inc. pills, plants, cosmetics), Ipecac and Poison Control # [phone], never leave unattended, teaching pedestrian safety, bicycle helmets, safe storage of any firearms in the home, teaching child name address, & phone #, teaching child how to deal with strangers      Follow-up Information    None         Cuate Quick, DO Improved

## 2022-10-27 NOTE — H&P ADULT - ATTENDING COMMENTS
Ira I have personally seen and examined patient on the above date.  I discussed the case with MD Resident Romero and I agree with findings and plan as detailed per note above, which I have amended where appropriate.

## 2023-01-03 NOTE — ED ADULT TRIAGE NOTE - DIRECT TO ROOM CARE INITIATED:
I will increase levothyroxine 137mcg 1 tablet every day Mon to Sat and 1/2 tab on Sunday.( Has been doing levothyroxine 137 mcg 1 tab Mon to Sat only)  Goal TSH is 0.5-2.5.  Goal with pregnancy is less than 2.0.  Recheck TSH in 2 months and 6 months.  RTC in 18 months.    FOLLOW-UP  It is recommended you schedule a follow-up appointment with Dr. Beltran around No follow-ups on file..    Office hours are 8:00 am to 5:00 pm Monday through Friday.  If it is urgent that you speak with someone outside of these hours, the [facility] will be able to assist you.  You can reach the office by calling [phone].    Thank you for choosing Leena Beltran MD as your Endocrinology provider.    At [facility], one important tool we use to improve our patient services is our Patient Survey.  Following your visit you may receive our survey in the mail.     Please take the time to complete the survey.     If your visit with us was great, we want to hear about it.     If we can improve, please let us know how.    
27-Apr-2020 09:40

## 2023-03-18 NOTE — PROGRESS NOTE ADULT - NSTELEHEALTH_GEN_ALL_CORE
Orders for admission, patient is aware of plan and ready to go upstairs. Any questions, please call ED RN Shiloh Cruz  at extension [phone]. Vaccinated?  Yes  Type of COVID test sent: Rapid PCR  COVID Suspicion level: Low      Titratable drug(s) infusing:  Rate:    LOC at time of transport: [facility]    Other pertinent information: NPO    CIWA score=N/A  NIH score= N/A
No

## 2023-03-20 NOTE — DISCHARGE NOTE ADULT - CARE PROVIDERS DIRECT ADDRESSES
What Type Of Note Output Would You Prefer (Optional)?: Bullet Format
Hpi Title: Evaluation of Skin Lesions
,DirectAddress_Unknown

## 2023-04-23 NOTE — H&P ADULT - RS GEN PE MLT RESP DETAILS PC
clear to auscultation bilaterally/no wheezes/respirations non-labored
FAMILY HISTORY:  No pertinent family history in first degree relatives

## 2023-06-22 NOTE — ED ADULT NURSE NOTE - INTEGUMENTARY WDL
[Follow-Up: _____] : a [unfilled] follow-up visit [Home] : at home, [unfilled] , at the time of the visit. [Medical Office: ([facility])___] : at the medical office located in  [Patient] : the patient [Self] : self Color consistent with ethnicity/race, warm, dry intact, resilient.

## 2023-09-27 NOTE — PHYSICAL THERAPY INITIAL EVALUATION ADULT - LEVEL OF INDEPENDENCE: STAIR NEGOTIATION, REHAB EVAL
Reason for Call:  Other call back    Detailed comments: Pt calling asking what might she expect may be done at her visit tomorrow for endometrial mass.    Phone Number Patient can be reached at: Home number on file [phone] (home)    Best Time:     Can we leave a detailed message on this number? Not Applicable    Call taken on 9/27/2023 at 12:10 PM by Nancy Avelar      
Return call to patient.  Spoke with patient on the phone.  All questions answered.  Reviewed appointment will be a consul with a possible EMB and pelvic exam. Patient may take Ibuprofen and/or Tylenol 30 minutes prior to appointment     Pt in agreement and reports understanding.    Emily KHALIL   [facility]     
unable to perform

## 2023-10-13 NOTE — PHYSICAL THERAPY INITIAL EVALUATION ADULT - NS ASR EQUIP WC DETAIL PEDS
Eliseo Guerin(Attending)
elevating leg rests/wheelchair with elevating leg rests with calf pads and pressure alleviating seat cushion (ie roho) as pt will be sitting in chair > 4 hours and will be primary means of functional mobility,

## 2023-11-22 NOTE — ED ADULT TRIAGE NOTE - SPO2 (%)
November 22, 2023     Emmanuel Brown DO  [address] [facility] [place] [address]    Patient: Dragan Calvin   YOB: 1941   Date of Visit: 11/22/2023       Dear Dr. Emmanuel Brown DO:    Thank you for referring Dragan Calvin to me for evaluation. Below are my notes for this consultation.  If you have questions, please do not hesitate to call me. I look forward to following your patient along with you.       Sincerely,     Barrett Carter MD      CC: No Recipients  ______________________________________________________________________________________    History Of Present Illness  HPI   The patient is an 82-year-old male who was admitted to the hospital on October 31, 2023 with a small bowel obstruction.  His obstruction resolved with nonoperative treatment and he was discharged home on November 2, 2023.  He was treated for a previous small bowel obstruction 5 years ago and it also resolved with nonoperative treatment.  He has no abdominal pain.  Tolerating regular diet without nausea or vomiting.  He is having normal bowel movements.    Past medical history:   Atrial fibrillation on Eliquis  Coronary artery disease  DVT 10 years ago  IVC filter placement  Hypertension  Pacemaker placement  Diabetes mellitus  Laparoscopic cholecystectomy  Open appendectomy    Past Medical History  He has a past medical history of Deep vein thrombosis (DVT) of lower extremity (CMS/HCC) (09/17/2023), History of falling, Hyperlipidemia (08/28/2023), Hypertension, benign (08/23/2017), Leg edema (09/05/2023), Overweight, Paroxysmal atrial fibrillation (CMS/HCC) (08/28/2023), Permanent atrial fibrillation (CMS/HCC) (10/17/2023), Personal history of other diseases of the digestive system, Personal history of other infectious and parasitic diseases, Personal history of other specified conditions, Personal history of other specified conditions, Personal history of peptic ulcer disease, Personal history of pneumonia (recurrent), Personal  "history of urinary calculi, Presence of cardiac pacemaker (08/25/2017), and Sick sinus syndrome (CMS/HCC) (09/17/2023).    Surgical History  He has a past surgical history that includes Cardiac pacemaker placement (04/05/2018); Cholecystectomy (04/05/2018); and Appendectomy (07/25/2018).     Allergies  Patient has no known allergies.    Social History  He reports that he has never smoked. He has never used smokeless tobacco. He reports that he does not drink alcohol and does not use drugs.    Family History  Family History   Problem Relation Name Age of Onset   • Coronary artery disease Father     • Heart failure Father     • Diabetes Father         Last Recorded Vitals  Blood pressure 97/60, pulse 101, temperature 36.2 °C (97.2 °F), temperature source Temporal, resp. rate 16, height 1.727 m (5' 8\"), weight 95.8 kg (211 lb 3.2 oz), SpO2 95 %.    Physical Exam  Well-developed, well-nourished, no acute distress, alert and oriented  Abdomen: Nondistended, soft, nontender       Assessment/Plan  Diagnoses and all orders for this visit:  History of small bowel obstruction      Obstruction resolved.  Follow-up as needed.         Barrett Carter MD  " 98

## 2024-01-30 NOTE — PATIENT PROFILE ADULT - HOW PATIENT ADDRESSED, PROFILE
Caller: Karol Spears    Relationship to patient: Mother    Best call back number: [phone]    Chief complaint: WELL CHILD AND VACCINATIONS    Type of visit: WELL CHILD    Requested date: ASAP     Additional notes:PATIENT HAS BEEN SCHEDULED WITH A PROVIDER WHOM IS NOT PCP           
FYI  
Mom has been notified and placed on schedule for next week  
Divya

## 2024-04-18 NOTE — SWALLOW BEDSIDE ASSESSMENT ADULT - ORAL PHASE
Physical Therapy Visit    Visit Type: Daily Treatment Note  Visit: 5  Referring Provider: Cindi Szymanski MD  Referring Provider: Jonas Serrano MD  Medical Diagnosis (from order): M70.71 - Bursitis of right hip, unspecified bursa  Z98.890, Z87.81 - S/P ORIF (open reduction internal fixation) fracture     SUBJECTIVE                                                                                                               Flores paul scale utilized--patient rating pain 2/10 in the right hip. Denies falls since last visit. Admits to poor HEP compliance, states her pain has been a bit higher, she states she is blaming it on the rain.     Pain / Symptoms  - Pain rating (out of 10): Current: 2   - Location: Right hip  - Quality / Description: unable to specify    Location: Bilateral knees  - Pain rating (out of 10):   Current: 4      OBJECTIVE                                                                                                                                     Treatment     Therapeutic Exercise  - Recumbent bike x 7 minute lvl 0 seat 10 active warm up while subjective was taken     - Sit to stand; 2 x 5 reps elevated EOB, no UE assist (not performed)  - Standing hip abduction with red tband // bars; 2 x 10 reps ea   - Glute bridges; 2 x 10 reps     Seated rest breaks throughout session for recovery; patient benefits from additional time to transition between interventions.     Reviewed the benefits and importance of HEP compliance.     Therapeutic Activity  - Step ups on/off airex pad // bars; 2 x 10 reps   - Lateral stepping over airex pad bilaterally // bars; x10 reps ea     Skilled input: verbal instruction/cues, demonstration, tactile instruction/cues and posture correction    Writer verbally educated and received verbal consent for hand placement, positioning of patient, and techniques to be performed today from patient for hand placement and palpation for techniques and therapist position for  techniques as described above and how they are pertinent to the patient's plan of care.  Home Exercise Program  - Glute bridges   - Standing hip abduction or lateral stepping with tband  - Standing marching   - Knee flexion/extension       ASSESSMENT                                                                                                            Continues to present with right hip pain and unsteady gait impacting her ability to complete daily tasks. Patient's pain levels have been variable over last few days--she is attributing this to the recent rain and weather changes. Admits to HEP non-compliance. Reviewed glute bridging today. Initiated stepping activities on complaint surfaces to challenge balance and stability.      Patient will continue to benefit from skilled physical therapy to promote improved function in the home and community environments.   Education:   - Results of above outlined education: Verbalizes understanding    PLAN                                                                                                                           Suggestions for next session as indicated: Progress per plan of care       Therapy procedure time and total treatment time can be found documented on the Time Entry flowsheet     +oral holding; frequent tactile cueing provided for pt to manipulate bolus with inconsistent response/Delayed oral transit time/Decreased anterior-posterior movement of the bolus Within functional limits

## 2025-01-16 NOTE — DISCHARGE NOTE NURSING/CASE MANAGEMENT/SOCIAL WORK - NSCORESITESY/N_GEN_A_CORE_RD
.  This note has been generated using voice-recognition software. There may be typographical errors that have been missed during proof-reading.     Primary Care Provider Appointment    Subjective:      Patient ID: Nydia Stevens is a 89 y.o. female here for follow up.     Chief Complaint: Follow-up  HPI:  This is an 89-year-old female with hypertension, hyperlipidemia, congestive heart failure, h/o colon cancer, iron deficiency anemia with history of GI bleed 12/2024, carotid artery disease, prediabetes, osteoporosis here for f/u.  patient last seen  12/17/2024    Patient states she is doing well.  No shortness a breath, weakness, falls, fatigue.  She states that she does for herself and will get out of the house any time that she is invited.    Patient Active Problem List   Diagnosis    Primary hypertension    Mixed hyperlipidemia    Palliative care encounter    Closed fracture of left olecranon process    Debility    Aortic stenosis, mild    Osteoporosis    Iron deficiency anemia due to chronic blood loss    Chronic combined systolic and diastolic heart failure    History of colon cancer    Carotid stenosis, asymptomatic, bilateral    H/O: hysterectomy    Prediabetes    Hyponatremia    B12 deficiency    Healthcare maintenance    Aortic atherosclerosis    Benign mole    Adrenal nodule    Cerebral atrophy, mild    History of hematuria    Rectal bleeding    History of GI bleed        Past Surgical History:   Procedure Laterality Date    CATARACT EXTRACTION W/  INTRAOCULAR LENS IMPLANT Left 8/11/14    [facility]    CATARACT EXTRACTION W/  INTRAOCULAR LENS IMPLANT Right 09/15/14    [facility]    COLONOSCOPY N/A 3/19/2020    Procedure: COLONOSCOPY;  Surgeon: Real Mabry MD;  Location: [facility] (2ND FLR);  Service: Endoscopy;  Laterality: N/A;    COLONOSCOPY N/A 3/23/2021    Procedure: COLONOSCOPY;  Surgeon: AUTUMN Begr MD;  Location: [facility] (4TH FLR);  Service: Endoscopy;  Laterality: N/A;  covid test 3/20 st  [place]    COLONOSCOPY N/A 2/15/2022    Procedure: COLONOSCOPY;  Surgeon: Doris Simpson MD;  Location: [facility] ENDO (4TH FLR);  Service: Endoscopy;  Laterality: N/A;  COVID test on 2/12/22 at [facility]  2/14/22 - Pt confirmed 0640 arrival, prep instructions reviewed, Pt verbalized understanding-[id number]    COLONOSCOPY N/A 12/9/2024    Procedure: COLONOSCOPY;  Surgeon: Vitaly Virgen MD;  Location: [facility] ENDO (2ND FLR);  Service: Gastroenterology;  Laterality: N/A;    ESOPHAGOGASTRODUODENOSCOPY N/A 3/19/2020    Procedure: EGD (ESOPHAGOGASTRODUODENOSCOPY);  Surgeon: Real Mabry MD;  Location: [facility] ENDO (2ND FLR);  Service: Endoscopy;  Laterality: N/A;    ESOPHAGOGASTRODUODENOSCOPY N/A 12/9/2024    Procedure: EGD (ESOPHAGOGASTRODUODENOSCOPY);  Surgeon: Vitaly Virgen MD;  Location: [facility] ENDO (2ND FLR);  Service: Gastroenterology;  Laterality: N/A;    FOOT SURGERY      left    HYSTERECTOMY  1962    ILEOCOLECTOMY N/A 4/15/2021    Procedure: ILEOCOLECTOMY;  Surgeon: AUTUMN Berg MD;  Location: [facility] OR 2ND FLR;  Service: Colon and Rectal;  Laterality: N/A;    INSERTION OF TUNNELED CENTRAL VENOUS CATHETER (CVC) WITH SUBCUTANEOUS PORT N/A 7/2/2021    Procedure: [id number];  Surgeon: Suresh Agrawal MD;  Location: [facility] OR 2ND FLR;  Service: Vascular;  Laterality: N/A;  Right IJ    LAPAROSCOPIC HEMICOLECTOMY Right 3/20/2020    Procedure: HEMICOLECTOMY, RIGHT;  Surgeon: AUTUMN Berg MD;  Location: [facility] OR 2ND FLR;  Service: Colon and Rectal;  Laterality: Right;    LYSIS OF ADHESIONS N/A 4/15/2021    Procedure: LYSIS, ADHESIONS;  Surgeon: AUTUMN Berg MD;  Location: [facility] OR 2ND FLR;  Service: Colon and Rectal;  Laterality: N/A;  Greater than 2 hours    MOBILIZATION OF SPLENIC FLEXURE N/A 4/15/2021    Procedure: MOBILIZATION, SPLENIC FLEXURE;  Surgeon: AUTUMN Berg MD;  Location: [facility] OR 2ND FLR;  Service: Colon and Rectal;  Laterality: N/A;       Past Medical History:   Diagnosis Date     Allergy     Cachexia 3/2/2020    Cancer     colon    Cataract     Dementia with behavioral disturbance     Encounter for blood transfusion     Hemolytic anemia 3/18/2020    Hyperlipidemia     Hypertension     Osteoporosis        Social History     Socioeconomic History    Marital status: Single   Occupational History    Occupation: teaching retired    Occupation: Nun.  [employer]   Tobacco Use    Smoking status: Never    Smokeless tobacco: Never   Substance and Sexual Activity    Alcohol use: No     Alcohol/week: 0.0 standard drinks of alcohol    Drug use: No    Sexual activity: Never   Social History Narrative    Member of [employer] order here in [place], [place]..  Lives with about 40-50 sisters.  All sisters with her are retired.       Social Drivers of Health     Financial Resource Strain: Low Risk  (12/20/2024)    Overall Financial Resource Strain (CARDIA)     Difficulty of Paying Living Expenses: Not hard at all   Food Insecurity: No Food Insecurity (12/20/2024)    Hunger Vital Sign     Worried About Running Out of Food in the Last Year: Never true     Ran Out of Food in the Last Year: Never true   Transportation Needs: No Transportation Needs (12/20/2024)    PRAPARE - Transportation     Lack of Transportation (Medical): No     Lack of Transportation (Non-Medical): No   Physical Activity: Insufficiently Active (12/20/2024)    Exercise Vital Sign     Days of Exercise per Week: 2 days     Minutes of Exercise per Session: 60 min   Stress: No Stress Concern Present (12/20/2024)    [employer] - Occupational Stress Questionnaire     Feeling of Stress : Not at all   Housing Stability: Low Risk  (12/20/2024)    Housing Stability Vital Sign     Unable to Pay for Housing in the Last Year: No     Homeless in the Last Year: No       Review of Systems         No data to display                 Checklist of Daily Activities:        Objective:   /78 (BP Location:  "Left arm, Patient Position: Sitting)   Pulse 88   Temp 97.7 °F (36.5 °C) (Oral)   Ht 5' 7" (1.702 m)   Wt 55.5 kg (122 lb 5.7 oz)   SpO2 99%   BMI 19.16 kg/m²     Physical Exam  Constitutional:       General: She is not in acute distress.     Appearance: She is not ill-appearing, toxic-appearing or diaphoretic.   Cardiovascular:      Rate and Rhythm: Normal rate and regular rhythm.      Heart sounds: Murmur heard.   Neurological:      Mental Status: She is alert.             Lab Results   Component Value Date    WBC 8.38 12/24/2024    HGB 8.9 (L) 12/24/2024    HCT 30.0 (L) 12/24/2024     12/24/2024    CHOL 270 (H) 11/07/2024    TRIG 84 11/07/2024    HDL 76 (H) 11/07/2024    ALT 10 12/17/2024    AST 28 12/17/2024     (L) 12/24/2024    K 4.0 12/24/2024    CL 99 12/24/2024    CREATININE 0.8 12/24/2024    BUN 11 12/24/2024    CO2 24 12/24/2024    TSH 2.396 11/07/2024    INR 1.0 12/07/2024    HGBA1C 5.7 (H) 11/07/2024       Current Outpatient Medications on File Prior to Visit   Medication Sig Dispense Refill    amLODIPine (NORVASC) 10 MG tablet Take 1 tablet (10 mg total) by mouth once daily. 90 tablet 3    calcium carbonate (OS-JAILYN) 500 mg calcium (1,250 mg) tablet Take 1 tablet (500 mg total) by mouth once daily.  0    cholecalciferol, vitamin D3, (VITAMIN D3) 25 mcg (1,000 unit) capsule Take 1,000 Units by mouth once daily. Hold until after surgery      cyanocobalamin (VITAMIN B-12) 1000 MCG tablet Take 1 tablet (1,000 mcg total) by mouth once daily. Hold until after surgery 30 tablet 11    FEROSUL 325 mg (65 mg iron) Tab tablet TAKE ONE TABLET BY MOUTH ON TUESDAY, THURSDAY, AND SATURDAY 12 tablet 5    losartan (COZAAR) 100 MG tablet Take 1 tablet (100 mg total) by mouth once daily. 30 tablet 5    rosuvastatin (CRESTOR) 40 MG Tab TAKE ONE TABLET BY MOUTH EVERY DAY FOR CHOLESTEROL 90 tablet 3    VIT C/VIT E ACETATE/LUTEIN/MIN (OCUVITE LUTEIN ORAL) Take 1 tablet by mouth once daily. Hold until after " "surgery      [DISCONTINUED] potassium chloride (KLOR-CON) 10 MEQ TbSR TAKE 1 TABLET BY MOUTH ONCE DAILY. 90 tablet 3     Current Facility-Administered Medications on File Prior to Visit   Medication Dose Route Frequency Provider Last Rate Last Admin    gabapentin capsule 300 mg  300 mg Oral TID Amanda Gaspar NP   300 mg at 04/16/21 0814         Assessment:   89 y.o. female with multiple co-morbid illnesses here for continued follow up of medical problems.      Plan:     Problem List Items Addressed This Visit          Neuro    Cerebral atrophy, mild     Noted on CT head 12/21/2023:"Mild generalized cerebral volume loss with compensatory prominence of the ventricles and sulci "  Improved memory since completing chemo a few years ago.  Stable symptoms.  Will follow for any worsening.              Cardiac/Vascular    Mixed hyperlipidemia (Chronic)       On high-dose statin.    Tolerating well, will follow.  Labs due 11/2025         Chronic combined systolic and diastolic heart failure (Chronic)       Lasix discontinued.  On review, it appears patient is no longer on potassium.  Euvolemic today.  1 lb weight gain since last visit.    Continue current medications.  Will monitor with need for Lasix in the past.         Primary hypertension     BP at goal.    Continue current BP meds         Relevant Orders    BASIC METABOLIC PANEL    Aortic atherosclerosis     Noted on CT chest abdomen pelvis 02/26/2021:  Advanced atherosclerosis along the abdominal aorta and iliac arteries   Patient asymptomatic.  Patient on high-dose statin.  Tolerating well , no side effects.  Will check LDL with yearly labs.            Oncology    Iron deficiency anemia due to chronic blood loss - Primary      On daily iron.  Recent GI bleed.  Hospitalized 12/2024.  C scope without cause noted, unable to reach anastomosis of colon cancer resection.    Avoid  NSAIDs and aspirin.    Continue iron.  Labs today for evaluation.         Relevant " No Orders    CBC Auto Differential       Endocrine    Hyponatremia       Hypokalemia resolved.  Hyponatremia almost within normal limits.  Patient states she is no longer on potassium.    Labs today for evaluation.         Adrenal nodule     Noted on CT chest abdomen pelvis 03/15/2023: Note of 1.5 cm right adrenal hypodense nodule stable   Noted to be stable.  No indication for workup at this time  With age another  medical issues.            Other    Healthcare maintenance     Power-of- completed 5/2022 with patient's assistance [facility].  Reviewed lapost and poa 5/2024  yearly labs 11/2024              Health Maintenance         Date Due Completion Date    COVID-19 Vaccine (9 - 2024-25 season) 11/12/2024 9/17/2024    DEXA Scan 07/26/2025 7/26/2023    Override on 2/15/2016: Declined    Hemoglobin A1c (Prediabetes) 11/07/2025 11/7/2024    Colonoscopy 12/09/2027 12/9/2024    TETANUS VACCINE 02/15/2028 2/15/2018          Medications Reconciliation:   I have not reconciled the patient's home medications with the patient/family. I have updated all changes.  Refer to After-Visit Medication List.      Medication List            Accurate as of January 16, 2025 12:01 PM. If you have any questions, ask your nurse or doctor.                CONTINUE taking these medications      amLODIPine 10 MG tablet  Commonly known as: NORVASC  Take 1 tablet (10 mg total) by mouth once daily.     calcium carbonate 500 mg calcium (1,250 mg) tablet  Commonly known as: OS-JAILYN  Take 1 tablet (500 mg total) by mouth once daily.     cholecalciferol (vitamin D3) 25 mcg (1,000 unit) capsule  Commonly known as: VITAMIN D3     cyanocobalamin 1000 MCG tablet  Commonly known as: VITAMIN B-12  Take 1 tablet (1,000 mcg total) by mouth once daily. Hold until after surgery     FeroSuL 325 mg (65 mg iron) Tab tablet  Generic drug: ferrous sulfate  TAKE ONE TABLET BY MOUTH ON TUESDAY, THURSDAY, AND SATURDAY     losartan 100 MG tablet  Commonly  known as: COZAAR  Take 1 tablet (100 mg total) by mouth once daily.     OCUVITE LUTEIN ORAL     rosuvastatin 40 MG Tab  Commonly known as: CRESTOR  TAKE ONE TABLET BY MOUTH EVERY DAY FOR CHOLESTEROL            STOP taking these medications      potassium chloride 10 MEQ Tbsr  Commonly known as: KLOR-CON  Stopped by: Brittney Travis MD                   Follow up in about 2 months (around 3/16/2025). Total clinical care time was  24 min. The following issues were discussed:  recent GI bleed with hospitalization, iron deficiency anemia and oral iron, labs reviewed, need for further labs     Brittney Travis MD  Internal Medicine  [facility]  [phone]